# Patient Record
Sex: FEMALE | Race: WHITE | NOT HISPANIC OR LATINO | Employment: UNEMPLOYED | URBAN - METROPOLITAN AREA
[De-identification: names, ages, dates, MRNs, and addresses within clinical notes are randomized per-mention and may not be internally consistent; named-entity substitution may affect disease eponyms.]

---

## 2017-05-18 RX ORDER — INSULIN GLARGINE 100 [IU]/ML
INJECTION, SOLUTION SUBCUTANEOUS
Qty: 45 SYRINGE | Refills: 3 | Status: SHIPPED | OUTPATIENT
Start: 2017-05-18 | End: 2018-08-10 | Stop reason: SDUPTHER

## 2017-06-22 RX ORDER — ESCITALOPRAM OXALATE 20 MG/1
TABLET ORAL
Qty: 90 TABLET | Refills: 3 | Status: SHIPPED | OUTPATIENT
Start: 2017-06-22 | End: 2018-06-28 | Stop reason: SDUPTHER

## 2017-06-22 RX ORDER — BISOPROLOL FUMARATE 10 MG/1
TABLET, FILM COATED ORAL
Qty: 90 TABLET | Refills: 3 | Status: ON HOLD | OUTPATIENT
Start: 2017-06-22 | End: 2017-08-11 | Stop reason: HOSPADM

## 2017-07-07 DIAGNOSIS — Z86.010 HISTORY OF COLON POLYPS: ICD-10-CM

## 2017-07-07 DIAGNOSIS — Z80.0 FAMILY HISTORY OF COLON CANCER IN MOTHER: ICD-10-CM

## 2017-07-07 DIAGNOSIS — Z12.11 COLON CANCER SCREENING: Primary | ICD-10-CM

## 2017-07-07 RX ORDER — POLYETHYLENE GLYCOL 3350, SODIUM SULFATE ANHYDROUS, SODIUM BICARBONATE, SODIUM CHLORIDE, POTASSIUM CHLORIDE 236; 22.74; 6.74; 5.86; 2.97 G/4L; G/4L; G/4L; G/4L; G/4L
4 POWDER, FOR SOLUTION ORAL SEE ADMIN INSTRUCTIONS
Qty: 4000 ML | Refills: 0 | Status: SHIPPED | OUTPATIENT
Start: 2017-07-07 | End: 2018-05-17

## 2017-07-07 NOTE — TELEPHONE ENCOUNTER
----- Message from Bee Christopher sent at 7/7/2017  1:38 PM CDT -----  Contact: [phone]/ self   Patient would like to schedule a colonoscopy. Please advise.

## 2017-07-07 NOTE — TELEPHONE ENCOUNTER
Colonoscopy Referral  Referring Physician: Dr. Ramirez  Date: 2/16/17  Reason for Referral: Screening  Family History of:   Colon polyp: No  Relationship/Age of Onset:   Colon cancer: Yes  Relationship/Age of Onset: Mother, 74   Patient with:   Hemoccults Done: No  Iron deficient: No  On Blood Thinner: No  Valvular heart disease/valve replacement: No  Anemia Present: No  On NSAID: No  Lung disease: No  Kidney disease: No  Hx of polyps: Yes  Hx of colon cancer: No  Previous colon evalations: Yes   Colonoscopy  When: 12/21/11  Where: [facility]   Pertinent symptoms:   Current Outpatient Prescriptions:  loperamide (IMODIUM) 1 mg/5 mL solution, Take by mouth every 6 (six) hours as needed for Diarrhea., Disp: , Rfl:   No current facility-administered medications for this visit.   ?  Review of patient's allergies indicates:  No Known Allergies  Patient was scheduled for colonoscopy on 8/1/17 with Dr. Ramirez at [facility]. Golytely instructions were reviewed with patient.

## 2017-07-17 ENCOUNTER — HOSPITAL ENCOUNTER (OUTPATIENT)
Dept: RADIOLOGY | Facility: HOSPITAL | Age: 62
Discharge: HOME | End: 2017-07-17
Attending: INTERNAL MEDICINE
Payer: OTHER GOVERNMENT

## 2017-07-17 ENCOUNTER — OFFICE VISIT (OUTPATIENT)
Dept: HEMATOLOGY/ONCOLOGY | Facility: CLINIC | Age: 62
End: 2017-07-17
Payer: OTHER GOVERNMENT

## 2017-07-17 VITALS
HEART RATE: 72 BPM | SYSTOLIC BLOOD PRESSURE: 139 MMHG | DIASTOLIC BLOOD PRESSURE: 63 MMHG | TEMPERATURE: 100 F | BODY MASS INDEX: 34.99 KG/M2 | WEIGHT: 185.19 LBS | RESPIRATION RATE: 16 BRPM

## 2017-07-17 VITALS — WEIGHT: 180 LBS | BODY MASS INDEX: 33.99 KG/M2 | HEIGHT: 61 IN

## 2017-07-17 DIAGNOSIS — D05.12 DUCTAL CARCINOMA IN SITU (DCIS) OF LEFT BREAST: Primary | ICD-10-CM

## 2017-07-17 DIAGNOSIS — D05.12 DUCTAL CARCINOMA IN SITU (DCIS) OF LEFT BREAST: ICD-10-CM

## 2017-07-17 PROCEDURE — 99999 PR PBB SHADOW E&M-EST. PATIENT-LVL III: CPT | Mod: PBBFAC,,, | Performed by: INTERNAL MEDICINE

## 2017-07-17 PROCEDURE — 77062 BREAST TOMOSYNTHESIS BI: CPT | Mod: 26,,, | Performed by: RADIOLOGY

## 2017-07-17 PROCEDURE — 77066 DX MAMMO INCL CAD BI: CPT | Mod: 26,,, | Performed by: RADIOLOGY

## 2017-07-17 PROCEDURE — 99213 OFFICE O/P EST LOW 20 MIN: CPT | Mod: S$GLB,,, | Performed by: INTERNAL MEDICINE

## 2017-07-17 NOTE — PROGRESS NOTES
Subjective:       Patient ID: Jeanine Chandler is a 62 y.o. female.    Chief Complaint: Follow-up    HPI Ms. Chandler is a 62-year-old white female who   returned to clinic for followup of ductal carcinoma in situ of the left      breast.  She had lumpectomy in 05/2005 and was treated on protocol NSABP     B-35 (Arimidex versus tamoxifen).  She completed that therapy in 09/2010.    Today she reports no new issues.  She has had some emotional stress related to the death of her mother at the beginning of the year.  She's building a house in [place] is looking forward to that.  She's also expecting to have a new grandchild in August.    Review of Systems   Constitutional: Negative for activity change, appetite change and unexpected weight change.   Respiratory: Negative for cough and shortness of breath.    Cardiovascular: Negative for chest pain.   Gastrointestinal: Negative for abdominal pain, nausea and vomiting.   Musculoskeletal: Negative for back pain and neck pain.   Neurological: Negative for headaches.   Psychiatric/Behavioral: Negative for dysphoric mood. The patient is not nervous/anxious.        Objective:      Physical Exam   Constitutional: She appears well-developed and well-nourished.   Cardiovascular: Normal rate, regular rhythm and normal heart sounds.    Pulmonary/Chest: Effort normal and breath sounds normal. She has no wheezes. She has no rales. Right breast exhibits no mass, no nipple discharge and no skin change. Left breast exhibits no mass, no nipple discharge and no skin change.       Abdominal: Soft. She exhibits no mass. There is no tenderness.   Psychiatric: She has a normal mood and affect. Her behavior is normal. Thought content normal.       Assessment:     mammograms  are unremarkable  1. Ductal carcinoma in situ (DCIS) of left breast        Plan:       Return in one year for follow-up mammograms.

## 2017-07-18 ENCOUNTER — PATIENT MESSAGE (OUTPATIENT)
Dept: GASTROENTEROLOGY | Facility: CLINIC | Age: 62
End: 2017-07-18

## 2017-07-18 ENCOUNTER — TELEPHONE (OUTPATIENT)
Dept: GASTROENTEROLOGY | Facility: CLINIC | Age: 62
End: 2017-07-18

## 2017-07-19 ENCOUNTER — TELEPHONE (OUTPATIENT)
Dept: GASTROENTEROLOGY | Facility: CLINIC | Age: 62
End: 2017-07-19

## 2017-07-19 NOTE — TELEPHONE ENCOUNTER
Spoke with pt and was able to reschedule her procedure to 8/11/17 with Dr. Ramirez. Verbal Understanding. Pt stated that she already has her prep and instructions.           ----- Message from Amy Benavides sent at 7/19/2017  1:48 PM CDT -----  No. [phone]   Patient is calling to reschedule her procedure.   Please call.

## 2017-08-11 ENCOUNTER — HOSPITAL ENCOUNTER (OUTPATIENT)
Facility: HOSPITAL | Age: 62
Discharge: HOME | End: 2017-08-11
Attending: INTERNAL MEDICINE | Admitting: INTERNAL MEDICINE
Payer: OTHER GOVERNMENT

## 2017-08-11 ENCOUNTER — ANESTHESIA (OUTPATIENT)
Dept: ENDOSCOPY | Facility: HOSPITAL | Age: 62
End: 2017-08-11
Payer: OTHER GOVERNMENT

## 2017-08-11 ENCOUNTER — ANESTHESIA EVENT (OUTPATIENT)
Dept: ENDOSCOPY | Facility: HOSPITAL | Age: 62
End: 2017-08-11
Payer: OTHER GOVERNMENT

## 2017-08-11 VITALS
HEART RATE: 58 BPM | TEMPERATURE: 98 F | DIASTOLIC BLOOD PRESSURE: 71 MMHG | WEIGHT: 185 LBS | HEIGHT: 61 IN | RESPIRATION RATE: 15 BRPM | OXYGEN SATURATION: 100 % | BODY MASS INDEX: 34.93 KG/M2 | SYSTOLIC BLOOD PRESSURE: 121 MMHG

## 2017-08-11 DIAGNOSIS — I48.91 A-FIB: ICD-10-CM

## 2017-08-11 DIAGNOSIS — Z86.010 HISTORY OF COLON POLYPS: ICD-10-CM

## 2017-08-11 DIAGNOSIS — I49.9 IRREGULAR HEART RATE: ICD-10-CM

## 2017-08-11 PROBLEM — Z86.0100 HISTORY OF COLON POLYPS: Status: ACTIVE | Noted: 2017-08-11

## 2017-08-11 LAB
ANION GAP SERPL CALC-SCNC: 11 MMOL/L
BASOPHILS # BLD AUTO: 0.03 K/UL
BASOPHILS NFR BLD: 0.3 %
BNP SERPL-MCNC: 134 PG/ML
BUN SERPL-MCNC: 10 MG/DL
CALCIUM SERPL-MCNC: 9.2 MG/DL
CHLORIDE SERPL-SCNC: 105 MMOL/L
CK SERPL-CCNC: 35 U/L
CO2 SERPL-SCNC: 26 MMOL/L
CREAT SERPL-MCNC: 1 MG/DL
DIFFERENTIAL METHOD: ABNORMAL
EOSINOPHIL # BLD AUTO: 0.2 K/UL
EOSINOPHIL NFR BLD: 1.5 %
ERYTHROCYTE [DISTWIDTH] IN BLOOD BY AUTOMATED COUNT: 14.5 %
EST. GFR  (AFRICAN AMERICAN): >60 ML/MIN/1.73 M^2
EST. GFR  (NON AFRICAN AMERICAN): 60 ML/MIN/1.73 M^2
GLUCOSE SERPL-MCNC: 164 MG/DL
HCT VFR BLD AUTO: 39.6 %
HGB BLD-MCNC: 13.1 G/DL
LYMPHOCYTES # BLD AUTO: 3.6 K/UL
LYMPHOCYTES NFR BLD: 37.3 %
MCH RBC QN AUTO: 26.4 PG
MCHC RBC AUTO-ENTMCNC: 33.1 G/DL
MCV RBC AUTO: 80 FL
MONOCYTES # BLD AUTO: 0.6 K/UL
MONOCYTES NFR BLD: 6.2 %
NEUTROPHILS # BLD AUTO: 5.3 K/UL
NEUTROPHILS NFR BLD: 54.3 %
PLATELET # BLD AUTO: 246 K/UL
PMV BLD AUTO: 10.6 FL
POCT GLUCOSE: 263 MG/DL (ref 70–110)
POTASSIUM SERPL-SCNC: 4.1 MMOL/L
RBC # BLD AUTO: 4.97 M/UL
SODIUM SERPL-SCNC: 142 MMOL/L
TROPONIN I SERPL DL<=0.01 NG/ML-MCNC: 0.04 NG/ML
TSH SERPL DL<=0.005 MIU/L-ACNC: 2.24 UIU/ML
WBC # BLD AUTO: 9.74 K/UL

## 2017-08-11 PROCEDURE — 25000003 PHARM REV CODE 250: Performed by: NURSE ANESTHETIST, CERTIFIED REGISTERED

## 2017-08-11 PROCEDURE — 84443 ASSAY THYROID STIM HORMONE: CPT

## 2017-08-11 PROCEDURE — 93005 ELECTROCARDIOGRAM TRACING: CPT

## 2017-08-11 PROCEDURE — 88305 TISSUE EXAM BY PATHOLOGIST: CPT | Mod: 26,,, | Performed by: PATHOLOGY

## 2017-08-11 PROCEDURE — 80048 BASIC METABOLIC PNL TOTAL CA: CPT

## 2017-08-11 PROCEDURE — 27201028 HC NEEDLE, SCLERO: Performed by: INTERNAL MEDICINE

## 2017-08-11 PROCEDURE — 45385 COLONOSCOPY W/LESION REMOVAL: CPT | Mod: 33,,, | Performed by: INTERNAL MEDICINE

## 2017-08-11 PROCEDURE — 27201089 HC SNARE, DISP (ANY): Performed by: INTERNAL MEDICINE

## 2017-08-11 PROCEDURE — 25000003 PHARM REV CODE 250: Performed by: INTERNAL MEDICINE

## 2017-08-11 PROCEDURE — 88305 TISSUE EXAM BY PATHOLOGIST: CPT | Performed by: PATHOLOGY

## 2017-08-11 PROCEDURE — 63600175 PHARM REV CODE 636 W HCPCS: Performed by: NURSE ANESTHETIST, CERTIFIED REGISTERED

## 2017-08-11 PROCEDURE — 99245 OFF/OP CONSLTJ NEW/EST HI 55: CPT | Mod: ,,, | Performed by: INTERNAL MEDICINE

## 2017-08-11 PROCEDURE — 83880 ASSAY OF NATRIURETIC PEPTIDE: CPT

## 2017-08-11 PROCEDURE — 36415 COLL VENOUS BLD VENIPUNCTURE: CPT

## 2017-08-11 PROCEDURE — 85025 COMPLETE CBC W/AUTO DIFF WBC: CPT

## 2017-08-11 PROCEDURE — 37000008 HC ANESTHESIA 1ST 15 MINUTES: Performed by: INTERNAL MEDICINE

## 2017-08-11 PROCEDURE — 45381 COLONOSCOPY SUBMUCOUS NJX: CPT | Mod: 51,,, | Performed by: INTERNAL MEDICINE

## 2017-08-11 PROCEDURE — 82550 ASSAY OF CK (CPK): CPT

## 2017-08-11 PROCEDURE — 45385 COLONOSCOPY W/LESION REMOVAL: CPT | Performed by: INTERNAL MEDICINE

## 2017-08-11 PROCEDURE — 45381 COLONOSCOPY SUBMUCOUS NJX: CPT | Performed by: INTERNAL MEDICINE

## 2017-08-11 PROCEDURE — 37000009 HC ANESTHESIA EA ADD 15 MINS: Performed by: INTERNAL MEDICINE

## 2017-08-11 PROCEDURE — 84484 ASSAY OF TROPONIN QUANT: CPT

## 2017-08-11 RX ORDER — METOPROLOL TARTRATE 1 MG/ML
INJECTION, SOLUTION INTRAVENOUS
Status: DISCONTINUED | OUTPATIENT
Start: 2017-08-11 | End: 2017-08-11

## 2017-08-11 RX ORDER — METOPROLOL SUCCINATE 50 MG/1
50 TABLET, EXTENDED RELEASE ORAL DAILY
Qty: 30 TABLET | Refills: 11 | Status: SHIPPED | OUTPATIENT
Start: 2017-08-11 | End: 2017-08-25 | Stop reason: SDUPTHER

## 2017-08-11 RX ORDER — PROPOFOL 10 MG/ML
VIAL (ML) INTRAVENOUS CONTINUOUS PRN
Status: DISCONTINUED | OUTPATIENT
Start: 2017-08-11 | End: 2017-08-11

## 2017-08-11 RX ORDER — SODIUM CHLORIDE 9 MG/ML
INJECTION, SOLUTION INTRAVENOUS CONTINUOUS
Status: DISCONTINUED | OUTPATIENT
Start: 2017-08-11 | End: 2017-08-11 | Stop reason: HOSPADM

## 2017-08-11 RX ORDER — LIDOCAINE HCL/PF 100 MG/5ML
SYRINGE (ML) INTRAVENOUS
Status: DISCONTINUED | OUTPATIENT
Start: 2017-08-11 | End: 2017-08-11

## 2017-08-11 RX ORDER — PROPOFOL 10 MG/ML
VIAL (ML) INTRAVENOUS
Status: DISCONTINUED | OUTPATIENT
Start: 2017-08-11 | End: 2017-08-11

## 2017-08-11 RX ADMIN — LIDOCAINE HYDROCHLORIDE 50 MG: 20 INJECTION, SOLUTION INTRAVENOUS at 12:08

## 2017-08-11 RX ADMIN — PROPOFOL 50 MG: 10 INJECTION, EMULSION INTRAVENOUS at 12:08

## 2017-08-11 RX ADMIN — PROPOFOL 150 MCG/KG/MIN: 10 INJECTION, EMULSION INTRAVENOUS at 12:08

## 2017-08-11 RX ADMIN — METOPROLOL TARTRATE 2 MG: 1 INJECTION, SOLUTION INTRAVENOUS at 11:08

## 2017-08-11 RX ADMIN — SODIUM CHLORIDE: 0.9 INJECTION, SOLUTION INTRAVENOUS at 09:08

## 2017-08-11 NOTE — CONSULTS
[facility]  Cardiology  Consult Note    Patient Name: Jeanine Chandler  MRN: [id number]  Admission Date: 2017  Hospital Length of Stay: 0 days  Code Status: No Order   Attending Provider: Laura Ramirez MD   Consulting Provider: EMMANUEL Lazo, ANP  Primary Care Physician: Chi Hanna MD  Principal Problem: elective colonoscopy; cards consult for afib  Patient information was obtained from patient and past medical records.     Inpatient consult to [facility]  Consult performed by: ELPIDIO CHEN  Consult ordered by: MIKIE PRICE  Reason for consult: atrial fibrillation         Subjective:     Chief Complaint:  Elective colonoscopy for evaluation of colon polyps    Cardiology Consult: new onset afib      HPI:   63yo female with history of HTN, HLP, DMII, DJD and breast cancer who presents for elective colonoscopy. During anesthesia evaluation, her HR was noted to be elevated in the 110s. EKG performed with evidence of afib. Patient without any complaints of chest pain, SOB or palpitations. Cardiology consulted for further evaluation. Ms. Chandler denies any history of afib and denies any exertional chest pain or SOB. She denies any hematuria, hematemesis or melena. She has no cardiac issues and is followed by PCP and was seen by Cardiology in [place] at one time for ?reasons     Past Medical History:   Diagnosis Date    Asthma     DCIS (ductal carcinoma in situ)     left breast; s/p lumpectomy and treated on protocol NSABP       Diabetes mellitus     DJD (degenerative joint disease) of cervical spine     Dysphonia     GERD (gastroesophageal reflux disease)     Hyperlipidemia     Hypertension     Vitamin D deficiency disease        Past Surgical History:   Procedure Laterality Date     elbow      right    BREAST SURGERY       SECTION  1979    HERNIA REPAIR      left inguinal    HYSTERECTOMY      JOINT REPLACEMENT      left knee     "laparscopic      exploratory    myomectomy  1982       Review of patient's allergies indicates:   Allergen Reactions    Cephalosporins      Other reaction(s): Unknown    Latex      Other reaction(s): Rash       No current facility-administered medications on file prior to encounter.      Current Outpatient Prescriptions on File Prior to Encounter   Medication Sig    bisoprolol (ZEBETA) 10 MG tablet TAKE 1 TABLET EVERY DAY    ergocalciferol (ERGOCALCIFEROL) 50,000 unit capsule Take by mouth. 1 Capsule Oral Weekly    escitalopram oxalate (LEXAPRO) 20 MG tablet TAKE 1 TABLET EVERY DAY    FLOVENT  mcg/actuation inhaler INHALE 1 PUFF EVERY 12 HOURS    JANUVIA 100 mg Tab TAKE 1 TABLET BY MOUTH EVERY DAY    lansoprazole (PREVACID) 30 MG capsule TAKE 1 CAPSULE (30 MG TOTAL) BY MOUTH 2 (TWO) TIMES DAILY.    LANTUS SOLOSTAR 100 unit/mL (3 mL) InPn pen INJECT 40 UNITS INTO THE SKIN ONCE DAILY.    lisinopril-hydrochlorothiazide (PRINZIDE,ZESTORETIC) 20-12.5 mg per tablet TAKE 1 TABLET EVERY DAY FOR HYPERTENSION    metformin (GLUCOPHAGE) 1000 MG tablet TAKE 1 TABLET TWICE A DAY WITH MEALS    ranitidine (ZANTAC) 300 MG tablet TAKE 1 TABLET AT BEDTIME    albuterol (PROAIR HFA) 90 mcg/actuation HFAA Inhale 2 puffs into the lungs every 4 (four) hours as needed. 2 HFA Aerosol Inhaler Inhalation Every 4-6 hours    BD INSULIN PEN NEEDLE UF SHORT 31 gauge x 5/16" Ndle USE WITH INSULIN EVERY EVENING AS DIRECTED    blood sugar diagnostic (CONTOUR TEST STRIPS) Strp Test 4 x daily    blood-glucose meter (CONTOUR METER) Misc Test 4x daily    epinephrine (EPIPEN) 0.3 mg/0.3 mL (1:1,000) AtIn Inject 0.3 mLs (0.3 mg total) into the muscle once. Pen Injector Intramuscular .  As directed for anaphylaxis PRN    fenofibrate (TRICOR) 145 MG tablet TAKE 1 TABLET (145 MG TOTAL) BY MOUTH ONCE DAILY.    FLUARIX QUAD 8697-1071, PF, 60 mcg (15 mcg x 4)/0.5 mL Syrg     lancets (ONE TOUCH ULTRASOFT LANCETS) Misc 1 Units by " Misc.(Non-Drug; Combo Route) route 3 (three) times daily.    nystatin (MYCOSTATIN) cream Apply topically 2 (two) times daily.    polyethylene glycol (GOLYTELY,NULYTELY) 236-22.74-6.74 -5.86 gram suspension Take 4,000 mLs (4 L total) by mouth As instructed.    simvastatin (ZOCOR) 10 MG tablet Take 1 tablet (10 mg total) by mouth once daily.    sitagliptin (JANUVIA) 100 MG Tab Take 1 tablet (100 mg total) by mouth once daily.     Family History     Problem Relation (Age of Onset)    Cancer Mother    Diabetes Mother    Heart disease Mother, Maternal Uncle    Stroke Sister        Social History Main Topics    Smoking status: Never Smoker    Smokeless tobacco: Never Used    Alcohol use No    Drug use: No    Sexual activity: Not on file     Review of Systems   Constitution: Negative for chills, decreased appetite, diaphoresis, fever and weakness.   Cardiovascular: Negative for chest pain, claudication, cyanosis, dyspnea on exertion, irregular heartbeat, leg swelling, near-syncope, orthopnea, palpitations, paroxysmal nocturnal dyspnea and syncope.   Respiratory: Negative for cough, hemoptysis, shortness of breath and wheezing.    Gastrointestinal: Negative for bloating, abdominal pain, constipation, diarrhea, melena, nausea and vomiting.   Neurological: Negative for dizziness.     Objective:     Vital Signs (Most Recent):  Temp: 98.9 °F (37.2 °C) (08/11/17 1157)  Pulse: 88 (08/11/17 1157)  Resp: 18 (08/11/17 1157)  BP: 138/70 (08/11/17 1157)  SpO2: 99 % (08/11/17 1157) Vital Signs (24h Range):  Temp:  [98.9 °F (37.2 °C)] 98.9 °F (37.2 °C)  Pulse:  [88] 88  Resp:  [18] 18  SpO2:  [99 %] 99 %  BP: (138)/(70) 138/70     Weight: 83.9 kg (185 lb)  Body mass index is 34.96 kg/m².    SpO2: 99 %  O2 Device (Oxygen Therapy): room air    No intake or output data in the 24 hours ending 08/11/17 1222    Lines/Drains/Airways     Airway                 Airway - Non-Surgical 08/11/17 1154 Nasal Cannula less than 1 day           Peripheral Intravenous Line                 Peripheral IV - Single Lumen 08/11/17 0942 Right Antecubital less than 1 day                Physical Exam   Constitutional: She is oriented to person, place, and time. She appears well-developed and well-nourished. No distress.   Cardiovascular: Normal rate.  An irregularly irregular rhythm present. Exam reveals no gallop.    No murmur heard.  Pulmonary/Chest: Effort normal and breath sounds normal. No respiratory distress. She has no wheezes.   Abdominal: Soft. Bowel sounds are normal. She exhibits no distension. There is no tenderness.   Neurological: She is alert and oriented to person, place, and time.   Skin: Skin is warm and dry.       Significant Labs: BMP, CBC, TSH, BNP, CPK and troponin in process    EKG: atrial fibrillation with ; normal axis; no STTWC       Assessment and Plan:     Atrial fibrillation    -new onset afib  -HR 110s asymptomatic; no concern for ischemic etiology currently  -labs pending  -on Bisoprolol at home and recommend change to Toprol XL 50mg po daily  -CHADSVAS score 4 with indication for chronic AC for stroke prevention but will hold off given need for colonoscopy and possible polypectomy  -will rate control for now; may proceed with colonoscopy; if RVR occurs recommend use of IV Metoprolol, Cardizem or Esmolol  -will arrange for follow up as an outpatient to discuss AHSAN/DCCV         History of colon polyps    -history of colon polyps  -routine repeat colonoscopy today             VTE Risk Mitigation     None          Thank you for your consult. I will sign off. Please contact us if you have any additional questions.    EMMANUEL Lazo, ANP  Cardiology   [facility]

## 2017-08-11 NOTE — ANESTHESIA PREPROCEDURE EVALUATION
08/11/2017  Jeanine Chandler is a 62 y.o., female for colonoscopy under MAC. New onset afib rate 110's, seen by cardiology who recommends lopressor and proceed. They have arranged f/u with cardiology clinic.     Anesthesia Evaluation     I have reviewed the Nursing Notes.   I have reviewed the Medications.     Review of Systems  Anesthesia Hx:   Denies Personal Hx of Anesthesia complications.   Social:  Non-Smoker, No Alcohol Use   Cardiovascular:   Exercise tolerance: good Hypertension hyperlipidemia    Pulmonary:   Asthma    Hepatic/GI:   GERD    Musculoskeletal:   Arthritis   Spine Disorders: Disc disease and Degenerative disease    Endocrine:   Diabetes        Physical Exam  General:  Obesity       Chest/Lungs:  Chest/Lungs Clear    Heart/Vascular:  Heart Findings: Rate: Tachycardia  Rhythm: Irregularly Irregular             Anesthesia Plan  Type of Anesthesia, risks & benefits discussed:  Anesthesia Type:  MAC  Patient's Preference:   Intra-op Monitoring Plan:   Intra-op Monitoring Plan Comments:   Post Op Pain Control Plan:   Post Op Pain Control Plan Comments:   Induction:    Beta Blocker:  Patient is not currently on a Beta-Blocker (No further documentation required).       Informed Consent: Patient understands risks and agrees with Anesthesia plan.  Questions answered. Anesthesia consent signed with patient.  ASA Score: 2     Day of Surgery Review of History & Physical:            Ready For Surgery From Anesthesia Perspective.

## 2017-08-11 NOTE — ASSESSMENT & PLAN NOTE
-new onset afib  -HR 110s asymptomatic; no concern for ischemic etiology currently  -labs pending  -on Bisoprolol at home and recommend change to Toprol XL 50mg po daily  -CHADSVAS score 4 with indication for chronic AC for stroke prevention but will hold off given need for colonoscopy and possible polypectomy  -will rate control for now; may proceed with colonoscopy; if RVR occurs recommend use of IV Metoprolol, Cardizem or Esmolol  -will arrange for follow up as an outpatient to discuss AHSAN/DCCV

## 2017-08-11 NOTE — H&P
History and Physical      Chief complaints: Requesting colonscopy    History of Presenting Illness    Patient requesting colonoscopy.  Patient denies any abdominal pain, weight loss or blood in the stool.  There is a family history of colon cancer.  On presentation today, she was in new onset Afib.    Review of Systems   Constitutional: Negative for fever and appetite change.   HENT: Negative for sore throat, trouble swallowing and neck pain.   Eyes: Negative for photophobia and visual disturbance.   Respiratory: Negative for wheezing.   Cardiovascular: Negative for chest pain and palpitations.   Gastrointestinal: See HPI for details   Musculoskeletal: Negative for joint swelling and arthralgias.   Skin: Negative for rash and wound.   Neurological: Negative for dizziness, tremors and weakness.   Hematological: Negative.   Psychiatric/Behavioral: Negative for suicidal ideas and behavioral problems.     Past Medical History:   Diagnosis Date    Asthma     DCIS (ductal carcinoma in situ)     left breast; s/p lumpectomy and treated on protocol NSABP       Diabetes mellitus     DJD (degenerative joint disease) of cervical spine     Dysphonia     GERD (gastroesophageal reflux disease)     Hyperlipidemia     Hypertension     Vitamin D deficiency disease        Past Surgical History:   Procedure Laterality Date     elbow      right    BREAST SURGERY       SECTION      1    HERNIA REPAIR      left inguinal    HYSTERECTOMY      JOINT REPLACEMENT      left knee    laparscopic      exploratory    myomectomy  1982       Family History   Problem Relation Age of Onset    Diabetes Mother     Heart disease Mother     Cancer Mother     Stroke Sister     Heart disease Maternal Uncle        Social History     Social History    Marital status:      Spouse name: N/A    Number of children: N/A    Years of education: N/A     Social History Main Topics    Smoking status: Never Smoker     Smokeless tobacco: Never Used    Alcohol use No    Drug use: No    Sexual activity: Not Asked     Other Topics Concern    None     Social History Narrative    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 Appears well-nourished.   HENT:   Mouth/Throat: Oropharynx is clear and moist.   Eyes: Pupils are equal, round, and reactive to light.   Neck: Neck supple.   Cardiovascular: Normal heart sounds.   Pulmonary/Chest: Effort normal and breath sounds normal.   Abdominal: Soft. Exhibits no mass. There is no tenderness. There is no guarding.   Musculoskeletal: Normal range of motion.   Lymphadenopathy: Has no cervical adenopathy.   Neurological:Alert and oriented to person, place, and time.   Skin: Skin is warm. No rash noted.   Psychiatric: Has a normal mood and affect.     Assessment:  Family history of colon cancer     Plan:  Colonoscopy if cleared by Cardiologist.       I have reviewed the patient's medical history in detail and updated the computerized patient record

## 2017-08-11 NOTE — ANESTHESIA POSTPROCEDURE EVALUATION
"Anesthesia Post Evaluation    Patient: Jeanine Chandler    Procedure(s) Performed: Procedure(s) (LRB):  COLONOSCOPY Golytely (N/A)    Final Anesthesia Type: MAC  Patient location during evaluation: GI PACU  Patient participation: Yes- Able to Participate  Level of consciousness: awake and alert and oriented  Post-procedure vital signs: reviewed and stable  Pain management: adequate  Airway patency: patent  PONV status at discharge: No PONV  Anesthetic complications: no      Cardiovascular status: blood pressure returned to baseline and hemodynamically stable  Respiratory status: unassisted, spontaneous ventilation and room air  Hydration status: euvolemic  Follow-up not needed.        Visit Vitals  /70 (BP Location: Right arm, Patient Position: Lying)   Pulse 88   Temp 37.2 °C (98.9 °F) (Oral)   Resp 18   Ht 5' 1" (1.549 m)   Wt 83.9 kg (185 lb)   SpO2 99%   Breastfeeding? No   BMI 34.96 kg/m²       Pain/Brandyn Score: Pain Assessment Performed: Yes (8/11/2017  9:41 AM)  Presence of Pain: denies (8/11/2017  9:41 AM)      "

## 2017-08-11 NOTE — HPI
61yo female with history of HTN, HLP, DMII, DJD and breast cancer who presents for elective colonoscopy. During anesthesia evaluation, her HR was noted to be elevated in the 110s. EKG performed with evidence of afib. Patient without any complaints of chest pain, SOB or palpitations. Cardiology consulted for further evaluation. Ms. Chandler denies any history of afib and denies any exertional chest pain or SOB. She denies any hematuria, hematemesis or melena. She has no cardiac issues and is followed by PCP and was seen by Cardiology in [place] at one time for ?reasons

## 2017-08-11 NOTE — TRANSFER OF CARE
"Anesthesia Transfer of Care Note    Patient: Jeanine Chandler    Procedure(s) Performed: Procedure(s) (LRB):  COLONOSCOPY Golytely (N/A)    Patient location: GI    Anesthesia Type: MAC    Transport from OR: Transported from OR on room air with adequate spontaneous ventilation    Post pain: adequate analgesia    Post assessment: no apparent anesthetic complications    Post vital signs: stable    Level of consciousness: awake    Nausea/Vomiting: no nausea/vomiting    Complications: none    Transfer of care protocol was followed      Last vitals:   Visit Vitals  /70 (BP Location: Right arm, Patient Position: Lying)   Pulse 88   Temp 37.2 °C (98.9 °F) (Oral)   Resp 18   Ht 5' 1" (1.549 m)   Wt 83.9 kg (185 lb)   SpO2 99%   Breastfeeding? No   BMI 34.96 kg/m²     "

## 2017-08-11 NOTE — SUBJECTIVE & OBJECTIVE
Past Medical History:   Diagnosis Date    Asthma     DCIS (ductal carcinoma in situ)     left breast; s/p lumpectomy and treated on protocol NSABP       Diabetes mellitus     DJD (degenerative joint disease) of cervical spine     Dysphonia     GERD (gastroesophageal reflux disease)     Hyperlipidemia     Hypertension     Vitamin D deficiency disease        Past Surgical History:   Procedure Laterality Date     elbow      right    BREAST SURGERY       SECTION      1    HERNIA REPAIR      left inguinal    HYSTERECTOMY      JOINT REPLACEMENT      left knee    laparscopic      exploratory    myomectomy         Review of patient's allergies indicates:   Allergen Reactions    Cephalosporins      Other reaction(s): Unknown    Latex      Other reaction(s): Rash       No current facility-administered medications on file prior to encounter.      Current Outpatient Prescriptions on File Prior to Encounter   Medication Sig    bisoprolol (ZEBETA) 10 MG tablet TAKE 1 TABLET EVERY DAY    ergocalciferol (ERGOCALCIFEROL) 50,000 unit capsule Take by mouth. 1 Capsule Oral Weekly    escitalopram oxalate (LEXAPRO) 20 MG tablet TAKE 1 TABLET EVERY DAY    FLOVENT  mcg/actuation inhaler INHALE 1 PUFF EVERY 12 HOURS    JANUVIA 100 mg Tab TAKE 1 TABLET BY MOUTH EVERY DAY    lansoprazole (PREVACID) 30 MG capsule TAKE 1 CAPSULE (30 MG TOTAL) BY MOUTH 2 (TWO) TIMES DAILY.    LANTUS SOLOSTAR 100 unit/mL (3 mL) InPn pen INJECT 40 UNITS INTO THE SKIN ONCE DAILY.    lisinopril-hydrochlorothiazide (PRINZIDE,ZESTORETIC) 20-12.5 mg per tablet TAKE 1 TABLET EVERY DAY FOR HYPERTENSION    metformin (GLUCOPHAGE) 1000 MG tablet TAKE 1 TABLET TWICE A DAY WITH MEALS    ranitidine (ZANTAC) 300 MG tablet TAKE 1 TABLET AT BEDTIME    albuterol (PROAIR HFA) 90 mcg/actuation HFAA Inhale 2 puffs into the lungs every 4 (four) hours as needed. 2 HFA Aerosol Inhaler Inhalation Every 4-6 hours    BD INSULIN PEN  "NEEDLE UF SHORT 31 gauge x 5/16" Ndle USE WITH INSULIN EVERY EVENING AS DIRECTED    blood sugar diagnostic (CONTOUR TEST STRIPS) Strp Test 4 x daily    blood-glucose meter (CONTOUR METER) Misc Test 4x daily    epinephrine (EPIPEN) 0.3 mg/0.3 mL (1:1,000) AtIn Inject 0.3 mLs (0.3 mg total) into the muscle once. Pen Injector Intramuscular .  As directed for anaphylaxis PRN    fenofibrate (TRICOR) 145 MG tablet TAKE 1 TABLET (145 MG TOTAL) BY MOUTH ONCE DAILY.    FLUARIX QUAD 4022-0484, PF, 60 mcg (15 mcg x 4)/0.5 mL Syrg     lancets (ONE TOUCH ULTRASOFT LANCETS) Misc 1 Units by Misc.(Non-Drug; Combo Route) route 3 (three) times daily.    nystatin (MYCOSTATIN) cream Apply topically 2 (two) times daily.    polyethylene glycol (GOLYTELY,NULYTELY) 236-22.74-6.74 -5.86 gram suspension Take 4,000 mLs (4 L total) by mouth As instructed.    simvastatin (ZOCOR) 10 MG tablet Take 1 tablet (10 mg total) by mouth once daily.    sitagliptin (JANUVIA) 100 MG Tab Take 1 tablet (100 mg total) by mouth once daily.     Family History     Problem Relation (Age of Onset)    Cancer Mother    Diabetes Mother    Heart disease Mother, Maternal Uncle    Stroke Sister        Social History Main Topics    Smoking status: Never Smoker    Smokeless tobacco: Never Used    Alcohol use No    Drug use: No    Sexual activity: Not on file     Review of Systems   Constitution: Negative for chills, decreased appetite, diaphoresis, fever and weakness.   Cardiovascular: Negative for chest pain, claudication, cyanosis, dyspnea on exertion, irregular heartbeat, leg swelling, near-syncope, orthopnea, palpitations, paroxysmal nocturnal dyspnea and syncope.   Respiratory: Negative for cough, hemoptysis, shortness of breath and wheezing.    Gastrointestinal: Negative for bloating, abdominal pain, constipation, diarrhea, melena, nausea and vomiting.   Neurological: Negative for dizziness.     Objective:     Vital Signs (Most Recent):  Temp: 98.9 °F " (37.2 °C) (08/11/17 1157)  Pulse: 88 (08/11/17 1157)  Resp: 18 (08/11/17 1157)  BP: 138/70 (08/11/17 1157)  SpO2: 99 % (08/11/17 1157) Vital Signs (24h Range):  Temp:  [98.9 °F (37.2 °C)] 98.9 °F (37.2 °C)  Pulse:  [88] 88  Resp:  [18] 18  SpO2:  [99 %] 99 %  BP: (138)/(70) 138/70     Weight: 83.9 kg (185 lb)  Body mass index is 34.96 kg/m².    SpO2: 99 %  O2 Device (Oxygen Therapy): room air    No intake or output data in the 24 hours ending 08/11/17 1222    Lines/Drains/Airways     Airway                 Airway - Non-Surgical 08/11/17 1154 Nasal Cannula less than 1 day          Peripheral Intravenous Line                 Peripheral IV - Single Lumen 08/11/17 0942 Right Antecubital less than 1 day                Physical Exam   Constitutional: She is oriented to person, place, and time. She appears well-developed and well-nourished. No distress.   Cardiovascular: Normal rate.  An irregularly irregular rhythm present. Exam reveals no gallop.    No murmur heard.  Pulmonary/Chest: Effort normal and breath sounds normal. No respiratory distress. She has no wheezes.   Abdominal: Soft. Bowel sounds are normal. She exhibits no distension. There is no tenderness.   Neurological: She is alert and oriented to person, place, and time.   Skin: Skin is warm and dry.       Significant Labs: BMP, CBC, TSH, BNP, CPK and troponin in process    EKG: atrial fibrillation with ; normal axis; no STTWC

## 2017-08-11 NOTE — DISCHARGE INSTRUCTIONS
Discharge Summary/Instructions for after Colonoscopy with Biopsy/Polypectomy    Jeanine Chandler  8/11/2017  Laura Ramirez MD    Restrictions on Activity:    - Do not drive car or operate machinery until the day after the procedure.  - The following day: return to full activity including work.  - For 3 days: No heavy lifting, straining or running.  - Diet: Eat and drink normally unless instructed otherwise.  NO ANTICOAGULATION MED SUCH AS ASPIRIN FOR 10 DAYS.  Treatment for Common Side Effects:  - Mild abdominal pain and bloating or excessive gas: rest, eat lightly and use a heating pad.     Symptoms to watch for and report to your physician:  1. Severe abdominal pain.  2. Fever within 24 hours after a procedure.  3. A large amount of rectal bleeding. (A small amount of blood from the rectum is not serious, especially if hemorrhoids are present.)  4. Because air was put into your colon during the procedure, expelling large amount of air from your rectum is normal.  5. You may not have a bowel movement for 1-3 days because of the colonoscopy prep. This is normal.  6. Go directly to the emergency room if you notice any of the following:     Chills and/or fever over 101   Persistent vomiting   Severe abdominal pain, other than gas cramps   Severe chest pain   Black, tarry stools   Any bleeding - exceeding one tablespoon    If you have any questions or problems, please call your Physician:    Laura Ramirez MD      Lab Results: Contact Physician's Office      If a complication or emergency situation arises and you are unable to reach your Physician - GO TO THE EMERGENCY ROOM.

## 2017-08-15 ENCOUNTER — TELEPHONE (OUTPATIENT)
Dept: CARDIOLOGY | Facility: CLINIC | Age: 62
End: 2017-08-15

## 2017-08-15 NOTE — TELEPHONE ENCOUNTER
----- Message from EMMANUEL Mcgregor, ANP sent at 8/15/2017  9:49 AM CDT -----  Contact: Edda BENITEZ  Please let patient know all of her labs from last week were normal (I wasn't sure if she was notified when she was admitted last week). She should follow up with Dr. Napoles as scheduled on 8/25    Gaby Bañuelos

## 2017-08-25 ENCOUNTER — PATIENT MESSAGE (OUTPATIENT)
Dept: GASTROENTEROLOGY | Facility: HOSPITAL | Age: 62
End: 2017-08-25

## 2017-08-25 ENCOUNTER — OFFICE VISIT (OUTPATIENT)
Dept: CARDIOLOGY | Facility: CLINIC | Age: 62
End: 2017-08-25
Payer: OTHER GOVERNMENT

## 2017-08-25 VITALS
BODY MASS INDEX: 34.55 KG/M2 | SYSTOLIC BLOOD PRESSURE: 167 MMHG | OXYGEN SATURATION: 95 % | WEIGHT: 183 LBS | DIASTOLIC BLOOD PRESSURE: 102 MMHG | HEIGHT: 61 IN | HEART RATE: 78 BPM

## 2017-08-25 DIAGNOSIS — E78.5 HYPERLIPIDEMIA, UNSPECIFIED HYPERLIPIDEMIA TYPE: ICD-10-CM

## 2017-08-25 DIAGNOSIS — I48.0 PAROXYSMAL ATRIAL FIBRILLATION: Primary | ICD-10-CM

## 2017-08-25 DIAGNOSIS — E11.8 TYPE 2 DIABETES MELLITUS WITH COMPLICATION, WITH LONG-TERM CURRENT USE OF INSULIN: ICD-10-CM

## 2017-08-25 DIAGNOSIS — Z79.4 TYPE 2 DIABETES MELLITUS WITH COMPLICATION, WITH LONG-TERM CURRENT USE OF INSULIN: ICD-10-CM

## 2017-08-25 DIAGNOSIS — I10 ESSENTIAL HYPERTENSION: ICD-10-CM

## 2017-08-25 PROCEDURE — 3008F BODY MASS INDEX DOCD: CPT | Mod: S$GLB,,, | Performed by: INTERNAL MEDICINE

## 2017-08-25 PROCEDURE — 3080F DIAST BP >= 90 MM HG: CPT | Mod: S$GLB,,, | Performed by: INTERNAL MEDICINE

## 2017-08-25 PROCEDURE — 3077F SYST BP >= 140 MM HG: CPT | Mod: S$GLB,,, | Performed by: INTERNAL MEDICINE

## 2017-08-25 PROCEDURE — 99999 PR PBB SHADOW E&M-EST. PATIENT-LVL III: CPT | Mod: PBBFAC,,, | Performed by: INTERNAL MEDICINE

## 2017-08-25 PROCEDURE — 4010F ACE/ARB THERAPY RXD/TAKEN: CPT | Mod: S$GLB,,, | Performed by: INTERNAL MEDICINE

## 2017-08-25 PROCEDURE — 99214 OFFICE O/P EST MOD 30 MIN: CPT | Mod: S$GLB,,, | Performed by: INTERNAL MEDICINE

## 2017-08-25 RX ORDER — METOPROLOL SUCCINATE 100 MG/1
100 TABLET, EXTENDED RELEASE ORAL DAILY
Qty: 90 TABLET | Refills: 3 | Status: SHIPPED | OUTPATIENT
Start: 2017-08-25 | End: 2018-08-20 | Stop reason: SDUPTHER

## 2017-08-25 NOTE — PROGRESS NOTES
"Subjective:    Patient ID:  Jeanine Chandler is a 62 y.o. female who presents for evaluation of Atrial Fibrillation      HPI  61 y/o female with hx of HTN, HLD, DM who presents for evaluation of recently diagnosed Afib witih RVR. She was in hospital awaiting EGD and ECG with Afib with RVR. Changed to metoprolol and started on ASA. ECG prior to discharge showed sinus lucia. ECG today in clinic back in Afib. She denies CP, SOB/SALINAS, orthopnea, PND, syncope, palps, LE edema. TSH normal. Claims to have a hx of "tachycardia". Does not smoke nor consume EtOH regularly.     Review of Systems   Constitution: Negative for weakness and malaise/fatigue.   HENT: Negative for congestion and headaches.    Eyes: Negative for blurred vision.   Cardiovascular: Negative for chest pain, claudication, cyanosis, dyspnea on exertion, irregular heartbeat, leg swelling, near-syncope, orthopnea, palpitations, paroxysmal nocturnal dyspnea and syncope.   Respiratory: Negative for shortness of breath.    Endocrine: Negative for polyuria.   Hematologic/Lymphatic: Negative for bleeding problem.   Skin: Negative for itching and rash.   Musculoskeletal: Negative for joint swelling, muscle cramps and muscle weakness.   Gastrointestinal: Negative for abdominal pain, hematemesis, hematochezia, melena, nausea and vomiting.   Genitourinary: Negative for dysuria and hematuria.   Neurological: Negative for dizziness, focal weakness, light-headedness and loss of balance.   Psychiatric/Behavioral: Negative for depression. The patient is not nervous/anxious.         Objective:    Physical Exam   Constitutional: She is oriented to person, place, and time. She appears well-developed and well-nourished.   HENT:   Head: Normocephalic and atraumatic.   Neck: Neck supple. No JVD present.   Cardiovascular: Normal heart sounds.  An irregularly irregular rhythm present. Tachycardia present.    Pulses:       Carotid pulses are 2+ on the right side, and 2+ on the left " side.       Radial pulses are 2+ on the right side, and 2+ on the left side.        Femoral pulses are 2+ on the right side, and 2+ on the left side.       Dorsalis pedis pulses are 2+ on the right side, and 2+ on the left side.        Posterior tibial pulses are 2+ on the right side, and 2+ on the left side.   Pulmonary/Chest: Effort normal and breath sounds normal.   Abdominal: Soft. Bowel sounds are normal.   Musculoskeletal: She exhibits no edema.   Neurological: She is alert and oriented to person, place, and time.   Skin: Skin is warm and dry.   Psychiatric: She has a normal mood and affect. Her behavior is normal. Thought content normal.         Assessment:       1. Paroxysmal atrial fibrillation    2. Hyperlipidemia, unspecified hyperlipidemia type    3. Essential hypertension    4. Type 2 diabetes mellitus with complication, with long-term current use of insulin      63 y/o female with hx and presentation as above. We discussed the etiology, evaluation, and management of Afib. Elevated CHADSVasc of 3 (HTN, DM, female) and have opted for anti-coagulation with Xarelto for stroke prevention. Will attempt rate control strategy immediately with increase in metoprolol. Will schedule for AHSAN/DCCV. Will obtain 2DE. If DCCV fails then will obtain Holter/event monitor for afib burden, review echo findings and determine if rhythm control strategy with anti-arrhythmics is reasonable. She was told to limit caffeine consumption.      Plan:       -2DE with CFD  -Increase Toprol to 100 mg daily  -Start Xarelto 20 mg daily  -Schedule for AHSAN/DCCV  -F/u in 3 months

## 2017-08-29 DIAGNOSIS — I48.19 PERSISTENT ATRIAL FIBRILLATION: Primary | ICD-10-CM

## 2017-08-29 DIAGNOSIS — I48.91 ATRIAL FIBRILLATION: ICD-10-CM

## 2017-08-31 ENCOUNTER — TELEPHONE (OUTPATIENT)
Dept: CARDIOLOGY | Facility: CLINIC | Age: 62
End: 2017-08-31

## 2017-08-31 ENCOUNTER — HOSPITAL ENCOUNTER (OUTPATIENT)
Dept: CARDIOLOGY | Facility: HOSPITAL | Age: 62
Discharge: HOME | End: 2017-08-31
Attending: INTERNAL MEDICINE
Payer: OTHER GOVERNMENT

## 2017-08-31 DIAGNOSIS — I48.0 PAROXYSMAL ATRIAL FIBRILLATION: ICD-10-CM

## 2017-08-31 LAB
DIASTOLIC DYSFUNCTION: NO
RETIRED EF AND QEF - SEE NOTES: 55 (ref 55–65)

## 2017-08-31 PROCEDURE — 93306 TTE W/DOPPLER COMPLETE: CPT | Mod: 26,,, | Performed by: INTERNAL MEDICINE

## 2017-08-31 PROCEDURE — 93306 TTE W/DOPPLER COMPLETE: CPT

## 2017-08-31 NOTE — TELEPHONE ENCOUNTER
----- Message from Antonia Ibarra sent at 8/31/2017  1:55 PM CDT -----  Contact: Self [phone]  Patient is calling to talk to nurse concerning questions on her procedure. Please advice

## 2017-08-31 NOTE — TELEPHONE ENCOUNTER
----- Message from Antonia Ibarra sent at 8/31/2017  1:55 PM CDT -----  Contact: Self [phone]  Patient is calling to talk to nurse concerning questions on her procedure. Please advice

## 2017-09-01 ENCOUNTER — TELEPHONE (OUTPATIENT)
Dept: CARDIOLOGY | Facility: CLINIC | Age: 62
End: 2017-09-01

## 2017-09-01 NOTE — TELEPHONE ENCOUNTER
----- Message from Amy Benavides sent at 9/1/2017  2:59 PM CDT -----  No. [phone]    Patient would like to cancel the procedure scheduled for 9/6/17.  She does not want to reschedule.

## 2017-10-16 RX ORDER — METFORMIN HYDROCHLORIDE 1000 MG/1
TABLET ORAL
Qty: 180 TABLET | Refills: 2 | Status: SHIPPED | OUTPATIENT
Start: 2017-10-16 | End: 2019-04-01 | Stop reason: SDUPTHER

## 2017-10-30 RX ORDER — LISINOPRIL AND HYDROCHLOROTHIAZIDE 12.5; 2 MG/1; MG/1
TABLET ORAL
Qty: 90 TABLET | Refills: 3 | Status: SHIPPED | OUTPATIENT
Start: 2017-10-30 | End: 2018-05-17 | Stop reason: SDUPTHER

## 2017-12-05 ENCOUNTER — OFFICE VISIT (OUTPATIENT)
Dept: CARDIOLOGY | Facility: CLINIC | Age: 62
End: 2017-12-05
Payer: OTHER GOVERNMENT

## 2017-12-05 VITALS
SYSTOLIC BLOOD PRESSURE: 137 MMHG | BODY MASS INDEX: 34.39 KG/M2 | WEIGHT: 182.13 LBS | HEART RATE: 59 BPM | OXYGEN SATURATION: 99 % | DIASTOLIC BLOOD PRESSURE: 76 MMHG | HEIGHT: 61 IN

## 2017-12-05 DIAGNOSIS — I48.0 PAROXYSMAL ATRIAL FIBRILLATION: Primary | ICD-10-CM

## 2017-12-05 DIAGNOSIS — E11.8 TYPE 2 DIABETES MELLITUS WITH COMPLICATION, WITH LONG-TERM CURRENT USE OF INSULIN: ICD-10-CM

## 2017-12-05 DIAGNOSIS — I10 ESSENTIAL HYPERTENSION: ICD-10-CM

## 2017-12-05 DIAGNOSIS — Z79.4 TYPE 2 DIABETES MELLITUS WITH COMPLICATION, WITH LONG-TERM CURRENT USE OF INSULIN: ICD-10-CM

## 2017-12-05 DIAGNOSIS — E78.5 HYPERLIPIDEMIA, UNSPECIFIED HYPERLIPIDEMIA TYPE: ICD-10-CM

## 2017-12-05 PROCEDURE — 93000 ELECTROCARDIOGRAM COMPLETE: CPT | Mod: S$GLB,,, | Performed by: INTERNAL MEDICINE

## 2017-12-05 PROCEDURE — 99999 PR PBB SHADOW E&M-EST. PATIENT-LVL III: CPT | Mod: PBBFAC,,, | Performed by: INTERNAL MEDICINE

## 2017-12-05 PROCEDURE — 99214 OFFICE O/P EST MOD 30 MIN: CPT | Mod: S$GLB,,, | Performed by: INTERNAL MEDICINE

## 2017-12-05 NOTE — PROGRESS NOTES
Subjective:    Patient ID:  Jeanine Chandler is a 62 y.o. female who presents for follow-up of Atrial Fibrillation      HPI  63 y/o female with hx of AFib with RVR on chronic anticoagulation with Xarelto (CHADSVasc 3), HTN, HLD, DM who presents for f/u.  Last clinic visit was seen after recently diagnosed Afib incidentally noted at EGD. BB dose increased and started on Xarelto. Was planning on AHSAN/DCCV, however, due to insurance issues, was cancelled. She is in SR today and tolerating meds well. She denies CP, SOB/SALINAS, orthopnea, PND, syncope, palps, LE edema. TSH normal.     Review of Systems   Constitution: Negative for weakness and malaise/fatigue.   HENT: Negative for congestion.    Eyes: Negative for blurred vision.   Cardiovascular: Negative for chest pain, claudication, cyanosis, dyspnea on exertion, irregular heartbeat, leg swelling, near-syncope, orthopnea, palpitations, paroxysmal nocturnal dyspnea and syncope.   Respiratory: Negative for shortness of breath.    Endocrine: Negative for polyuria.   Hematologic/Lymphatic: Negative for bleeding problem.   Skin: Negative for itching and rash.   Musculoskeletal: Negative for joint swelling, muscle cramps and muscle weakness.   Gastrointestinal: Negative for abdominal pain, hematemesis, hematochezia, melena, nausea and vomiting.   Genitourinary: Negative for dysuria and hematuria.   Neurological: Negative for dizziness, focal weakness, headaches, light-headedness and loss of balance.   Psychiatric/Behavioral: Negative for depression. The patient is not nervous/anxious.         Objective:    Physical Exam   Constitutional: She is oriented to person, place, and time. She appears well-developed and well-nourished.   HENT:   Head: Normocephalic and atraumatic.   Neck: Neck supple. No JVD present.   Cardiovascular: Normal rate, regular rhythm and normal heart sounds.    Pulses:       Carotid pulses are 2+ on the right side, and 2+ on the left side.       Radial  pulses are 2+ on the right side, and 2+ on the left side.        Femoral pulses are 2+ on the right side, and 2+ on the left side.       Dorsalis pedis pulses are 2+ on the right side, and 2+ on the left side.        Posterior tibial pulses are 2+ on the right side, and 2+ on the left side.   Pulmonary/Chest: Effort normal and breath sounds normal.   Abdominal: Soft. Bowel sounds are normal.   Musculoskeletal: She exhibits no edema.   Neurological: She is alert and oriented to person, place, and time.   Skin: Skin is warm and dry.   Psychiatric: She has a normal mood and affect. Her behavior is normal. Thought content normal.         Assessment:       1. Paroxysmal atrial fibrillation    2. Hyperlipidemia, unspecified hyperlipidemia type    3. Essential hypertension    4. Type 2 diabetes mellitus with complication, with long-term current use of insulin      61 y/o female with hx and presentation as above. Doing well from a cardiac perspective, back in SR, and tolerating meds well. Will continue with current BB dose and anticoagulation for now.        Plan:       -Continue current medical management  -F/u in 1 year

## 2017-12-19 RX ORDER — SITAGLIPTIN 100 MG/1
TABLET, FILM COATED ORAL
Qty: 90 TABLET | Refills: 3 | Status: SHIPPED | OUTPATIENT
Start: 2017-12-19 | End: 2018-12-28 | Stop reason: SDUPTHER

## 2017-12-19 RX ORDER — METFORMIN HYDROCHLORIDE 1000 MG/1
TABLET ORAL
Qty: 180 TABLET | Refills: 2 | Status: SHIPPED | OUTPATIENT
Start: 2017-12-19 | End: 2019-04-01 | Stop reason: SDUPTHER

## 2018-05-16 RX ORDER — INSULIN GLARGINE 100 [IU]/ML
INJECTION, SOLUTION SUBCUTANEOUS
Qty: 45 SYRINGE | Refills: 0 | OUTPATIENT
Start: 2018-05-16

## 2018-05-17 ENCOUNTER — OFFICE VISIT (OUTPATIENT)
Dept: FAMILY MEDICINE | Facility: CLINIC | Age: 63
End: 2018-05-17
Payer: MEDICARE

## 2018-05-17 VITALS
SYSTOLIC BLOOD PRESSURE: 159 MMHG | BODY MASS INDEX: 34.55 KG/M2 | OXYGEN SATURATION: 98 % | DIASTOLIC BLOOD PRESSURE: 92 MMHG | WEIGHT: 183 LBS | TEMPERATURE: 99 F | HEART RATE: 76 BPM | HEIGHT: 61 IN

## 2018-05-17 DIAGNOSIS — Z23 NEED FOR PROPHYLACTIC VACCINATION AGAINST STREPTOCOCCUS PNEUMONIAE (PNEUMOCOCCUS): ICD-10-CM

## 2018-05-17 DIAGNOSIS — M85.80 OSTEOPENIA, UNSPECIFIED LOCATION: ICD-10-CM

## 2018-05-17 DIAGNOSIS — Z79.4 TYPE 2 DIABETES MELLITUS WITH COMPLICATION, WITH LONG-TERM CURRENT USE OF INSULIN: ICD-10-CM

## 2018-05-17 DIAGNOSIS — I48.0 PAROXYSMAL ATRIAL FIBRILLATION: ICD-10-CM

## 2018-05-17 DIAGNOSIS — R53.83 FATIGUE, UNSPECIFIED TYPE: ICD-10-CM

## 2018-05-17 DIAGNOSIS — Z11.59 ENCOUNTER FOR HEPATITIS C SCREENING TEST FOR LOW RISK PATIENT: ICD-10-CM

## 2018-05-17 DIAGNOSIS — I10 ESSENTIAL HYPERTENSION: Primary | ICD-10-CM

## 2018-05-17 DIAGNOSIS — D05.12 DUCTAL CARCINOMA IN SITU (DCIS) OF LEFT BREAST: ICD-10-CM

## 2018-05-17 DIAGNOSIS — J45.909 ASTHMA, UNSPECIFIED ASTHMA SEVERITY, UNSPECIFIED WHETHER COMPLICATED, UNSPECIFIED WHETHER PERSISTENT: ICD-10-CM

## 2018-05-17 DIAGNOSIS — N95.1 MENOPAUSAL STATE: ICD-10-CM

## 2018-05-17 DIAGNOSIS — K21.9 GASTROESOPHAGEAL REFLUX DISEASE, ESOPHAGITIS PRESENCE NOT SPECIFIED: ICD-10-CM

## 2018-05-17 DIAGNOSIS — E78.5 HYPERLIPIDEMIA, UNSPECIFIED HYPERLIPIDEMIA TYPE: ICD-10-CM

## 2018-05-17 DIAGNOSIS — E11.8 TYPE 2 DIABETES MELLITUS WITH COMPLICATION, WITH LONG-TERM CURRENT USE OF INSULIN: ICD-10-CM

## 2018-05-17 DIAGNOSIS — E55.9 VITAMIN D DEFICIENCY: ICD-10-CM

## 2018-05-17 PROCEDURE — 99215 OFFICE O/P EST HI 40 MIN: CPT | Mod: 25,S$PBB,, | Performed by: FAMILY MEDICINE

## 2018-05-17 PROCEDURE — G0009 ADMIN PNEUMOCOCCAL VACCINE: HCPCS | Mod: PBBFAC,PO

## 2018-05-17 PROCEDURE — 99214 OFFICE O/P EST MOD 30 MIN: CPT | Mod: PBBFAC,PO | Performed by: FAMILY MEDICINE

## 2018-05-17 PROCEDURE — 99999 PR PBB SHADOW E&M-EST. PATIENT-LVL IV: CPT | Mod: PBBFAC,,, | Performed by: FAMILY MEDICINE

## 2018-05-17 RX ORDER — LANCETS
EACH MISCELLANEOUS
Qty: 100 EACH | Refills: 11 | Status: SHIPPED | OUTPATIENT
Start: 2018-05-17 | End: 2018-05-21 | Stop reason: SDUPTHER

## 2018-05-17 RX ORDER — FLUTICASONE PROPIONATE 110 UG/1
AEROSOL, METERED RESPIRATORY (INHALATION)
Qty: 36 G | Refills: 11 | Status: SHIPPED | OUTPATIENT
Start: 2018-05-17 | End: 2018-08-10

## 2018-05-17 RX ORDER — LISINOPRIL AND HYDROCHLOROTHIAZIDE 12.5; 2 MG/1; MG/1
2 TABLET ORAL DAILY
Qty: 180 TABLET | Refills: 3 | Status: SHIPPED | OUTPATIENT
Start: 2018-05-17 | End: 2019-04-01 | Stop reason: SDUPTHER

## 2018-05-17 RX ORDER — INSULIN PUMP SYRINGE, 3 ML
EACH MISCELLANEOUS
Qty: 1 EACH | Refills: 0 | Status: SHIPPED | OUTPATIENT
Start: 2018-05-17 | End: 2018-05-22 | Stop reason: SDUPTHER

## 2018-05-17 NOTE — PROGRESS NOTES
(Portions of this note were dictated using voice recognition software and may contain dictation related errors in spelling/grammar/syntax not found on text review)    CC:  Medical follow-up.    HPI: 63 y.o. female     Last visit was in December 2015.  Here for medical follow-up    Diabetes:  On Januvia 100 mg daily, metformin 1000 mg b.i.d..  Also on Lantus advised to increase to 40 units daily at time of last appointment (she had mentioned that she self decreased her Lantus down to 30 units secondary to some concerns about hypoglycemia at the time).  Last A1c in 2015 was not controlled as noted below at 8.3.  She is not checking her blood sugars.  Asa; no bc on xarelto  Eye: jan 2018  Dentist every 6 mo  Neuropathy: no    Hypertension on metoprolol 100 mg daily, lisinopril HCT 20/12.5 mg daily.  Blood pressure is not controlled today    Atrial fibrillation diagnosed last year in 2017.  Since has followed up with Cardiology in August 2017.  Beta blocker changed from bisoprolol over to metoprolol as above.  On Xarelto for anticoagulation    Hyperlipidemia was on simvastatin 10 mg daily and fenofibrate 145 mg daily but discontinued both medications.    Asthma was on Flovent twice daily but got off of these.  She feels like her asthma is not controlled, albuterol as needed    Anxiety and depression on Lexapro 20 mg daily    Past vitamin-D deficiency and osteopenia noted on record.    GERD was on Zantac and Prevacid (but stopped both), reflux is problematic, feels like she needs something.     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; s/p lumpectomy and treated on protocol NSABP       Diabetes mellitus     DJD (degenerative joint disease) of cervical spine 2005    Dysphonia     GERD (gastroesophageal reflux disease)     Hyperlipidemia     Hypertension     Osteopenia     Vitamin D deficiency disease         Past Surgical History:   Procedure Laterality Date     elbow      right    BREAST SURGERY       SECTION  1979    1    COLONOSCOPY N/A 2017    Procedure: COLONOSCOPY Golytely;  Surgeon: Laura Ramirez MD;  Location: [facility];  Service: Endoscopy;  Laterality: N/A;    HERNIA REPAIR      left inguinal    HYSTERECTOMY      JOINT REPLACEMENT      left knee    laparscopic      exploratory    myomectomy  1982       Family History   Problem Relation Age of Onset    Diabetes Mother     Heart disease Mother     Cancer Mother     Atrial fibrillation Mother     Stroke Sister     Heart disease Maternal Uncle        Social History     Social History    Marital status:      Spouse name: N/A    Number of children: N/A    Years of education: N/A     Occupational History    Not on file.     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 Ramirez.  Diverticulosis noted sigmoid colon and descending colon and ascending colon.  3 polyps, 10-18 mm, found and proximal descending colon and cecum, adenomas, recommended repeat colonoscopy in 3 years.     Mammogram:  2017, digital diagnostic bilateral, normal.     Echo with Doppler 2017:  CONCLUSIONS     1 - No wall motion abnormalities.     2 - Normal left ventricular systolic function (EF 55-60%).     3 - Normal left ventricular diastolic function.     4 - Normal right ventricular systolic function .     Immunizations:  Adacel 9/4/15   Pneumovax 2010  Prevnar is due     Labs done through [facility] in 2015 demonstrate normal micro-BUN creatinine ratio.  LDL of 115.  Triglycerides 177.  Glucose 128.  Creatinine  0.97.  A1c is 8.3.  H&H is 11.7/36.3.  LFTs normal.             GENERAL:  Positive for fatigue.  SKIN: No rashes, no itching.  HEAD: No headaches.  EYES: No visual changes  EARS: No ear pain or changes in hearing.  NOSE: No congestion or rhinorrhea.  MOUTH & THROAT: No hoarseness, change in voice, or sore throat.  NODES: Denies swollen glands.  CHEST: Denies SALINAS, cyanosis, wheezing, cough and sputum production.  CARDIOVASCULAR: Denies chest pain, PND, orthopnea.  ABDOMEN: No nausea, vomiting, or changes in bowel function.  URINARY: No flank pain, dysuria or hematuria.  PERIPHERAL VASCULAR: No claudication or cyanosis.  MUSCULOSKELETAL: No joint stiffness or swelling. Denies back pain.  NEUROLOGIC: No weakness or numbness.    Vital signs reviewed  PE:   APPEARANCE: Well nourished, well developed, in no acute distress.    HEAD: Normocephalic, atraumatic.  EYES: PERRL. EOMI.   Conjunctivae noninjected.  EARS: TM's intact. Light reflex normal. No retraction or perforation  NOSE: Mucosa pink. Airway clear.  MOUTH & THROAT: No tonsillar enlargement. No pharyngeal erythema or exudate.   NECK: Supple with no cervical lymphadenopathy.    CHEST: Good inspiratory effort. Lungs clear to auscultation with no wheezes or crackles.  CARDIOVASCULAR: Normal S1, S2. No rubs, murmurs, or gallops.  ABDOMEN: Bowel sounds normal. Not distended. Soft. No tenderness or masses. No organomegaly.  EXTREMITIES: No edema, cyanosis, or clubbing.  DIABETIC FOOT EXAM: Protective Sensation (w/ 10 gram monofilament):  Right: Intact  Left: Intact    Visual Inspection:  Normal -  Bilateral    Pedal Pulses:   Right: Present  Left: Present    Posterior tibialis:   Right:Present  Left: Present          IMPRESSION  1. Essential hypertension    2. Type 2 diabetes mellitus with complication, with long-term current use of insulin    3. Hyperlipidemia, unspecified hyperlipidemia type    4. Paroxysmal atrial fibrillation    5. Ductal carcinoma in situ (DCIS) of  left breast    6. Asthma, unspecified asthma severity, unspecified whether complicated, unspecified whether persistent    7. Gastroesophageal reflux disease, esophagitis presence not specified    8. Vitamin D deficiency    9. Menopausal state    10. Encounter for hepatitis C screening test for low risk patient    11. Need for prophylactic vaccination against Streptococcus pneumoniae (pneumococcus)    12. Osteopenia, unspecified location    13. Fatigue, unspecified type            PLAN  Diabetes health maintenance  -advise proper dietary and exercise modification  -advise medication compliance  -advise daily aspirin if there are no other contraindications  -advise consistent blood sugar monitoring  -advise regular dentist and ophthalmology visits  -advise regular foot checks  -Medication management:  Continue metformin 1000 mg b.i.d., Januvia 100 mg daily, Lantus 40 units daily.  Check labs below.  Advise blood sugar monitoring, equipment sent to pharmacy.  Advise fasting blood sugar and 2 are postprandial blood sugar with her biggest meal.  Goal fasting blood sugar should be     HTN:  Not controlled.  Continue metoprolol 100 mg daily.  Increase lisinopril HCT 20/12.5 to 2 pills daily.  Follow back up in 1 month    HLD: , check labs.  Will likely start back on a statin.  We can hold fenofibrate unless triglycerides are significantly elevated.    Asthma restart flovent    Gerd: restart zantac    Afib: continue rate control, anticoag, card f/u as directed.     DCIS history:  Continue Hematology Oncology follow-up    Health maintenance:  Reviewed as above.  Needs Prevnar, DEXA    Orders Placed This Encounter   Procedures    DXA Bone Density Spine And Hip    (In Office Administered) Pneumococcal Conjugate Vaccine (13 Valent) (IM)    CBC auto differential    Comprehensive metabolic panel    Hemoglobin A1c    Lipid panel    Microalbumin/creatinine urine ratio    TSH    Vitamin D    Hepatitis C antibody      Follow back up in 1 month for blood pressure recheck and with blood sugar log to review any necessity to titrate her Lantus

## 2018-05-21 ENCOUNTER — HOSPITAL ENCOUNTER (OUTPATIENT)
Dept: RADIOLOGY | Facility: HOSPITAL | Age: 63
Discharge: HOME | End: 2018-05-21
Attending: FAMILY MEDICINE
Payer: MEDICARE

## 2018-05-21 ENCOUNTER — PATIENT MESSAGE (OUTPATIENT)
Dept: FAMILY MEDICINE | Facility: CLINIC | Age: 63
End: 2018-05-21

## 2018-05-21 DIAGNOSIS — N95.1 MENOPAUSAL STATE: ICD-10-CM

## 2018-05-21 DIAGNOSIS — E55.9 VITAMIN D DEFICIENCY: ICD-10-CM

## 2018-05-21 DIAGNOSIS — E11.8 TYPE 2 DIABETES MELLITUS WITH COMPLICATION, WITH LONG-TERM CURRENT USE OF INSULIN: ICD-10-CM

## 2018-05-21 DIAGNOSIS — I10 ESSENTIAL HYPERTENSION: ICD-10-CM

## 2018-05-21 DIAGNOSIS — K21.9 GASTROESOPHAGEAL REFLUX DISEASE, ESOPHAGITIS PRESENCE NOT SPECIFIED: ICD-10-CM

## 2018-05-21 DIAGNOSIS — D05.12 DUCTAL CARCINOMA IN SITU (DCIS) OF LEFT BREAST: ICD-10-CM

## 2018-05-21 DIAGNOSIS — J45.909 ASTHMA, UNSPECIFIED ASTHMA SEVERITY, UNSPECIFIED WHETHER COMPLICATED, UNSPECIFIED WHETHER PERSISTENT: ICD-10-CM

## 2018-05-21 DIAGNOSIS — I48.0 PAROXYSMAL ATRIAL FIBRILLATION: ICD-10-CM

## 2018-05-21 DIAGNOSIS — Z11.59 ENCOUNTER FOR HEPATITIS C SCREENING TEST FOR LOW RISK PATIENT: ICD-10-CM

## 2018-05-21 DIAGNOSIS — E78.5 HYPERLIPIDEMIA, UNSPECIFIED HYPERLIPIDEMIA TYPE: ICD-10-CM

## 2018-05-21 DIAGNOSIS — Z79.4 TYPE 2 DIABETES MELLITUS WITH COMPLICATION, WITH LONG-TERM CURRENT USE OF INSULIN: ICD-10-CM

## 2018-05-21 PROCEDURE — 77080 DXA BONE DENSITY AXIAL: CPT | Mod: 26,,, | Performed by: RADIOLOGY

## 2018-05-21 PROCEDURE — 77080 DXA BONE DENSITY AXIAL: CPT | Mod: TC,PO

## 2018-05-21 RX ORDER — ALBUTEROL SULFATE 90 UG/1
2 AEROSOL, METERED RESPIRATORY (INHALATION) EVERY 4 HOURS PRN
Qty: 3 INHALER | Refills: 3 | Status: SHIPPED | OUTPATIENT
Start: 2018-05-21 | End: 2019-07-01 | Stop reason: SDUPTHER

## 2018-05-21 RX ORDER — LANCETS
EACH MISCELLANEOUS
Qty: 100 EACH | Refills: 11 | Status: SHIPPED | OUTPATIENT
Start: 2018-05-21 | End: 2018-05-22 | Stop reason: SDUPTHER

## 2018-05-21 NOTE — TELEPHONE ENCOUNTER
----- Message from Antonia Ibarra sent at 5/21/2018  9:43 AM CDT -----  Contact: Self [phone]  Patient is calling to get refills on her medication sent to [facility]/pharmacy #[id number] - [address] [facility] [phone] (Fax) and she needs a 90 day supply on her medication         1. ranitidine (ZANTAC) 150 MG tablet 30 tablet     2. blood sugar diagnostic (CONTOUR TEST STRIPS) Strp 100 strip     3. Lancets     4. albuterol (PROAIR HFA) 90 mcg/actuation HFAA 3 Inhaler     5. Flonase

## 2018-05-21 NOTE — TELEPHONE ENCOUNTER
Patient states her prescriptions should be 90 days.  She is also requesting a prescription for flonase.  Please advise.

## 2018-05-22 ENCOUNTER — TELEPHONE (OUTPATIENT)
Dept: FAMILY MEDICINE | Facility: CLINIC | Age: 63
End: 2018-05-22

## 2018-05-22 RX ORDER — LANCETS
EACH MISCELLANEOUS
Qty: 300 EACH | Refills: 11 | Status: SHIPPED | OUTPATIENT
Start: 2018-05-22 | End: 2020-09-16

## 2018-05-22 RX ORDER — INSULIN PUMP SYRINGE, 3 ML
EACH MISCELLANEOUS
Qty: 1 EACH | Refills: 0 | Status: SHIPPED | OUTPATIENT
Start: 2018-05-22 | End: 2020-09-16

## 2018-05-22 NOTE — TELEPHONE ENCOUNTER
Spoke with patient. She is very upset. She wanted her scripts switched to 90 days. She said she never recieved a return call from the telephone message

## 2018-05-26 LAB
1,25(OH)2D SERPL-MCNC: <8 PG/ML (ref 18–72)
1,25(OH)2D2 SERPL-MCNC: <8 PG/ML
1,25(OH)2D3 SERPL-MCNC: <8 PG/ML
ALBUMIN SERPL-MCNC: 4.1 G/DL (ref 3.6–5.1)
ALBUMIN/CREAT UR: 3 MCG/MG CREAT
ALBUMIN/GLOB SERPL: 1.5 (CALC) (ref 1–2.5)
ALP SERPL-CCNC: 75 U/L (ref 33–130)
ALT SERPL-CCNC: 16 U/L (ref 6–29)
AST SERPL-CCNC: 14 U/L (ref 10–35)
BASOPHILS # BLD AUTO: 59 CELLS/UL (ref 0–200)
BASOPHILS NFR BLD AUTO: 0.9 %
BILIRUB SERPL-MCNC: 0.4 MG/DL (ref 0.2–1.2)
BUN SERPL-MCNC: 17 MG/DL (ref 7–25)
BUN/CREAT SERPL: ABNORMAL (CALC) (ref 6–22)
CALCIUM SERPL-MCNC: 9.4 MG/DL (ref 8.6–10.4)
CHLORIDE SERPL-SCNC: 100 MMOL/L (ref 98–110)
CHOLEST SERPL-MCNC: 195 MG/DL
CHOLEST/HDLC SERPL: 6.1 (CALC)
CO2 SERPL-SCNC: 27 MMOL/L (ref 20–31)
CREAT SERPL-MCNC: 0.96 MG/DL (ref 0.5–0.99)
CREAT UR-MCNC: 195 MG/DL (ref 20–320)
EOSINOPHIL # BLD AUTO: 163 CELLS/UL (ref 15–500)
EOSINOPHIL NFR BLD AUTO: 2.5 %
ERYTHROCYTE [DISTWIDTH] IN BLOOD BY AUTOMATED COUNT: 13.5 % (ref 11–15)
GFR SERPL CREATININE-BSD FRML MDRD: 62 ML/MIN/1.73M2
GLOBULIN SER CALC-MCNC: 2.7 G/DL (CALC) (ref 1.9–3.7)
GLUCOSE SERPL-MCNC: 262 MG/DL (ref 65–99)
HBA1C MFR BLD: 9.6 % OF TOTAL HGB
HCT VFR BLD AUTO: 37.7 % (ref 35–45)
HCV AB S/CO SERPL IA: 0.02
HCV AB SERPL QL IA: NORMAL
HDLC SERPL-MCNC: 32 MG/DL
HGB BLD-MCNC: 12.2 G/DL (ref 11.7–15.5)
LDLC SERPL CALC-MCNC: 121 MG/DL (CALC)
LYMPHOCYTES # BLD AUTO: 2236 CELLS/UL (ref 850–3900)
LYMPHOCYTES NFR BLD AUTO: 34.4 %
MCH RBC QN AUTO: 27.4 PG (ref 27–33)
MCHC RBC AUTO-ENTMCNC: 32.4 G/DL (ref 32–36)
MCV RBC AUTO: 84.7 FL (ref 80–100)
MICROALBUMIN UR-MCNC: 0.6 MG/DL
MONOCYTES # BLD AUTO: 384 CELLS/UL (ref 200–950)
MONOCYTES NFR BLD AUTO: 5.9 %
NEUTROPHILS # BLD AUTO: 3660 CELLS/UL (ref 1500–7800)
NEUTROPHILS NFR BLD AUTO: 56.3 %
NONHDLC SERPL-MCNC: 163 MG/DL (CALC)
PLATELET # BLD AUTO: 225 THOUSAND/UL (ref 140–400)
PMV BLD REES-ECKER: 11.2 FL (ref 7.5–12.5)
POTASSIUM SERPL-SCNC: 4.6 MMOL/L (ref 3.5–5.3)
PROT SERPL-MCNC: 6.8 G/DL (ref 6.1–8.1)
RBC # BLD AUTO: 4.45 MILLION/UL (ref 3.8–5.1)
SODIUM SERPL-SCNC: 137 MMOL/L (ref 135–146)
TRIGL SERPL-MCNC: 271 MG/DL
TSH SERPL-ACNC: 2.15 MIU/L (ref 0.4–4.5)
WBC # BLD AUTO: 6.5 THOUSAND/UL (ref 3.8–10.8)

## 2018-05-28 RX ORDER — ATORVASTATIN CALCIUM 20 MG/1
20 TABLET, FILM COATED ORAL DAILY
Qty: 90 TABLET | Refills: 3 | Status: SHIPPED | OUTPATIENT
Start: 2018-05-28 | End: 2019-05-07 | Stop reason: SDUPTHER

## 2018-05-28 RX ORDER — ERGOCALCIFEROL 1.25 MG/1
50000 CAPSULE ORAL
Qty: 12 CAPSULE | Refills: 0 | Status: SHIPPED | OUTPATIENT
Start: 2018-05-28 | End: 2018-08-14

## 2018-06-29 RX ORDER — ESCITALOPRAM OXALATE 20 MG/1
TABLET ORAL
Qty: 90 TABLET | Refills: 0 | Status: SHIPPED | OUTPATIENT
Start: 2018-06-29 | End: 2018-09-27 | Stop reason: SDUPTHER

## 2018-08-03 ENCOUNTER — TELEPHONE (OUTPATIENT)
Dept: HEMATOLOGY/ONCOLOGY | Facility: CLINIC | Age: 63
End: 2018-08-03

## 2018-08-03 NOTE — TELEPHONE ENCOUNTER
----- Message from Diane Jovel sent at 8/3/2018  4:18 PM CDT -----  Contact: Pt  Called and scheduled patient and appointment to come in on Tuesday for her mammogram and to see Dr. Cam. She would like a nurse to call her as she has a question about her mole on her breast she had removed.   ----- Message -----  From: Betzy Marinelli  Sent: 8/3/2018   4:06 PM  To: Diane Jovel    Pt called to schedule appts  Callback#[phone]  Thank You  FARNAZ Marinelli

## 2018-08-03 NOTE — TELEPHONE ENCOUNTER
Called and scheduled patient and appointment to come in on Tuesday for her mammogram and to see Dr. Cam. She would like a nurse to call her as she has a question about her mole on her breast she had removed. I told her I would have the nurse call back shortly. Patient voiced appreciation

## 2018-08-03 NOTE — TELEPHONE ENCOUNTER
----- Message from Betzy Marinelli sent at 8/3/2018  4:06 PM CDT -----  Contact: Pt  Pt called to schedule appts  Callback#[phone]  Thank You  FARNAZ Marinelli

## 2018-08-10 ENCOUNTER — OFFICE VISIT (OUTPATIENT)
Dept: FAMILY MEDICINE | Facility: CLINIC | Age: 63
End: 2018-08-10
Payer: MEDICARE

## 2018-08-10 VITALS
BODY MASS INDEX: 34.13 KG/M2 | HEIGHT: 61 IN | SYSTOLIC BLOOD PRESSURE: 127 MMHG | TEMPERATURE: 98 F | OXYGEN SATURATION: 98 % | DIASTOLIC BLOOD PRESSURE: 79 MMHG | HEART RATE: 64 BPM | WEIGHT: 180.75 LBS

## 2018-08-10 DIAGNOSIS — K21.9 GASTROESOPHAGEAL REFLUX DISEASE, ESOPHAGITIS PRESENCE NOT SPECIFIED: ICD-10-CM

## 2018-08-10 DIAGNOSIS — M85.80 OSTEOPENIA, UNSPECIFIED LOCATION: ICD-10-CM

## 2018-08-10 DIAGNOSIS — J45.909 ASTHMA, UNSPECIFIED ASTHMA SEVERITY, UNSPECIFIED WHETHER COMPLICATED, UNSPECIFIED WHETHER PERSISTENT: ICD-10-CM

## 2018-08-10 DIAGNOSIS — I10 ESSENTIAL HYPERTENSION: Primary | ICD-10-CM

## 2018-08-10 DIAGNOSIS — D05.12 DUCTAL CARCINOMA IN SITU (DCIS) OF LEFT BREAST: ICD-10-CM

## 2018-08-10 DIAGNOSIS — M89.9 BONE DISORDER: ICD-10-CM

## 2018-08-10 DIAGNOSIS — E55.9 VITAMIN D DEFICIENCY: ICD-10-CM

## 2018-08-10 DIAGNOSIS — I48.0 PAROXYSMAL ATRIAL FIBRILLATION: ICD-10-CM

## 2018-08-10 DIAGNOSIS — E11.8 TYPE 2 DIABETES MELLITUS WITH COMPLICATION, WITH LONG-TERM CURRENT USE OF INSULIN: ICD-10-CM

## 2018-08-10 DIAGNOSIS — E78.5 HYPERLIPIDEMIA, UNSPECIFIED HYPERLIPIDEMIA TYPE: ICD-10-CM

## 2018-08-10 DIAGNOSIS — Z79.4 TYPE 2 DIABETES MELLITUS WITH COMPLICATION, WITH LONG-TERM CURRENT USE OF INSULIN: ICD-10-CM

## 2018-08-10 PROCEDURE — 99215 OFFICE O/P EST HI 40 MIN: CPT | Mod: S$PBB,,, | Performed by: FAMILY MEDICINE

## 2018-08-10 PROCEDURE — 99213 OFFICE O/P EST LOW 20 MIN: CPT | Mod: PBBFAC,PO | Performed by: FAMILY MEDICINE

## 2018-08-10 PROCEDURE — 99999 PR PBB SHADOW E&M-EST. PATIENT-LVL III: CPT | Mod: PBBFAC,,, | Performed by: FAMILY MEDICINE

## 2018-08-10 RX ORDER — FLUTICASONE PROPIONATE 220 UG/1
1 AEROSOL, METERED RESPIRATORY (INHALATION) 2 TIMES DAILY
Qty: 36 G | Refills: 11 | Status: SHIPPED | OUTPATIENT
Start: 2018-08-10 | End: 2019-07-01 | Stop reason: SDUPTHER

## 2018-08-10 RX ORDER — INSULIN GLARGINE 100 [IU]/ML
60 INJECTION, SOLUTION SUBCUTANEOUS DAILY
Qty: 45 ML | Refills: 11 | Status: SHIPPED | OUTPATIENT
Start: 2018-08-10 | End: 2019-04-01

## 2018-08-10 NOTE — PROGRESS NOTES
(Portions of this note were dictated using voice recognition software and may contain dictation related errors in spelling/grammar/syntax not found on text review)    CC:   Chief Complaint   Patient presents with    Follow-up     1 month    Hyperglycemia    Medication Refill       HPI: 63 y.o. female last visit in May to reestablish care    Diabetes:  On Januvia 100 mg daily, metformin 1000 mg b.i.d..  Lantus, advised after last set of labs as below where A1c was 9.6 that she should go up to 50 units daily on the Lantus and adjust herself titration protocol which was provided.  Currently at 50 units of Lantus.  Last couple of days she started getting sugars in the 200 range in the morning and was feeling dizzy.  However, today her sugar was 167, improving.  Her sugar the day prior to the onset of the above was 111.  No hypoglycemia.  She is not sure what may have set off her elevated blood sugars.  Asa; no bc on xarelto  Eye: jan 2018  Dentist every 6 mo  Neuropathy: no     Hypertension on metoprolol 100 mg daily, lisinopril HCT 20/12.5 mg increased to 2 pills daily.   at last visit secondary to uncontrolled hypertension     Atrial fibrillation diagnosed  in 2017.  Since has followed up with Cardiology in August 2017.  Beta blocker changed from bisoprolol over to metoprolol as above.  On Xarelto for anticoagulation     Hyperlipidemia, was on statin in the past but had stopped.  I started Lipitor 20 mg a day after last visit     Asthma , restarted Flovent for prophylaxis with albuterol as needed.  Still symptomatic with coughing and wheezing.     Anxiety and depression on Lexapro 20 mg daily     Vitamin-D deficiency, started early also for all for 12 weeks after last visit.  Has osteopenia.  She has completed the prescription therapy and is now taking vitamin-D over-the-counter, not sure of her dose.     GERD on Zantac, helping a lot.      DCIS history, followed by Hematology-Oncology       Past Medical History:    Diagnosis Date    Asthma     DCIS (ductal carcinoma in situ)     left breast; s/p lumpectomy and treated on protocol NSABP       Diabetes mellitus     DJD (degenerative joint disease) of cervical spine     Dysphonia     GERD (gastroesophageal reflux disease)     Hyperlipidemia     Hypertension     Osteopenia     Vitamin D deficiency disease        Past Surgical History:   Procedure Laterality Date     elbow      right    BREAST SURGERY       SECTION  1979    1    COLONOSCOPY N/A 2017    Procedure: COLONOSCOPY Golytely;  Surgeon: Laura Ramirez MD;  Location: [facility];  Service: Endoscopy;  Laterality: N/A;    HERNIA REPAIR      left inguinal    HYSTERECTOMY      JOINT REPLACEMENT      left knee    laparscopic      exploratory    myomectomy  1982       Family History   Problem Relation Age of Onset    Diabetes Mother     Heart disease Mother     Cancer Mother     Atrial fibrillation Mother     Stroke Sister     Heart disease Maternal Uncle        Social History     Social History    Marital status:      Spouse name: N/A    Number of children: N/A    Years of education: N/A     Occupational History    Not on file.     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  SKIN: No rashes, no itching.  HEAD: No headaches.  EYES: No visual changes  EARS: No ear pain or changes in hearing.  NOSE: No congestion or rhinorrhea.  MOUTH & THROAT: No hoarseness, change in voice, or sore throat.  NODES: Denies swollen glands.  CHEST:  Above  CARDIOVASCULAR: Denies chest pain, PND, orthopnea.  ABDOMEN: No nausea, vomiting, or changes in bowel function.  URINARY: No flank pain, dysuria or hematuria.  PERIPHERAL VASCULAR: No claudication or cyanosis.  MUSCULOSKELETAL: No joint stiffness or swelling. Denies back pain.  NEUROLOGIC: No weakness or numbness.    Vital signs reviewed  PE:   APPEARANCE: Well nourished, well developed, in no acute distress.    HEAD: Normocephalic, atraumatic.  EYES: PERRL. EOMI.   Conjunctivae noninjected.  EARS: TM's intact. Light reflex normal. No retraction or perforation  NOSE: Mucosa pink. Airway clear.  MOUTH & THROAT: No tonsillar enlargement. No pharyngeal erythema or exudate.   NECK: Supple with no cervical lymphadenopathy.  No carotid bruits.  No thyromegaly  CHEST: Good inspiratory effort. Lungs clear to auscultation with no wheezes or crackles.  CARDIOVASCULAR: Normal S1, S2. No rubs, murmurs, or gallops.  ABDOMEN: Bowel sounds normal. Not distended. Soft. No tenderness or masses. No organomegaly.  EXTREMITIES: No edema, cyanosis, or clubbing.  Ft exam up-to-date May of 2018        IMPRESSION    1. Essential hypertension    2. Type 2 diabetes mellitus with complication, with long-term current use of insulin    3. Hyperlipidemia, unspecified hyperlipidemia type    4. Paroxysmal atrial fibrillation    5. Vitamin D deficiency    6. Ductal carcinoma in situ (DCIS) of left breast    7. Osteopenia, unspecified location    8. Bone disorder    9. Asthma,  unspecified asthma severity, unspecified whether complicated, unspecified whether persistent    10. Gastroesophageal reflux disease, esophagitis presence not specified          PLAN  Hypertension controlled.  Continue current therapy    Diabetes health maintenance:  -- advise regular and consistent glucose monitoring and medication compliance.  -- advise daily foot checks  -- advise yearly ophthalmologic exams  -- advise adequate dietary and exercise modification  -- advise regular dental visits  -- advise daily low-dose aspirin use if patient is not on other anticoagulants and there are no other contraindications.  -- Medication management:  Continue metformin 1000 b.i.d., Januvia 100 mg daily.  Increase Lantus to 55 units daily then over the next 5-10 days up to 60 units daily if her sugars are still largely above 140 in the morning.  Advise healthy diet, initiation of regular exercise program.    Hyperlipidemia:  Recheck labs.  Continue Lipitor.  She has been off her try cor which I think is reasonable at this time.    Osteopenia with vitamin-D deficiency:  Recheck vitamin D.  DEXA up-to-date.  Advise weight-bearing exercise.  Discussed vitamin-D dosing of 000 units daily , potentially more if still showing signs of deficiency.    Asthma, still suboptimally controlled.  Increase her Flovent 220 mcg b.i.d..  Albuterol as needed for breakthrough    GERD:  Stable on daily Zantac    Atrial fibrillation:  Stable.  Continue Xarelto.  No bleeding issues    Return in 3 months      SCREENING  Colonoscopy 2017 with Dr. Ramirez.  Diverticulosis noted sigmoid colon and descending colon and ascending colon.  3 polyps, 10-18 mm, found and proximal descending colon and cecum, adenomas, recommended repeat colonoscopy in 3 years.     Mammogram:  07/17/2017, digital diagnostic bilateral, normal. Rep scheduled 9/7/18    DEXA 2018, osteopenia    Echo with Doppler 08/31/2017:  CONCLUSIONS     1 - No wall motion abnormalities.     2  - Normal left ventricular systolic function (EF 55-60%).     3 - Normal left ventricular diastolic function.     4 - Normal right ventricular systolic function .      Immunizations:  Adacel 9/4/15   Pneumovax 2010, booster 2019  Prevnar  05/17/2018

## 2018-08-10 NOTE — PATIENT INSTRUCTIONS
Recommendations for calcium and vitamin D intake    Calcium: 5794-0444 mg a day    Vit D: 800-1000 units a day

## 2018-08-16 NOTE — TELEPHONE ENCOUNTER
----- Message from James Betancur sent at 8/16/2018  1:09 PM CDT -----  Contact: [phone]  Patient would like refill of rivaroxaban (XARELTO) 20 mg Tab sent to [facility]/pharmacy #[id number] - [place]. Please advise.

## 2018-08-20 RX ORDER — METOPROLOL SUCCINATE 100 MG/1
100 TABLET, EXTENDED RELEASE ORAL DAILY
Qty: 90 TABLET | Refills: 3 | Status: SHIPPED | OUTPATIENT
Start: 2018-08-20 | End: 2019-07-01 | Stop reason: SDUPTHER

## 2018-09-07 ENCOUNTER — OFFICE VISIT (OUTPATIENT)
Dept: HEMATOLOGY/ONCOLOGY | Facility: CLINIC | Age: 63
End: 2018-09-07
Payer: MEDICARE

## 2018-09-07 ENCOUNTER — HOSPITAL ENCOUNTER (OUTPATIENT)
Dept: RADIOLOGY | Facility: HOSPITAL | Age: 63
Discharge: HOME | End: 2018-09-07
Attending: INTERNAL MEDICINE
Payer: MEDICARE

## 2018-09-07 VITALS
OXYGEN SATURATION: 97 % | RESPIRATION RATE: 18 BRPM | HEIGHT: 61 IN | BODY MASS INDEX: 34.8 KG/M2 | WEIGHT: 184.31 LBS | TEMPERATURE: 98 F | HEART RATE: 73 BPM | SYSTOLIC BLOOD PRESSURE: 124 MMHG | DIASTOLIC BLOOD PRESSURE: 60 MMHG

## 2018-09-07 DIAGNOSIS — D05.10 DUCTAL CARCINOMA IN SITU (DCIS) OF BREAST, UNSPECIFIED LATERALITY: Primary | ICD-10-CM

## 2018-09-07 DIAGNOSIS — D05.12 DUCTAL CARCINOMA IN SITU (DCIS) OF LEFT BREAST: ICD-10-CM

## 2018-09-07 PROCEDURE — 99214 OFFICE O/P EST MOD 30 MIN: CPT | Mod: PBBFAC | Performed by: INTERNAL MEDICINE

## 2018-09-07 PROCEDURE — 77066 DX MAMMO INCL CAD BI: CPT | Mod: TC,PO

## 2018-09-07 PROCEDURE — 99213 OFFICE O/P EST LOW 20 MIN: CPT | Mod: S$PBB,,, | Performed by: INTERNAL MEDICINE

## 2018-09-07 PROCEDURE — 77062 BREAST TOMOSYNTHESIS BI: CPT | Mod: 26,,, | Performed by: RADIOLOGY

## 2018-09-07 PROCEDURE — 99999 PR PBB SHADOW E&M-EST. PATIENT-LVL IV: CPT | Mod: PBBFAC,,, | Performed by: INTERNAL MEDICINE

## 2018-09-07 PROCEDURE — 77066 DX MAMMO INCL CAD BI: CPT | Mod: 26,,, | Performed by: RADIOLOGY

## 2018-09-07 NOTE — PROGRESS NOTES
Subjective:       Patient ID: Jeanine Chandler is a 63 y.o. female.    Chief Complaint: No chief complaint on file.    HPI Ms. Chandler is a 63-year-old white female who   returned to clinic for followup of ductal carcinoma in situ of the left      breast.  She had lumpectomy in 05/2005 and was treated on protocol NSABP     B-35 (Arimidex versus tamoxifen).  She completed that therapy in 09/2010.       She has had her blood pressure medicine and insulin adjusted by her primary care physician and reports she has been feeling better overall.  Her energy level has improved.  She is watching her grandson several days a week and is enjoying that.  She denies any breast complaints.    Review of Systems   Constitutional: Negative for activity change, appetite change and unexpected weight change.   Respiratory: Negative for cough and shortness of breath.    Cardiovascular: Negative for chest pain.   Gastrointestinal: Negative for abdominal pain, nausea and vomiting.   Musculoskeletal: Negative for back pain and neck pain.   Neurological: Negative for headaches.   Psychiatric/Behavioral: Negative for dysphoric mood. The patient is not nervous/anxious.        Objective:      Physical Exam   Constitutional: She appears well-developed and well-nourished.   Cardiovascular: Normal rate, regular rhythm and normal heart sounds.   Pulmonary/Chest: Effort normal and breath sounds normal. She has no wheezes. She has no rales. Right breast exhibits no mass, no nipple discharge and no skin change. Left breast exhibits no mass, no nipple discharge and no skin change.       Abdominal: Soft. She exhibits no mass. There is no tenderness.   Psychiatric: She has a normal mood and affect. Her behavior is normal. Thought content normal.       Assessment:     mammograms  -  No evidence of malignancy  1. Ductal carcinoma in situ (DCIS) of breast, unspecified laterality        Plan:       Return in one year for follow-up mammograms.           Distress Screening Results: Psychosocial Distress screening score of Distress Score: 0 noted and reviewed. No intervention indicated.

## 2018-09-10 RX ORDER — ERGOCALCIFEROL 1.25 MG/1
CAPSULE ORAL
Qty: 12 CAPSULE | Refills: 0 | OUTPATIENT
Start: 2018-09-10

## 2018-09-13 ENCOUNTER — TELEPHONE (OUTPATIENT)
Dept: FAMILY MEDICINE | Facility: CLINIC | Age: 63
End: 2018-09-13

## 2018-09-27 RX ORDER — ESCITALOPRAM OXALATE 20 MG/1
TABLET ORAL
Qty: 90 TABLET | Refills: 0 | Status: SHIPPED | OUTPATIENT
Start: 2018-09-27 | End: 2018-12-28 | Stop reason: SDUPTHER

## 2018-12-28 RX ORDER — ESCITALOPRAM OXALATE 20 MG/1
TABLET ORAL
Qty: 90 TABLET | Refills: 0 | Status: SHIPPED | OUTPATIENT
Start: 2018-12-28 | End: 2019-03-28 | Stop reason: SDUPTHER

## 2018-12-28 RX ORDER — SITAGLIPTIN 100 MG/1
TABLET, FILM COATED ORAL
Qty: 90 TABLET | Refills: 2 | Status: SHIPPED | OUTPATIENT
Start: 2018-12-28 | End: 2019-04-08

## 2019-03-01 RX ORDER — METFORMIN HYDROCHLORIDE 1000 MG/1
TABLET ORAL
Qty: 180 TABLET | Refills: 11 | Status: SHIPPED | OUTPATIENT
Start: 2019-03-01 | End: 2019-07-01 | Stop reason: SDUPTHER

## 2019-03-28 RX ORDER — ESCITALOPRAM OXALATE 20 MG/1
TABLET ORAL
Qty: 90 TABLET | Refills: 0 | Status: SHIPPED | OUTPATIENT
Start: 2019-03-28 | End: 2019-06-24 | Stop reason: SDUPTHER

## 2019-04-01 ENCOUNTER — OFFICE VISIT (OUTPATIENT)
Dept: ENDOCRINOLOGY | Facility: CLINIC | Age: 64
End: 2019-04-01
Payer: MEDICARE

## 2019-04-01 ENCOUNTER — OFFICE VISIT (OUTPATIENT)
Dept: FAMILY MEDICINE | Facility: CLINIC | Age: 64
End: 2019-04-01
Payer: MEDICARE

## 2019-04-01 VITALS
BODY MASS INDEX: 35.34 KG/M2 | HEIGHT: 61 IN | TEMPERATURE: 99 F | HEART RATE: 64 BPM | WEIGHT: 187.19 LBS | OXYGEN SATURATION: 98 % | DIASTOLIC BLOOD PRESSURE: 70 MMHG | SYSTOLIC BLOOD PRESSURE: 126 MMHG

## 2019-04-01 VITALS
RESPIRATION RATE: 18 BRPM | HEIGHT: 62 IN | BODY MASS INDEX: 34.29 KG/M2 | DIASTOLIC BLOOD PRESSURE: 64 MMHG | WEIGHT: 186.31 LBS | HEART RATE: 68 BPM | SYSTOLIC BLOOD PRESSURE: 100 MMHG

## 2019-04-01 DIAGNOSIS — R53.83 OTHER FATIGUE: ICD-10-CM

## 2019-04-01 DIAGNOSIS — I48.0 PAROXYSMAL ATRIAL FIBRILLATION: ICD-10-CM

## 2019-04-01 DIAGNOSIS — G89.29 CHRONIC PAIN OF LEFT KNEE: ICD-10-CM

## 2019-04-01 DIAGNOSIS — J45.20 MILD INTERMITTENT ASTHMA, UNSPECIFIED WHETHER COMPLICATED: ICD-10-CM

## 2019-04-01 DIAGNOSIS — E11.319 TYPE 2 DIABETES MELLITUS WITH BOTH EYES AFFECTED BY RETINOPATHY WITHOUT MACULAR EDEMA, WITH LONG-TERM CURRENT USE OF INSULIN, UNSPECIFIED RETINOPATHY SEVERITY: ICD-10-CM

## 2019-04-01 DIAGNOSIS — Z00.00 PREVENTATIVE HEALTH CARE: Primary | ICD-10-CM

## 2019-04-01 DIAGNOSIS — M25.562 CHRONIC PAIN OF LEFT KNEE: ICD-10-CM

## 2019-04-01 DIAGNOSIS — Z79.4 TYPE 2 DIABETES MELLITUS WITH RETINOPATHY OF BOTH EYES, WITH LONG-TERM CURRENT USE OF INSULIN, MACULAR EDEMA PRESENCE UNSPECIFIED, UNSPECIFIED RETINOPATHY SEVERITY: Primary | ICD-10-CM

## 2019-04-01 DIAGNOSIS — E11.319 TYPE 2 DIABETES MELLITUS WITH RETINOPATHY OF BOTH EYES, WITH LONG-TERM CURRENT USE OF INSULIN, MACULAR EDEMA PRESENCE UNSPECIFIED, UNSPECIFIED RETINOPATHY SEVERITY: Primary | ICD-10-CM

## 2019-04-01 DIAGNOSIS — I10 ESSENTIAL HYPERTENSION: ICD-10-CM

## 2019-04-01 DIAGNOSIS — E78.2 MIXED HYPERLIPIDEMIA: ICD-10-CM

## 2019-04-01 DIAGNOSIS — Z79.4 TYPE 2 DIABETES MELLITUS WITH BOTH EYES AFFECTED BY RETINOPATHY WITHOUT MACULAR EDEMA, WITH LONG-TERM CURRENT USE OF INSULIN, UNSPECIFIED RETINOPATHY SEVERITY: ICD-10-CM

## 2019-04-01 DIAGNOSIS — E78.5 HYPERLIPIDEMIA, UNSPECIFIED HYPERLIPIDEMIA TYPE: ICD-10-CM

## 2019-04-01 PROCEDURE — 99999 PR PBB SHADOW E&M-EST. PATIENT-LVL IV: ICD-10-PCS | Mod: PBBFAC,,, | Performed by: PHYSICIAN ASSISTANT

## 2019-04-01 PROCEDURE — 99204 OFFICE O/P NEW MOD 45 MIN: CPT | Mod: S$PBB,,, | Performed by: NURSE PRACTITIONER

## 2019-04-01 PROCEDURE — 99214 OFFICE O/P EST MOD 30 MIN: CPT | Mod: PBBFAC,27,PO | Performed by: PHYSICIAN ASSISTANT

## 2019-04-01 PROCEDURE — 99215 OFFICE O/P EST HI 40 MIN: CPT | Mod: PBBFAC,PO | Performed by: NURSE PRACTITIONER

## 2019-04-01 PROCEDURE — 99214 OFFICE O/P EST MOD 30 MIN: CPT | Mod: S$PBB,,, | Performed by: PHYSICIAN ASSISTANT

## 2019-04-01 PROCEDURE — 99999 PR PBB SHADOW E&M-EST. PATIENT-LVL IV: CPT | Mod: PBBFAC,,, | Performed by: PHYSICIAN ASSISTANT

## 2019-04-01 PROCEDURE — 99204 PR OFFICE/OUTPT VISIT, NEW, LEVL IV, 45-59 MIN: ICD-10-PCS | Mod: S$PBB,,, | Performed by: NURSE PRACTITIONER

## 2019-04-01 PROCEDURE — 99214 PR OFFICE/OUTPT VISIT, EST, LEVL IV, 30-39 MIN: ICD-10-PCS | Mod: S$PBB,,, | Performed by: PHYSICIAN ASSISTANT

## 2019-04-01 PROCEDURE — 99999 PR PBB SHADOW E&M-EST. PATIENT-LVL V: CPT | Mod: PBBFAC,,, | Performed by: NURSE PRACTITIONER

## 2019-04-01 PROCEDURE — 99999 PR PBB SHADOW E&M-EST. PATIENT-LVL V: ICD-10-PCS | Mod: PBBFAC,,, | Performed by: NURSE PRACTITIONER

## 2019-04-01 RX ORDER — LISINOPRIL AND HYDROCHLOROTHIAZIDE 12.5; 2 MG/1; MG/1
1 TABLET ORAL DAILY
COMMUNITY
End: 2019-06-24

## 2019-04-01 RX ORDER — CHOLECALCIFEROL (VITAMIN D3) 25 MCG
2000 TABLET ORAL DAILY
COMMUNITY

## 2019-04-01 RX ORDER — INSULIN GLARGINE 100 [IU]/ML
50 INJECTION, SOLUTION SUBCUTANEOUS NIGHTLY
Qty: 45 ML | Refills: 11
Start: 2019-04-01 | End: 2019-10-24

## 2019-04-01 RX ORDER — PEN NEEDLE, DIABETIC 30 GX3/16"
NEEDLE, DISPOSABLE MISCELLANEOUS
Qty: 100 EACH | Refills: 3 | Status: SHIPPED | OUTPATIENT
Start: 2019-04-01 | End: 2022-05-02 | Stop reason: SDUPTHER

## 2019-04-01 RX ORDER — MINERAL OIL
180 ENEMA (ML) RECTAL DAILY
Status: ON HOLD | COMMUNITY
End: 2022-10-23 | Stop reason: CLARIF

## 2019-04-01 NOTE — PATIENT INSTRUCTIONS
Upon review of labs, we will likely start Ozempic (once weekly injectable).   Stop Januvia  Start Ozempic 0.25 mg once every 7 days. After 4 weeks, increase dose to 0.5 mg every 7 days.

## 2019-04-01 NOTE — LETTER
April 1, 2019      Idania Junior PA-C  [address] [facility] [address] [place] [address]           [facility]  [address] [facility] [address] [place] [address]  Phone: [phone]  Fax: [phone]          Patient: Jeanine Chandler   MR Number: [id number]   YOB: 1955   Date of Visit: 4/1/2019       Dear Idania Junior:    Thank you for referring Jeanine Chandler to me for evaluation. Attached you will find relevant portions of my assessment and plan of care.    If you have questions, please do not hesitate to call me. I look forward to following Jeanine Chandler along with you.    Sincerely,    EMMANUEL Williamson,TIEN-C    Enclosure  CC:  No Recipients    If you would like to receive this communication electronically, please contact [email or web address] or [phone] to request more information on Epic[employer] Link access.    For providers and/or their staff who would like to refer a patient to [facility], please contact us through our one-stop-shop provider referral line, [facility], at [phone].    If you feel you have received this communication in error or would no longer like to receive these types of communications, please e-mail [email or web address]

## 2019-04-01 NOTE — PATIENT INSTRUCTIONS
Continue all current medication.    Bloodwork scheduled for tomorrow.  Will call with results.        Thanks for seeing me,  Idania Junior PA-C

## 2019-04-01 NOTE — PROGRESS NOTES
CC: Ms. Jeanine Chandler arrives today for management of Type 2 DM and review of chronic medical conditions, as listed in the Visit Diagnosis section of this encounter.       HPI: Ms. Jeanine Chandler was diagnosed with Type 2 DM in her late 40s. She was diagnosed based on lab work. Initial treatment consisted of metformin and insulin was added a few years later. + FH of DM in mother and sister. Denies hospitalizations due to DM.     This is her first time being seen in endocrine.     Last A1c was in May 2018 at 9.6%.    She recently moved to the [facility] 1 year ago and is establishing care here. Has appt with new Primary Care provider today, as well.     She has atrial fibrillation and takes metoprolol and Xarelto. She is compliant with taking lisinopil-HCTZ and atorvastatin.     Not checking BG readings. She states that she bleeds after testing, due to Xarelto.    She is taking OTC Vitamin D3 2000 iu daily.    Hypoglycemia: No  Hypoglycemic Symptoms: none  Hypoglycemia Treatment: n/a    Missing Insulin/PO medication doses: No  Timing prandial insulin 5-15 minutes before meals: n/a    Exercise: No but watches active 3 y/o grandson 2 days/week.     Dietary Habits: Eats 3 meals/day. Snacks after dinner on pretzels, cheese. Occasional diet soda or diet tea. .    Last DM education appointment: class on the [facility]. Class fell short of meeting pt's dietary questions.       CURRENT DIABETIC MEDS: metformin 1000 mg BID, Januvia 100 mg daily, Lantus 50 units QHS  Vial or pen: pen  Glucometer type: unsure    Previous DM treatments:    Last Eye Exam: January 2019. Pt reports retinopathy. Can't recall practice or provider's name (believes she saw a retina specialist)  Last Podiatry Exam: n/a    REVIEW OF SYSTEMS  Constitutional: no c/o weakness or weight loss. + fatigue   Eyes: denies visual disturbances.  ENT: denies dysphagia, hearing loss  Cardiac: no palpitations or chest pain.  Respiratory: no cough or  "dyspnea.  GI: no c/o abdominal pain or nausea. Denies h/o pancreatitis.  : denies urinary frequency, burning, discharge, frequent UTIs  Skin: no lesions or rashes. + rosacea  Musculoskeletal: denies difficulty mobilizing, denies muscle pains. Chronic L knee pain (past fractured patella)  Neuro: no numbness, tingling, or parasthesias.  Endocrine: denies polyphagia, polydipsia, polyuria      Personally reviewed Past Medical, Surgical, Social History.    Vital Signs  /64   Pulse 68   Resp 18   Ht 5' 1.5" (1.562 m)   Wt 84.5 kg (186 lb 4.6 oz)   BMI 34.63 kg/m²     Personally reviewed the below labs:    Hemoglobin A1C   Date Value Ref Range Status   09/22/2010 6.5 (H) 4.0 - 6.2 % Final   04/03/2009 7.2 (H) 4.0 - 6.2 % Final   04/22/2008 6.7 (H) 4.5 - 6.2 % Final       Chemistry        Component Value Date/Time     05/21/2018 1131    K 4.6 05/21/2018 1131     05/21/2018 1131    CO2 27 05/21/2018 1131    BUN 17 05/21/2018 1131    CREATININE 0.96 05/21/2018 1131     (H) 05/21/2018 1131        Component Value Date/Time    CALCIUM 9.4 05/21/2018 1131    ALKPHOS 75 05/21/2018 1131    AST 14 05/21/2018 1131    ALT 16 05/21/2018 1131    BILITOT 0.4 05/21/2018 1131    ESTGFRAFRICA 72 05/21/2018 1131    EGFRNONAA 62 05/21/2018 1131          Lab Results   Component Value Date    CHOL 195 05/21/2018    CHOL 138 09/22/2010    CHOL 240 (H) 04/03/2009     Lab Results   Component Value Date    HDL 32 (L) 05/21/2018    HDL 33 (L) 09/22/2010    HDL 33 (L) 04/03/2009     Lab Results   Component Value Date    LDLCALC 121 (H) 05/21/2018    LDLCALC 71.8 09/22/2010    LDLCALC 134.4 (H) 04/03/2009     Lab Results   Component Value Date    TRIG 271 (H) 05/21/2018    TRIG 166 (H) 09/22/2010    TRIG 363 (H) 04/03/2009     Lab Results   Component Value Date    CHOLHDL 6.1 (H) 05/21/2018    CHOLHDL 23.9 09/22/2010    CHOLHDL 13.8 (L) 04/03/2009       Lab Results   Component Value Date    MICALBCREAT 3 05/21/2018 "     Lab Results   Component Value Date    TSH 2.15 05/21/2018       CrCl cannot be calculated (Patient's most recent lab result is older than the maximum 7 days allowed.).    No results found for: [id number]      PHYSICAL EXAMINATION  Constitutional: Appears well, no distress  Neck: Supple, trachea midline; no thyromegaly or nodules.   Respiratory: CTA, even and unlabored.  Cardiovascular: RRR, no murmurs, no carotid bruits. DP pulses  2+ bilaterally; no edema.    GI: bowel sounds active, no hernia noted  Lymph: no cervical or supraclavicular lymphadenopathy  Skin: warm and dry; no lipohypertrophy, or acanthosis nigracans observed.  Psych: normal affect, pleasant mood, conversant  Musculoskeletal: steady gait, no clubbing, full ROM to all extremities  Neuro: no loss of protective sensation via 10 gm monofilament or vibratory exam bilaterally, DTR 2+ BUE/2+RLE, 1+LLE.  Feet: no open wounds or callouses; appropriate footwear.    A1c target < 7%      Assessment/Plan  1. Type 2 diabetes mellitus with retinopathy of both eyes, with long-term current use of insulin, macular edema presence unspecified, unspecified retinopathy severity  -- Previously uncontrolled. Unable to make medication adjustments today without pt testing glucose/no A1c.  -- A1c, CMP, lipid, Vit D, urine mcr tomorrow at [facility].   -- After reviewing labs, discussed possibly changing out Januvia for Ozempic. Will start at 0.25 mg dose weekly for 4 weeks, then increase to 0.5 mg weekly. Discussed medication's mechanism of action, side effects, and contraindications. Advised pt to notify me for extreme nausea, abdominal pain, etc.  -- will DC Januvia if starting Ozempic  -- continue metformin and Lantus at current doses for now.   -- check BG 2x/day and bring log to next FU. Notify me if BG < 80 or > 150 after impending med change.   Ambulatory Referral to Diabetes Education for diet.     -- Discussed diagnosis of DM, A1c goals, progression of disease,  long term complications and tx options.    -- Reviewed hypoglycemia management: treat with 1/2 glass of juice, 1/2 can regular coke, or 4 glucose tablets. Monitor and repeat treatment every 15 minutes until BG is >70 Then have a snack, which includes a complex carbohydrate and protein.   Advised patient to check BG before activities, such as driving or exercise.    -- takes statin, ACE-I, Xarelto     2. Essential hypertension  -- controlled  -- continue current meds   3. Hyperlipidemia, unspecified hyperlipidemia type  -- uncontrolled  -- pt has since resumed atorvastatin  -- lipid panel tomorrow   4. Atrial fibrillation -- may increase insulin resistance  -- follows w cardiology       FOLLOW UP  Follow up in about 3 months (around 7/1/2019).   Patient instructed to bring BG logs to each follow up   Patient encouraged to call for any BG/medication issues, concerns, or questions.    Orders Placed This Encounter   Procedures    Hemoglobin A1c    Comprehensive metabolic panel    Lipid panel    Vitamin D    Microalbumin/creatinine urine ratio    Hemoglobin A1c    Ambulatory Referral to Diabetes Education     DIABETES FOOT EXAM

## 2019-04-01 NOTE — PROGRESS NOTES
"Subjective:      Patient ID: Jeanine Chandler is a 63 y.o. female.    Chief Complaint: Hypertension  Patient is new to me and clinic.    HPI   Patient says she has no energy and always tired.  Patient does snore and takes naps throughout day, but currently no one is sleeping in her same room.    Patient does not take blood sugars regularly.  On Xarelto currently, so can't get the bleeding to stop when taking it.    Lab Results   Component Value Date    LABA1C 9.6 (H) 05/21/2018    HGBA1C 9.4 (H) 04/03/2019     Chronic left knee pain from ORIF of patella fracture many years ago.  Not interested in PT at this time.    Patient had eye exam in January.  Patient will try to get those records for us.    Patient uses albuterol inhaler less than once a week.  Using Flovent daily.    Review of Systems   Constitutional: Negative for appetite change, chills and fever.   HENT: Positive for sinus pressure and sinus pain. Negative for ear pain and sore throat.    Eyes: Negative for pain and discharge.   Respiratory: Positive for cough (dry). Negative for shortness of breath.    Cardiovascular: Negative for chest pain and leg swelling.   Gastrointestinal: Negative for abdominal pain, blood in stool, constipation, diarrhea, nausea and vomiting.   Endocrine: Negative for polydipsia and polyuria.   Genitourinary: Negative for dysuria and hematuria.   Musculoskeletal: Negative for myalgias.   Skin: Negative for rash.   Allergic/Immunologic: Positive for environmental allergies.   Neurological: Negative for dizziness, light-headedness and headaches.   Hematological: Bruises/bleeds easily.   Psychiatric/Behavioral: Negative for suicidal ideas. The patient is not nervous/anxious.        Objective:   /70 (BP Location: Left arm)   Pulse 64   Temp 99 °F (37.2 °C)   Ht 5' 1" (1.549 m)   Wt 84.9 kg (187 lb 2.7 oz)   SpO2 98%   BMI 35.37 kg/m²      Physical Exam   Constitutional: She is oriented to person, place, and time. She " appears well-developed and well-nourished. She is active and cooperative. No distress.   HENT:   Head: Normocephalic and atraumatic.   Right Ear: Hearing, tympanic membrane, external ear and ear canal normal.   Left Ear: Hearing, tympanic membrane, external ear and ear canal normal.   Nose: Nose normal. Right sinus exhibits no maxillary sinus tenderness and no frontal sinus tenderness. Left sinus exhibits no maxillary sinus tenderness and no frontal sinus tenderness.   Mouth/Throat: Uvula is midline and mucous membranes are normal. Posterior oropharyngeal erythema (slight suggestive of post nasal drip) present. No tonsillar exudate.   Eyes: Conjunctivae and lids are normal.   Neck: Normal range of motion and phonation normal. Neck supple.   Cardiovascular: Normal rate, regular rhythm and normal heart sounds. Exam reveals no gallop and no friction rub.   No murmur heard.  Pulmonary/Chest: Effort normal and breath sounds normal. No stridor. No respiratory distress. She has no decreased breath sounds. She has no wheezes. She has no rhonchi. She has no rales.   Abdominal: Soft. Bowel sounds are normal. There is no tenderness.   Musculoskeletal: Normal range of motion.   Lymphadenopathy:        Head (right side): No submental, no submandibular, no tonsillar, no preauricular, no posterior auricular and no occipital adenopathy present.        Head (left side): No submental, no submandibular, no tonsillar, no preauricular, no posterior auricular and no occipital adenopathy present.     She has no cervical adenopathy.   Neurological: She is alert and oriented to person, place, and time.   Skin: Skin is warm, dry and intact. No rash noted.   Psychiatric: She has a normal mood and affect. Her speech is normal and behavior is normal. Judgment and thought content normal. Cognition and memory are normal.   Nursing note and vitals reviewed.     StopBang Score 4  Assessment:      1. Preventative health care    2. Other fatigue     3. Type 2 diabetes mellitus with both eyes affected by retinopathy without macular edema, with long-term current use of insulin, unspecified retinopathy severity    4. Mild intermittent asthma, unspecified whether complicated    5. Essential hypertension    6. Mixed hyperlipidemia    7. Chronic pain of left knee    8. BMI 35.0-35.9,adult       Plan:   1. Preventative health care  Bloodwork scheduled for tomorrow.  Will call with results.  - CBC auto differential; Future  - CBC auto differential  - TSH; Future  - TSH    2. Other fatigue  - CBC auto differential; Future  - CBC auto differential    3. Type 2 diabetes mellitus with both eyes affected by retinopathy without macular edema, with long-term current use of insulin, unspecified retinopathy severity  Continue current medication.  Continue to limit carbohydrates and sugar.    4. Mild intermittent asthma, unspecified whether complicated  Continue current medication.    5. Essential hypertension  Continue current medication.    6. Mixed hyperlipidemia  Continue current medication.    7. Chronic pain of left knee  Continued.  Patient does not desire PT at this time.    8. BMI 35.0-35.9,adult  Discussed diet and lifestyle modifications.    Follow up with new PCP in 3 months.  Patient agreed with plan and expressed understanding.

## 2019-04-04 LAB
25(OH)D3 SERPL-MCNC: 53 NG/ML (ref 30–100)
ALBUMIN SERPL-MCNC: 4.2 G/DL (ref 3.6–5.1)
ALBUMIN/GLOB SERPL: 1.4 (CALC) (ref 1–2.5)
ALP SERPL-CCNC: 79 U/L (ref 33–130)
ALT SERPL-CCNC: 13 U/L (ref 6–29)
AST SERPL-CCNC: 13 U/L (ref 10–35)
BASOPHILS # BLD AUTO: 50 CELLS/UL (ref 0–200)
BASOPHILS NFR BLD AUTO: 0.7 %
BILIRUB SERPL-MCNC: 0.6 MG/DL (ref 0.2–1.2)
BUN SERPL-MCNC: 13 MG/DL (ref 7–25)
BUN/CREAT SERPL: ABNORMAL (CALC) (ref 6–22)
CALCIUM SERPL-MCNC: 8.9 MG/DL (ref 8.6–10.4)
CHLORIDE SERPL-SCNC: 102 MMOL/L (ref 98–110)
CHOLEST SERPL-MCNC: 151 MG/DL
CHOLEST/HDLC SERPL: 3.5 (CALC)
CO2 SERPL-SCNC: 25 MMOL/L (ref 20–32)
CREAT SERPL-MCNC: 0.84 MG/DL (ref 0.5–0.99)
EOSINOPHIL # BLD AUTO: 209 CELLS/UL (ref 15–500)
EOSINOPHIL NFR BLD AUTO: 2.9 %
ERYTHROCYTE [DISTWIDTH] IN BLOOD BY AUTOMATED COUNT: 13.4 % (ref 11–15)
GFRSERPLBLD MDRD-ARVRAT: 73 ML/MIN/1.73M2
GLOBULIN SER CALC-MCNC: 3 G/DL (CALC) (ref 1.9–3.7)
GLUCOSE SERPL-MCNC: 219 MG/DL (ref 65–99)
HBA1C MFR BLD: 9.4 % OF TOTAL HGB
HCT VFR BLD AUTO: 40.5 % (ref 35–45)
HDLC SERPL-MCNC: 43 MG/DL
HGB BLD-MCNC: 13.2 G/DL (ref 11.7–15.5)
LDLC SERPL CALC-MCNC: 75 MG/DL (CALC)
LYMPHOCYTES # BLD AUTO: 2398 CELLS/UL (ref 850–3900)
LYMPHOCYTES NFR BLD AUTO: 33.3 %
MCH RBC QN AUTO: 27.6 PG (ref 27–33)
MCHC RBC AUTO-ENTMCNC: 32.6 G/DL (ref 32–36)
MCV RBC AUTO: 84.6 FL (ref 80–100)
MONOCYTES # BLD AUTO: 396 CELLS/UL (ref 200–950)
MONOCYTES NFR BLD AUTO: 5.5 %
NEUTROPHILS # BLD AUTO: 4147 CELLS/UL (ref 1500–7800)
NEUTROPHILS NFR BLD AUTO: 57.6 %
NONHDLC SERPL-MCNC: 108 MG/DL (CALC)
PLATELET # BLD AUTO: 284 THOUSAND/UL (ref 140–400)
PMV BLD REES-ECKER: 11.3 FL (ref 7.5–12.5)
POTASSIUM SERPL-SCNC: 4.1 MMOL/L (ref 3.5–5.3)
PROT SERPL-MCNC: 7.2 G/DL (ref 6.1–8.1)
RBC # BLD AUTO: 4.79 MILLION/UL (ref 3.8–5.1)
SODIUM SERPL-SCNC: 138 MMOL/L (ref 135–146)
TRIGL SERPL-MCNC: 248 MG/DL
TSH SERPL-ACNC: 2.4 MIU/L (ref 0.4–4.5)
WBC # BLD AUTO: 7.2 THOUSAND/UL (ref 3.8–10.8)

## 2019-04-05 ENCOUNTER — TELEPHONE (OUTPATIENT)
Dept: FAMILY MEDICINE | Facility: CLINIC | Age: 64
End: 2019-04-05

## 2019-04-06 LAB
ALBUMIN/CREAT UR: 11 MCG/MG CREAT
CREAT UR-MCNC: 121 MG/DL (ref 20–275)
MICROALBUMIN UR-MCNC: 1.3 MG/DL

## 2019-04-08 ENCOUNTER — TELEPHONE (OUTPATIENT)
Dept: ENDOCRINOLOGY | Facility: CLINIC | Age: 64
End: 2019-04-08

## 2019-04-08 DIAGNOSIS — Z79.4 TYPE 2 DIABETES MELLITUS WITH BOTH EYES AFFECTED BY RETINOPATHY WITHOUT MACULAR EDEMA, WITH LONG-TERM CURRENT USE OF INSULIN, UNSPECIFIED RETINOPATHY SEVERITY: Primary | ICD-10-CM

## 2019-04-08 DIAGNOSIS — E11.319 TYPE 2 DIABETES MELLITUS WITH RETINOPATHY OF BOTH EYES, WITH LONG-TERM CURRENT USE OF INSULIN, MACULAR EDEMA PRESENCE UNSPECIFIED, UNSPECIFIED RETINOPATHY SEVERITY: ICD-10-CM

## 2019-04-08 DIAGNOSIS — Z79.4 TYPE 2 DIABETES MELLITUS WITH RETINOPATHY OF BOTH EYES, WITH LONG-TERM CURRENT USE OF INSULIN, MACULAR EDEMA PRESENCE UNSPECIFIED, UNSPECIFIED RETINOPATHY SEVERITY: ICD-10-CM

## 2019-04-08 DIAGNOSIS — E11.319 TYPE 2 DIABETES MELLITUS WITH BOTH EYES AFFECTED BY RETINOPATHY WITHOUT MACULAR EDEMA, WITH LONG-TERM CURRENT USE OF INSULIN, UNSPECIFIED RETINOPATHY SEVERITY: Primary | ICD-10-CM

## 2019-04-08 NOTE — TELEPHONE ENCOUNTER
Please call pt and notify her that her A1c is elevated at 9.4%. Triglycerides are elevated, which is likely related to her high sugars. Vitamin D level and urine test are normal.     I have sent in prescription for Ozempic to her pharmacy. Stop Januvia. Continue metformin and Lantus.     With Ozempic, will use 0.25 mg dose weekly for 4 weeks, then increase to 0.5 mg weekly. Can look on website for coupon card. After being on the 0.5 mg dose for a few weeks, she should notify me if majority of sugars remain > 150.

## 2019-04-09 ENCOUNTER — TELEPHONE (OUTPATIENT)
Dept: ENDOCRINOLOGY | Facility: CLINIC | Age: 64
End: 2019-04-09

## 2019-04-09 ENCOUNTER — CLINICAL SUPPORT (OUTPATIENT)
Dept: DIABETES | Facility: CLINIC | Age: 64
End: 2019-04-09
Payer: MEDICARE

## 2019-04-09 VITALS — BODY MASS INDEX: 33.98 KG/M2 | HEIGHT: 62 IN | WEIGHT: 184.63 LBS

## 2019-04-09 DIAGNOSIS — Z79.4 TYPE 2 DIABETES MELLITUS WITH BOTH EYES AFFECTED BY RETINOPATHY WITHOUT MACULAR EDEMA, WITH LONG-TERM CURRENT USE OF INSULIN, UNSPECIFIED RETINOPATHY SEVERITY: Primary | ICD-10-CM

## 2019-04-09 DIAGNOSIS — E11.319 TYPE 2 DIABETES MELLITUS WITH BOTH EYES AFFECTED BY RETINOPATHY WITHOUT MACULAR EDEMA, WITH LONG-TERM CURRENT USE OF INSULIN, UNSPECIFIED RETINOPATHY SEVERITY: Primary | ICD-10-CM

## 2019-04-09 PROCEDURE — G0108 DIAB MANAGE TRN  PER INDIV: HCPCS | Mod: PBBFAC,PO | Performed by: DIETITIAN, REGISTERED

## 2019-04-09 PROCEDURE — 99999 PR PBB SHADOW E&M-EST. PATIENT-LVL II: CPT | Mod: PBBFAC,,,

## 2019-04-09 PROCEDURE — 99212 OFFICE O/P EST SF 10 MIN: CPT | Mod: PBBFAC,PO

## 2019-04-09 PROCEDURE — 99999 PR PBB SHADOW E&M-EST. PATIENT-LVL II: ICD-10-PCS | Mod: PBBFAC,,,

## 2019-04-09 NOTE — PROGRESS NOTES
Diabetes Education  Author: Gloria Perez RD  Date: 4/9/2019    Diabetes Care Management Summary  Diabetes Education Record Assessment/Progress: Initial  Current Diabetes Risk Level: Moderate     Last A1c:   Lab Results   Component Value Date    HGBA1C 9.4 (H) 04/03/2019     Last visit with Diabetes Educator: : 04/09/2019    Diabetes Type  Diabetes Type : Type II    Diabetes History  Diabetes Diagnosis: >10 years  Current Treatment: Oral Medication, Injectable(metformin 1000 mg BID, Lantus 50 units QHS, Ozempic 0.25 mg weekly titrating to 0.5 mg dose after 4 weeks (prescribed 4/1 by endocrinology, patient has Rx but has not started)  Reviewed Problem List with Patient: Yes    Health Maintenance was reviewed today with patient. Discussed with patient importance of routine eye exams, foot exams/foot care, blood work (i.e.: A1c, microalbumin, and lipid), dental visits, yearly flu vaccine, and pneumonia vaccine as indicated by PCP. Patient verbalized understanding.     Health Maintenance Topics with due status: Not Due       Topic Last Completion Date    TETANUS VACCINE 09/04/2015    Colonoscopy 08/11/2017    DEXA SCAN 05/21/2018    Mammogram 09/07/2018    Low Dose Statin 04/01/2019    Foot Exam 04/01/2019    Lipid Panel 04/03/2019    Hemoglobin A1c 04/03/2019     Health Maintenance Due   Topic Date Due    Zoster Vaccine  04/27/2013    Pneumococcal Vaccine (65+ High/Highest Risk) (2 of 2 - PPSV23) 07/12/2018    Eye Exam  01/01/2019     Nutrition  Meal Planning: water, 3 meals per day, diet drinks, snacks between meal, eats out often  What type of beverages do you drink?: diet soda/tea, water  Meal Plan 24 Hour Recall - Breakfast: Hayes's egg, cheese biscuit, water  Meal Plan 24 Hour Recall - Lunch: flour tortilla, cheese, decaf [place] zero tea  Meal Plan 24 Hour Recall - Dinner: pork, cabbage, potatoes, decaf zero tea  Meal Plan 24 Hour Recall - Snack: apple, sugar free jello, sugar free pudding    Monitoring    Monitoring: Other(States she plans to purchase [employer] brand glucometer at pharmacy, has not checked glucose since last summer and Freestyle CGMS cost prohibitive per patient).  Patient also states she stopped checking glucose at home due to being on Xarelto (blood thinner.  Discussed that home SMBG not contraindicated with Xarelto, discussed lancing devices and how to manage depth of which lancet is sticking patient to avoid sticking finger too deep.      Self Monitoring : Not currently checking--reminded that when she gets a glucometer to check 2 times daily  Blood Glucose Logs: No  Do you use a personal continuous glucose monitor?: No(Reports wanted Freestyle Krupa but pharmacy cost with insurance and with cash card in excess of $400 for sensors per patient)    Exercise   Exercise Type: none(Denies limitations to walking--does state some instability when walking)    Social History  Preferred Learning Method: Face to Face  Primary Support: Self  Smoking Status: Never a Smoker  Alcohol Use: Never    Barriers to Change  Barriers to Change: None  Learning Challenges : None    Readiness to Learn   Readiness to Learn : Acceptance    Cultural Influences  Cultural Influences: None    Diabetes Education Assessment/Progress  Diabetes Disease Process (diabetes disease process and treatment options): Discussion, Comprehends Key Points.  Recent increase in Hgb D6o--[facility] from [facility] to [facility] region, has previously been managed by PCP, now established with Endocrinology.      Nutrition (Incorporating nutritional management into one's lifestyle): Discussion, Demonstration, Return Demonstration, Demonstrates Understanding/Competency (verbalizes/demonstrates), Written Materials Provided.  Reviewed food sources of CHO.  Practiced reading food labels for CHO content--patient provided with a CHO budget for meals and snacks .  Practiced meal planning with foods typically eaten.  Demonstrated use of telephone apps for her  Android phone (MyFitness Pal, Lose It!).  Discussed importance of adding non starchy vegetables at lunch and dinner.  Discussed better late night snack options that are low/no CHO.      Physical Activity (incorporating physical activity into one's lifestyle): Discussion, Instructed, Comprehends Key Points.  Denies any physical limitations with exercise other than that she at times feels unsteady.  Suggested joining a gym for use of stationary equipment but patient concerned about cost.  Feels if she can get her  to walk with her, she could start exercising.  Goal for aerobic exercise 30 minutes at least 3 days a week.      Medications (states correct name, dose, onset, peak, duration, side effects & timing of meds): Discussion, Comprehends Key Points.  Will be starting Ozempic.  Clarified to stop Januvia but to continue metformin and Lantus as prescribed.  Patient verbalizes understanding.      Monitoring (monitoring blood glucose/other parameters & using results): Discussion, Instructed, Comprehends Key Points, Written Materials Provided.  Patient not checking glucose--thought she could not since she was on a blood thinner medication.  Long discussion that home SMBG not contraindicated with blood thinner.  Patient states she will buy [employer] glucometer and start checking.  Instructed to check 2 times daily alternating times--before meals/at bedtime and to drop off glucose logs to [facility] in 3 weeks for review.      Acute Complications (preventing, detecting, and treating acute complications): Discussion    Chronic Complications (preventing, detecting, and treating chronic complications): Discussion.      Clinical (diabetes, other pertinent medical history, and relevant comorbidities reviewed during visit): Discussion.  History of hyperlipidemia and hypertension    Behavioral (readiness for change, lifestyle practices, self-care behaviors): Discussion.  Will start checking food labels for CHO  content on staple foods at home.  Will consider walking with  for exercise.    Goals  Patient has selected/evaluated goals during today's session: Yes, selected  Healthy Eating: Set(Check food labels for CHO content on foods--CHO budget for meals and snacks provided.)  Start Date: 04/09/19  Target Date: 07/09/19  Physical Activity: Set(Start walking with  to goal of 15-30 minutes 3 days a week)  Start Date: 04/09/19  Target Date: 07/09/19  Monitoring: Set(Check glucose 2 times daily--record and drop off glucose logs in 3 weeks to clinic for review)  Start Date: 04/09/19  Target Date: 07/09/19    Diabetes Care Plan/Intervention  Education Plan/Intervention:   1.  Strongly encouraged to obtain glucometer today and start checking glucose readings--check 2 times daily checking before meals or at bedtime.  Asked to drop off glucose log to [facility] in 3 weeks for review.  Patient wanted a personal Freestyle Krupa to prevent pricking her finger but she does not qualify with Medicare nor does she want to pay the cash card price.    2.  Initiate aerobic exercise--goal for 15-30 minutes at least 3 days a week.    3.  Balanced meals--patient educated on plate method.  Asked to incorporate more non starchy vegetables at lunch and dinner.      Diabetes Meal Plan  Calories: 1600  Carbohydrate Per Meal: 30-45g  Carbohydrate Per Snack : 7-15g    Today's Self-Management Care Plan was developed with the patient's input and is based on barriers identified during today's assessment.    The long and short-term goals in the care plan were written with the patient/caregiver's input. The patient has agreed to work toward these goals to improve her overall diabetes control.      The patient received a copy of today's self-management plan and verbalized understanding of the care plan, goals, and all of today's instructions.      The patient was encouraged to communicate with her physician and care team regarding her  condition(s) and treatment.  I provided the patient with my contact information today and encouraged her to contact me via phone or patient portal as needed.     Education Units of Time   Time Spent: 60 min

## 2019-04-09 NOTE — TELEPHONE ENCOUNTER
Please notify patient that her  requested that we send in a prescription for the Freestyle Krupa on her behalf. This is why a rx was sent to her pharmacy.     Since she has Medicare, she doesn't meet criteria. Can use [employer] coupon card as an option.     Without checking glucoses, my ability to adjust her meds further will be very limited.     As for the Ozempic, I do not recommend starting out with a 90 day supply in the event that she doesn't tolerate it. Then she would be stuck with 90 days of medicine she can't use. We can always resend for a 90 day supply if she continues on the medicine.

## 2019-04-09 NOTE — TELEPHONE ENCOUNTER
Spoke with pt, advised  requested the rx be sent to the pharmacy on her behalf for the yessenia so that is why it was sent in. Advised due to no medicare rx coverage, a coupon card was given to spouse and advised only can be used at [facility], Educated on why a 6 week supply of Ozempic was sent vs a 90 day suppy, advised did not want her stuck with medication that she could not use if her body could not handle it. 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 States that it cost 40.00 for a 6 week suplpy of Ozempic but 90 days only cost 8.00. Advised once she completed her 6 week trial and can tolerate it we can send it in for 90 days, pt began yelling and ended call.

## 2019-05-07 RX ORDER — ATORVASTATIN CALCIUM 20 MG/1
TABLET, FILM COATED ORAL
Qty: 90 TABLET | Refills: 3 | Status: SHIPPED | OUTPATIENT
Start: 2019-05-07 | End: 2019-07-01 | Stop reason: SDUPTHER

## 2019-06-20 ENCOUNTER — PATIENT OUTREACH (OUTPATIENT)
Dept: ADMINISTRATIVE | Facility: HOSPITAL | Age: 64
End: 2019-06-20

## 2019-06-24 RX ORDER — ESCITALOPRAM OXALATE 20 MG/1
TABLET ORAL
Qty: 90 TABLET | Refills: 0 | Status: SHIPPED | OUTPATIENT
Start: 2019-06-24 | End: 2019-07-01 | Stop reason: SDUPTHER

## 2019-06-24 RX ORDER — LISINOPRIL AND HYDROCHLOROTHIAZIDE 12.5; 2 MG/1; MG/1
TABLET ORAL
Qty: 180 TABLET | Refills: 3 | Status: SHIPPED | OUTPATIENT
Start: 2019-06-24 | End: 2019-07-18

## 2019-07-01 ENCOUNTER — OFFICE VISIT (OUTPATIENT)
Dept: ENDOCRINOLOGY | Facility: CLINIC | Age: 64
End: 2019-07-01
Payer: MEDICARE

## 2019-07-01 ENCOUNTER — OFFICE VISIT (OUTPATIENT)
Dept: FAMILY MEDICINE | Facility: CLINIC | Age: 64
End: 2019-07-01
Payer: MEDICARE

## 2019-07-01 VITALS
DIASTOLIC BLOOD PRESSURE: 78 MMHG | BODY MASS INDEX: 33.18 KG/M2 | HEIGHT: 62 IN | HEART RATE: 73 BPM | WEIGHT: 180.31 LBS | SYSTOLIC BLOOD PRESSURE: 112 MMHG

## 2019-07-01 VITALS
BODY MASS INDEX: 33.18 KG/M2 | SYSTOLIC BLOOD PRESSURE: 128 MMHG | WEIGHT: 180.31 LBS | HEART RATE: 64 BPM | DIASTOLIC BLOOD PRESSURE: 76 MMHG | HEIGHT: 62 IN

## 2019-07-01 DIAGNOSIS — I10 ESSENTIAL HYPERTENSION: ICD-10-CM

## 2019-07-01 DIAGNOSIS — I48.0 PAROXYSMAL ATRIAL FIBRILLATION: ICD-10-CM

## 2019-07-01 DIAGNOSIS — K21.9 GASTROESOPHAGEAL REFLUX DISEASE WITHOUT ESOPHAGITIS: ICD-10-CM

## 2019-07-01 DIAGNOSIS — E78.2 MIXED HYPERLIPIDEMIA: ICD-10-CM

## 2019-07-01 DIAGNOSIS — F41.9 ANXIETY: ICD-10-CM

## 2019-07-01 DIAGNOSIS — Z23 NEED FOR VACCINATION: ICD-10-CM

## 2019-07-01 DIAGNOSIS — Z79.4 TYPE 2 DIABETES MELLITUS WITH BOTH EYES AFFECTED BY RETINOPATHY WITHOUT MACULAR EDEMA, WITH LONG-TERM CURRENT USE OF INSULIN, UNSPECIFIED RETINOPATHY SEVERITY: Primary | ICD-10-CM

## 2019-07-01 DIAGNOSIS — E11.319 TYPE 2 DIABETES MELLITUS WITH BOTH EYES AFFECTED BY RETINOPATHY WITHOUT MACULAR EDEMA, WITH LONG-TERM CURRENT USE OF INSULIN, UNSPECIFIED RETINOPATHY SEVERITY: Primary | ICD-10-CM

## 2019-07-01 DIAGNOSIS — J45.20 MILD INTERMITTENT ASTHMA, UNSPECIFIED WHETHER COMPLICATED: ICD-10-CM

## 2019-07-01 DIAGNOSIS — E55.9 VITAMIN D DEFICIENCY DISEASE: ICD-10-CM

## 2019-07-01 PROCEDURE — 99214 PR OFFICE/OUTPT VISIT, EST, LEVL IV, 30-39 MIN: ICD-10-PCS | Mod: S$PBB,,, | Performed by: NURSE PRACTITIONER

## 2019-07-01 PROCEDURE — 99999 PR PBB SHADOW E&M-EST. PATIENT-LVL III: ICD-10-PCS | Mod: PBBFAC,,, | Performed by: FAMILY MEDICINE

## 2019-07-01 PROCEDURE — 95251 PR GLUCOSE MONITOR, 72 HOUR, PHYS INTERP: ICD-10-PCS | Mod: ,,, | Performed by: NURSE PRACTITIONER

## 2019-07-01 PROCEDURE — 95251 CONT GLUC MNTR ANALYSIS I&R: CPT | Mod: ,,, | Performed by: NURSE PRACTITIONER

## 2019-07-01 PROCEDURE — 99215 OFFICE O/P EST HI 40 MIN: CPT | Mod: PBBFAC,PO | Performed by: NURSE PRACTITIONER

## 2019-07-01 PROCEDURE — 99214 OFFICE O/P EST MOD 30 MIN: CPT | Mod: S$PBB,,, | Performed by: FAMILY MEDICINE

## 2019-07-01 PROCEDURE — 99999 PR PBB SHADOW E&M-EST. PATIENT-LVL V: CPT | Mod: PBBFAC,,, | Performed by: NURSE PRACTITIONER

## 2019-07-01 PROCEDURE — 99999 PR PBB SHADOW E&M-EST. PATIENT-LVL V: ICD-10-PCS | Mod: PBBFAC,,, | Performed by: NURSE PRACTITIONER

## 2019-07-01 PROCEDURE — 99214 PR OFFICE/OUTPT VISIT, EST, LEVL IV, 30-39 MIN: ICD-10-PCS | Mod: S$PBB,,, | Performed by: FAMILY MEDICINE

## 2019-07-01 PROCEDURE — 99214 OFFICE O/P EST MOD 30 MIN: CPT | Mod: S$PBB,,, | Performed by: NURSE PRACTITIONER

## 2019-07-01 PROCEDURE — G0009 ADMIN PNEUMOCOCCAL VACCINE: HCPCS | Mod: PBBFAC,PO

## 2019-07-01 PROCEDURE — 99999 PR PBB SHADOW E&M-EST. PATIENT-LVL III: CPT | Mod: PBBFAC,,, | Performed by: FAMILY MEDICINE

## 2019-07-01 PROCEDURE — 99213 OFFICE O/P EST LOW 20 MIN: CPT | Mod: PBBFAC,27,PO,25 | Performed by: FAMILY MEDICINE

## 2019-07-01 RX ORDER — LISINOPRIL AND HYDROCHLOROTHIAZIDE 12.5; 2 MG/1; MG/1
2 TABLET ORAL DAILY
Qty: 180 TABLET | Refills: 3 | Status: CANCELLED | OUTPATIENT
Start: 2019-07-01

## 2019-07-01 RX ORDER — ESCITALOPRAM OXALATE 20 MG/1
20 TABLET ORAL DAILY
Qty: 90 TABLET | Refills: 3 | Status: SHIPPED | OUTPATIENT
Start: 2019-07-01 | End: 2020-09-21

## 2019-07-01 RX ORDER — FLUTICASONE PROPIONATE 220 UG/1
1 AEROSOL, METERED RESPIRATORY (INHALATION) 2 TIMES DAILY
Qty: 36 G | Refills: 11 | Status: SHIPPED | OUTPATIENT
Start: 2019-07-01

## 2019-07-01 RX ORDER — ALBUTEROL SULFATE 90 UG/1
2 AEROSOL, METERED RESPIRATORY (INHALATION) EVERY 4 HOURS PRN
Qty: 3 INHALER | Refills: 3 | Status: SHIPPED | OUTPATIENT
Start: 2019-07-01 | End: 2022-10-10

## 2019-07-01 RX ORDER — METFORMIN HYDROCHLORIDE 1000 MG/1
1000 TABLET ORAL 2 TIMES DAILY WITH MEALS
Qty: 180 TABLET | Refills: 3 | Status: SHIPPED | OUTPATIENT
Start: 2019-07-01 | End: 2020-07-21 | Stop reason: SDUPTHER

## 2019-07-01 RX ORDER — METOPROLOL SUCCINATE 100 MG/1
100 TABLET, EXTENDED RELEASE ORAL DAILY
Qty: 90 TABLET | Refills: 3 | Status: SHIPPED | OUTPATIENT
Start: 2019-07-01 | End: 2020-08-16

## 2019-07-01 RX ORDER — LOSARTAN POTASSIUM AND HYDROCHLOROTHIAZIDE 12.5; 5 MG/1; MG/1
1 TABLET ORAL DAILY
Qty: 90 TABLET | Refills: 3 | Status: SHIPPED | OUTPATIENT
Start: 2019-07-01 | End: 2020-06-15

## 2019-07-01 RX ORDER — ATORVASTATIN CALCIUM 20 MG/1
20 TABLET, FILM COATED ORAL DAILY
Qty: 90 TABLET | Refills: 3 | Status: SHIPPED | OUTPATIENT
Start: 2019-07-01 | End: 2019-07-18

## 2019-07-01 NOTE — PROGRESS NOTES
"Subjective:       Patient ID: Jeanine Chandler is a 64 y.o. female.    Chief Complaint: Establish Care    This pt is new to me.  The pt is followed for type II DM, HTN, HLD, a fib, GERD and depression.  She is followed by Jasmyn Mayo.  She would like to see a cardiologist here for the a fib.  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  She sees an ophthalmologist for diabetic retinopathy.  She wants to change her BP medication--she has a chronic cough with the Lisinopril.  The pt is concerned about her weight.    Review of Systems   Constitutional: Positive for fatigue. Negative for activity change, appetite change and unexpected weight change.   Eyes: Negative for visual disturbance.   Respiratory: Negative for cough, chest tightness and shortness of breath.    Cardiovascular: Negative for chest pain, palpitations and leg swelling.   Gastrointestinal: Negative for abdominal pain, constipation, diarrhea, nausea and vomiting.   Endocrine: Negative for cold intolerance, heat intolerance and polyuria.   Genitourinary: Negative for decreased urine volume and dysuria.   Musculoskeletal: Negative for arthralgias and back pain.   Skin: Negative for rash.   Neurological: Negative for numbness and headaches.   Psychiatric/Behavioral: Negative for dysphoric mood and sleep disturbance. The patient is not nervous/anxious.        Objective:       Vitals:    07/01/19 1000   BP: 112/78   BP Location: Right arm   Patient Position: Sitting   BP Method: Large (Manual)   Pulse: 73   Weight: 81.8 kg (180 lb 5.4 oz)   Height: 5' 1.5" (1.562 m)     Physical Exam   Constitutional: She is oriented to person, place, and time. She appears well-developed and well-nourished.   Pt is obese   HENT:   Head: Normocephalic.   Eyes: Pupils are equal, round, and reactive to light. Conjunctivae and EOM are normal.   Neck: Normal range of motion. Neck supple. No thyromegaly present. "   Cardiovascular: Normal rate and normal heart sounds.   irreg irregular   Pulmonary/Chest: Effort normal and breath sounds normal.   Abdominal: Soft. Bowel sounds are normal. There is no tenderness.   Musculoskeletal: Normal range of motion. She exhibits no tenderness or deformity.   Lymphadenopathy:     She has no cervical adenopathy.   Neurological: She is alert and oriented to person, place, and time. She displays normal reflexes. No cranial nerve deficit. She exhibits normal muscle tone. Coordination normal.   Skin: Skin is warm and dry.   Psychiatric: She has a normal mood and affect. Her behavior is normal.       Assessment:       1. Type 2 diabetes mellitus with both eyes affected by retinopathy without macular edema, with long-term current use of insulin, unspecified retinopathy severity    2. Mild intermittent asthma, unspecified whether complicated    3. Essential hypertension    4. Mixed hyperlipidemia    5. Paroxysmal atrial fibrillation    6. Vitamin D deficiency disease    7. Anxiety    8. Gastroesophageal reflux disease without esophagitis    9. Need for vaccination        Plan:       Jeanine was seen today for establish care.    Diagnoses and all orders for this visit:    Type 2 diabetes mellitus with both eyes affected by retinopathy without macular edema, with long-term current use of insulin, unspecified retinopathy severity    Mild intermittent asthma, unspecified whether complicated  -     albuterol (PROAIR HFA) 90 mcg/actuation inhaler; Inhale 2 puffs into the lungs every 4 (four) hours as needed. 2 HFA Aerosol Inhaler Inhalation Every 4-6 hours  -     fluticasone propionate (FLOVENT HFA) 220 mcg/actuation inhaler; Inhale 1 puff into the lungs 2 (two) times daily. Controller    Essential hypertension  -     losartan-hydrochlorothiazide 50-12.5 mg (HYZAAR) 50-12.5 mg per tablet; Take 1 tablet by mouth once daily.    Mixed hyperlipidemia  -     atorvastatin (LIPITOR) 20 MG tablet; Take 1 tablet  (20 mg total) by mouth once daily.    Paroxysmal atrial fibrillation  -     metoprolol succinate (TOPROL-XL) 100 MG 24 hr tablet; Take 1 tablet (100 mg total) by mouth once daily.  -     Ambulatory consult to Cardiology    Vitamin D deficiency disease    Anxiety  -     escitalopram oxalate (LEXAPRO) 20 MG tablet; Take 1 tablet (20 mg total) by mouth once daily.    Gastroesophageal reflux disease without esophagitis  -     ranitidine (ZANTAC) 150 MG tablet; Take 1 tablet (150 mg total) by mouth nightly.    Need for vaccination  -     (In Office Administered) Pneumococcal Polysaccharide Vaccine (23 Valent) (SQ/IM)    Other orders  -     Cancel: lisinopril-hydrochlorothiazide (PRINZIDE,ZESTORETIC) 20-12.5 mg per tablet; Take 2 tablets by mouth once daily.      During this visit, I reviewed the pt's history, medications, allergies, and problem list.    The patient's BMI has been recorded in the chart. The patient has been provided educational materials regarding the benefits of attaining and maintaining a normal weight. We will continue to address and follow this issue during follow up visits.

## 2019-07-01 NOTE — PROGRESS NOTES
CC: Ms. Jeanine Chandler arrives today for management of Type 2 DM and review of chronic medical conditions, as listed in the Visit Diagnosis section of this encounter.       HPI: Ms. Jeanine Chandler was diagnosed with Type 2 DM in her late 40s. She was diagnosed based on lab work. Initial treatment consisted of metformin and insulin was added a few years later. + FH of DM in mother and sister. Denies hospitalizations due to DM.     Patient was last seen by me in April. Since this time, Ozempic was added and Januvia was discontinued.     She has not yet had her A1c drawn at [facility] . She is able to go this week.    BG monitoring per Freestyle Krupa.    Hypoglycemia: No    Missing Insulin/PO medication doses: No    Exercise: No    Dietary Habits: Eats 2 meals/day. May skip breakfast if she sleeps in. Rare snacking. Avoids sugary beverages.     Last DM education appointment: 4/9/2019    She follows with cardiology for atrial fibrillation.    She has been spending a lot of time with her cousin, whose son was in an accident and remains in a coma.    CURRENT DIABETIC MEDS: metformin 1000 mg BID, Ozempic 0.5 mg weekly, Lantus 50 units QHS  Vial or pen: pen  Glucometer type: unsure    Previous DM treatments:  Januvia    Last Eye Exam: January 2019. Pt reports retinopathy. Can't recall practice or provider's name (believes she saw a retina specialist).   Last Podiatry Exam: n/a    REVIEW OF SYSTEMS  Constitutional: no c/o weaknesss. + fatigue. + 6# weight loss since last visit.   Eyes: denies visual disturbances.  Cardiac: no palpitations or chest pain.  Respiratory: no dyspnea. + cough that she attributes to lisinopril.   GI: no c/o abdominal pain or nausea. Denies h/o pancreatitis.  Skin: no lesions or rashes.   Neuro: no numbness, tingling, or parasthesias.  Endocrine: denies polyphagia, polydipsia, polyuria      Personally reviewed Past Medical, Surgical, Social History.    Vital Signs  /76 (BP Location: Right  "arm, Patient Position: Sitting, BP Method: X-Large (Manual))   Pulse 64   Ht 5' 1.5" (1.562 m)   Wt 81.8 kg (180 lb 5.4 oz)   BMI 33.52 kg/m²     Personally reviewed the below labs:    Hemoglobin A1C   Date Value Ref Range Status   04/03/2019 9.4 (H) <5.7 % of total Hgb Final     Comment:     For someone without known diabetes, a hemoglobin A1c  value of 6.5% or greater indicates that they may have   diabetes and this should be confirmed with a follow-up   test.     For someone with known diabetes, a value <7% indicates   that their diabetes is well controlled and a value   greater than or equal to 7% indicates suboptimal   control. A1c targets should be individualized based on   duration of diabetes, age, comorbid conditions, and   other considerations.     Currently, no consensus exists regarding use of  hemoglobin A1c for diagnosis of diabetes for children.         09/22/2010 6.5 (H) 4.0 - 6.2 % Final   04/03/2009 7.2 (H) 4.0 - 6.2 % Final       Chemistry        Component Value Date/Time     04/03/2019 1403    K 4.1 04/03/2019 1403     04/03/2019 1403    CO2 25 04/03/2019 1403    BUN 13 04/03/2019 1403    CREATININE 0.84 04/03/2019 1403     (H) 04/03/2019 1403        Component Value Date/Time    CALCIUM 8.9 04/03/2019 1403    ALKPHOS 79 04/03/2019 1403    AST 13 04/03/2019 1403    ALT 13 04/03/2019 1403    BILITOT 0.6 04/03/2019 1403    ESTGFRAFRICA 85 04/03/2019 1403    EGFRNONAA 73 04/03/2019 1403          Lab Results   Component Value Date    CHOL 151 04/03/2019    CHOL 195 05/21/2018    CHOL 138 09/22/2010     Lab Results   Component Value Date    HDL 43 (L) 04/03/2019    HDL 32 (L) 05/21/2018    HDL 33 (L) 09/22/2010     Lab Results   Component Value Date    LDLCALC 75 04/03/2019    LDLCALC 121 (H) 05/21/2018    LDLCALC 71.8 09/22/2010     Lab Results   Component Value Date    TRIG 248 (H) 04/03/2019    TRIG 271 (H) 05/21/2018    TRIG 166 (H) 09/22/2010     Lab Results   Component " Value Date    CHOLHDL 3.5 04/03/2019    CHOLHDL 6.1 (H) 05/21/2018    CHOLHDL 23.9 09/22/2010       Lab Results   Component Value Date    MICALBCREAT 11 04/05/2019     Lab Results   Component Value Date    TSH 2.40 04/03/2019       CrCl cannot be calculated (Patient's most recent lab result is older than the maximum 7 days allowed.).    No results found for: [id number]      PHYSICAL EXAMINATION  Constitutional: Appears well, no distress  Neck: Supple, trachea midline; no thyromegaly or nodules.   Respiratory: CTA, even and unlabored.  Cardiovascular: irregular rhythm, variable rate, no murmurs, no carotid bruits. DP pulses  1+ bilaterally; no edema.    GI: bowel sounds active, no hernia noted  Lymph: no cervical or supraclavicular lymphadenopathy  Skin: warm and dry; no lipohypertrophy, or acanthosis nigracans observed.  Neuro: DTR 2+ BUE/1+BLE. Previously, no loss of protective sensation via 10 gm monofilament or vibratory exam bilaterally.   Feet: appropriate footwear.      CGMS DOWNLOAD: See media file for details. Many glucoses are within normal range. Occasional excursions but not with a consistent pattern. One isolated episode of hypoglycemia between 8-11 AM.  Average glucose: 151  Above goal: 21%  Within goal: 78%  Below goal: 1%    A1c target < 7%      Assessment/Plan  1. Type 2 diabetes mellitus with retinopathy of both eyes, with long-term current use of insulin, macular edema presence unspecified, unspecified retinopathy severity  -- A1c this week at [facility]. Glucoses have improved, per CGMS interpretation.  -- continue metformin, Ozempic, and Lantus at current doses.  -- BG monitoring per Freestyle Krupa. I advised pt to use Freestyle Hayden strips with Krupa as needed.  -- advised pt to scan CGMS at minimum every 8 hours.    -- Discussed diagnosis of DM, A1c goals, progression of disease, long term complications and tx options.    -- Reviewed hypoglycemia management: treat with 1/2 glass of juice, 1/2 can  regular coke, or 4 glucose tablets. Monitor and repeat treatment every 15 minutes until BG is >70 Then have a snack, which includes a complex carbohydrate and protein.   Advised patient to check BG before activities, such as driving or exercise.    -- takes statin, ACE-I, Xarelto     2. Essential hypertension  -- controlled  -- consider changing to ARB-HCTZ combination, due to cough that may be caused by lisinopril. She would like to discuss with PCP at visit today.   3. Hyperlipidemia, unspecified hyperlipidemia type  -- uncontrolled. However, pt has since resumed atorvastatin       FOLLOW UP  Follow up in about 3 months (around 10/1/2019).   Patient instructed to bring BG logs to each follow up   Patient encouraged to call for any BG/medication issues, concerns, or questions.      Labs at [facility]:  Orders Placed This Encounter   Procedures    Hemoglobin A1c    Basic metabolic panel

## 2019-07-02 ENCOUNTER — TELEPHONE (OUTPATIENT)
Dept: ENDOCRINOLOGY | Facility: CLINIC | Age: 64
End: 2019-07-02

## 2019-07-02 LAB — HBA1C MFR BLD: 7.1 % OF TOTAL HGB

## 2019-07-02 NOTE — TELEPHONE ENCOUNTER
Reviewed lab results. Please call patient and notify her that A1c has decreased from 9.4% to 7.1%. Great improvement!

## 2019-07-18 ENCOUNTER — OFFICE VISIT (OUTPATIENT)
Dept: CARDIOLOGY | Facility: CLINIC | Age: 64
End: 2019-07-18
Payer: MEDICARE

## 2019-07-18 VITALS
WEIGHT: 181.69 LBS | BODY MASS INDEX: 34.3 KG/M2 | DIASTOLIC BLOOD PRESSURE: 70 MMHG | SYSTOLIC BLOOD PRESSURE: 112 MMHG | HEIGHT: 61 IN | HEART RATE: 68 BPM

## 2019-07-18 DIAGNOSIS — I48.0 PAROXYSMAL ATRIAL FIBRILLATION: Primary | ICD-10-CM

## 2019-07-18 DIAGNOSIS — I10 ESSENTIAL HYPERTENSION: ICD-10-CM

## 2019-07-18 DIAGNOSIS — E78.2 MIXED HYPERLIPIDEMIA: ICD-10-CM

## 2019-07-18 PROCEDURE — 99204 OFFICE O/P NEW MOD 45 MIN: CPT | Mod: S$PBB,,, | Performed by: INTERNAL MEDICINE

## 2019-07-18 PROCEDURE — 99213 OFFICE O/P EST LOW 20 MIN: CPT | Mod: PBBFAC,PO,25 | Performed by: INTERNAL MEDICINE

## 2019-07-18 PROCEDURE — 99999 PR PBB SHADOW E&M-EST. PATIENT-LVL III: CPT | Mod: PBBFAC,,, | Performed by: INTERNAL MEDICINE

## 2019-07-18 PROCEDURE — 99204 PR OFFICE/OUTPT VISIT, NEW, LEVL IV, 45-59 MIN: ICD-10-PCS | Mod: S$PBB,,, | Performed by: INTERNAL MEDICINE

## 2019-07-18 PROCEDURE — 93010 EKG 12-LEAD: ICD-10-PCS | Mod: S$PBB,,, | Performed by: INTERNAL MEDICINE

## 2019-07-18 PROCEDURE — 93005 ELECTROCARDIOGRAM TRACING: CPT | Mod: PBBFAC,PO | Performed by: INTERNAL MEDICINE

## 2019-07-18 PROCEDURE — 93010 ELECTROCARDIOGRAM REPORT: CPT | Mod: S$PBB,,, | Performed by: INTERNAL MEDICINE

## 2019-07-18 PROCEDURE — 99999 PR PBB SHADOW E&M-EST. PATIENT-LVL III: ICD-10-PCS | Mod: PBBFAC,,, | Performed by: INTERNAL MEDICINE

## 2019-07-18 RX ORDER — ATORVASTATIN CALCIUM 40 MG/1
40 TABLET, FILM COATED ORAL DAILY
Qty: 90 TABLET | Refills: 3 | Status: SHIPPED | OUTPATIENT
Start: 2019-07-18 | End: 2020-08-17

## 2019-07-18 NOTE — LETTER
July 18, 2019      GLENROY Kirby MD  [address] [facility] [address] [place] [address]           [facility]  [address] [facility] [address] [place] [address]  Phone: [phone]          Patient: Jeanine Chandler   MR Number: [id number]   YOB: 1955   Date of Visit: 7/18/2019       Dear Dr. GLENROY Kirby:    Thank you for referring Jeanine Chandler to me for evaluation. Attached you will find relevant portions of my assessment and plan of care.    If you have questions, please do not hesitate to call me. I look forward to following Jeanine Chandler along with you.    Sincerely,    Lul Wright MD    Enclosure  CC:  No Recipients    If you would like to receive this communication electronically, please contact [email or web address] or [phone] to request more information on [employer] Link access.    For providers and/or their staff who would like to refer a patient to [facility], please contact us through our one-stop-shop provider referral line, [facility] , at [phone].    If you feel you have received this communication in error or would no longer like to receive these types of communications, please e-mail [email or web address]

## 2019-07-18 NOTE — PROGRESS NOTES
Subjective:    Patient ID:  Jeanine Chandler is a 64 y.o. female who presents for evaluation of Consult (PAF)      Problem List Items Addressed This Visit        Cardiac/Vascular    Essential hypertension    Mixed hyperlipidemia    Paroxysmal atrial fibrillation - Primary          HPI    Referred by Dr. Kirby    Here to establish care for paroxysmal atrial fibrillation.    The patient states that she first got diagnosed with aFib about 2-3 years ago while being evaluated for a Colonoscopy. Since then, saw Dr. Napoles and was on Xarelto and BB for aFib management.     Does not feel when she goes in and out of aFib, so unsure how often she is in it.    No chest pain or shortness of breath.  No personal history of MI or stroke.    No bleeding on Xarelto.    Past Medical History:   Diagnosis Date    Asthma     DCIS (ductal carcinoma in situ)     left breast; s/p lumpectomy and treated on protocol NSABP       Diabetes mellitus     Diabetes mellitus, type 2     DJD (degenerative joint disease) of cervical spine     Dysphonia     GERD (gastroesophageal reflux disease)     Hyperlipidemia     Hypertension     Localized, primary osteoarthritis of lower leg 2014    Osteopenia     Vitamin D deficiency disease        Past Surgical History:   Procedure Laterality Date     elbow      right    BREAST LUMPECTOMY Left 2005    BREAST SURGERY       SECTION  1979    1    COLONOSCOPY Golytely N/A 2017    Performed by [facility]    HERNIA REPAIR      left inguinal    HYSTERECTOMY      INJECTION, STEROID, SPINE, CERVICAL, EPIDURAL N/A 6/10/2013    Performed by [facility]ia [facility] at [facility]    laparscopic      exploratory    myomectomy  1982       Family History   Problem Relation Age of Onset    Diabetes Mother     Heart disease Mother     Cancer Mother     Atrial fibrillation Mother     Stroke Sister     Diabetes Sister     Heart disease Maternal Uncle      Diabetes Maternal Grandmother     Thyroid disease Sister        Social History     Socioeconomic History    Marital status:      Spouse name: Mark    Number of children: 1    Years of education: Not on file    Highest education level: Not on file   Occupational History    Occupation: retired RN   Social Needs    Financial resource strain: Not on file    Food insecurity:     Worry: Not on file     Inability: Not on file    Transportation needs:     Medical: Not on file     Non-medical: Not on file   Tobacco Use    Smoking status: Never Smoker    Smokeless tobacco: Never Used   Substance and Sexual Activity    Alcohol use: No    Drug use: No    Sexual activity: Yes     Partners: Male   Lifestyle    Physical activity:     Days per week: Not on file     Minutes per session: Not on file    Stress: Not on file   Relationships    Social connections:     Talks on phone: Not on file     Gets together: Not on file     Attends Jew service: Not on file     Active member of club or organization: Not on file     Attends meetings of clubs or organizations: Not on file     Relationship status: Not on file   Other Topics Concern    Not on file   Social History Narrative    Not on file       Review of patient's allergies indicates:   Allergen Reactions    Cephalosporins      Other reaction(s): Unknown    Latex      Other reaction(s): Rash       Review of Systems   Constitution: Negative for decreased appetite, fever and malaise/fatigue.   Eyes: Negative for blurred vision.   Cardiovascular: Negative for chest pain, dyspnea on exertion, irregular heartbeat and leg swelling.   Respiratory: Negative for cough, hemoptysis, shortness of breath and wheezing.    Endocrine: Negative for cold intolerance and heat intolerance.   Hematologic/Lymphatic: Negative for bleeding problem.   Musculoskeletal: Negative for muscle weakness and myalgias.   Gastrointestinal: Negative for abdominal pain, constipation and  "diarrhea.   Genitourinary: Negative for bladder incontinence.   Neurological: Negative for dizziness and weakness.   Psychiatric/Behavioral: Negative for depression.        Objective:     Vitals:    07/18/19 1110   BP: 112/70   BP Location: Left arm   Patient Position: Sitting   BP Method: Large (Manual)   Pulse: 68   Weight: 82.4 kg (181 lb 10.5 oz)   Height: 5' 1" (1.549 m)        Physical Exam   Constitutional: She is oriented to person, place, and time. She appears well-developed and well-nourished. No distress.   HENT:   Head: Normocephalic and atraumatic.   Neck: Neck supple. No JVD present.   Cardiovascular: Normal rate, regular rhythm and normal heart sounds. Exam reveals no gallop and no friction rub.   No murmur heard.  Pulmonary/Chest: Effort normal and breath sounds normal. No respiratory distress. She has no wheezes. She has no rales.   Abdominal: Soft. Bowel sounds are normal. There is no tenderness. There is no rebound and no guarding.   Musculoskeletal: She exhibits no edema or tenderness.   Neurological: She is alert and oriented to person, place, and time.   Skin: Skin is warm and dry.   Psychiatric: Her behavior is normal.           Current Outpatient Medications on File Prior to Visit   Medication Sig    albuterol (PROAIR HFA) 90 mcg/actuation inhaler Inhale 2 puffs into the lungs every 4 (four) hours as needed. 2 HFA Aerosol Inhaler Inhalation Every 4-6 hours    atorvastatin (LIPITOR) 20 MG tablet Take 1 tablet (20 mg total) by mouth once daily.    blood sugar diagnostic (CONTOUR TEST STRIPS) Strp Test 4 x daily    blood sugar diagnostic Strp To check BG 3 times daily, to use with insurance preferred meter    escitalopram oxalate (LEXAPRO) 20 MG tablet Take 1 tablet (20 mg total) by mouth once daily.    fexofenadine (ALLEGRA) 180 MG tablet Take 180 mg by mouth once daily.    flash glucose scanning reader (OnetoOnetext HECTOR 14 DAY READER) Misc 1 each by Misc.(Non-Drug; Combo Route) route " "continuous.    flash glucose sensor (FREESTYLE HECTOR 14 DAY SENSOR) Kit Us to check blood sugar continuously  Change sensor every 14 days    fluticasone propionate (FLOVENT HFA) 220 mcg/actuation inhaler Inhale 1 puff into the lungs 2 (two) times daily. Controller    insulin (LANTUS SOLOSTAR U-100 INSULIN) glargine 100 units/mL (3mL) SubQ pen Inject 50 Units into the skin every evening.    lancets Misc To check BG 3 times daily, to use with insurance preferred meter. Dispense with appropriate lancing device    losartan-hydrochlorothiazide 50-12.5 mg (HYZAAR) 50-12.5 mg per tablet Take 1 tablet by mouth once daily.    metFORMIN (GLUCOPHAGE) 1000 MG tablet Take 1 tablet (1,000 mg total) by mouth 2 (two) times daily with meals.    metoprolol succinate (TOPROL-XL) 100 MG 24 hr tablet Take 1 tablet (100 mg total) by mouth once daily.    pen needle, diabetic (BD ULTRA-FINE SHORT PEN NEEDLE) 31 gauge x 5/16" Ndle USE WITH INSULIN EVERY EVENING AS DIRECTED    ranitidine (ZANTAC) 150 MG tablet Take 1 tablet (150 mg total) by mouth nightly.    rivaroxaban (XARELTO) 20 mg Tab Take 1 tablet (20 mg total) by mouth daily with dinner or evening meal.    semaglutide (OZEMPIC) 0.25 mg or 0.5 mg(2 mg/1.5 mL) PnIj Inject 0.5 mg into the skin every 7 days.    vitamin D (VITAMIN D3) 1000 units Tab Take 2,000 Units by mouth once daily.    blood-glucose meter kit To check BG 3 times daily, to use with insurance preferred meter    epinephrine (EPIPEN) 0.3 mg/0.3 mL (1:1,000) AtIn Inject 0.3 mLs (0.3 mg total) into the muscle once. Pen Injector Intramuscular .  As directed for anaphylaxis PRN    [DISCONTINUED] lisinopril-hydrochlorothiazide (PRINZIDE,ZESTORETIC) 20-12.5 mg per tablet TAKE 2 TABLETS BY MOUTH EVERY DAY     No current facility-administered medications on file prior to visit.        Lipid Panel:   Lab Results   Component Value Date    CHOL 151 04/03/2019    HDL 43 (L) 04/03/2019    LDLCALC 75 04/03/2019    TRIG " 248 (H) 04/03/2019    CHOLHDL 3.5 04/03/2019         The 10-year ASCVD risk score (Strutherscarmina ARAGON Jr., et al., 2013) is: 11.5%    Values used to calculate the score:      Age: 64 years      Sex: Female      Is Non- : No      Diabetic: Yes      Tobacco smoker: No      Systolic Blood Pressure: 112 mmHg      Is BP treated: Yes      HDL Cholesterol: 43 mg/dL      Total Cholesterol: 151 mg/dL    All pertinent labs, imaging, and EKGs reviewed.    Assessment:       1. Paroxysmal atrial fibrillation    2. Essential hypertension    3. Mixed hyperlipidemia         Plan:     Doing well from a symptom standpoint  BP/pulse good today  In sinus rhythm     Echocardiogram  Continue Xarelto 20 mg PO daily.  Emphasized to the patient to take the medication with the largest meal of the day.    Continue metoprolol succinate 100 mg PO Daily   Increase atorvastatin to 40 mg PO Daily based on elevated ASCVD risk score - Educated on risks/benefits of medication     Continue other cardiac medications  Mediterranean Diet/Cardiovascular Exercise Program    Patient queried and all questions were answered.    F/u in 6 months to reassess      Signed:    Lul Wright MD  7/18/2019 8:43 AM

## 2019-07-29 ENCOUNTER — TELEPHONE (OUTPATIENT)
Dept: FAMILY MEDICINE | Facility: CLINIC | Age: 64
End: 2019-07-29

## 2019-07-29 ENCOUNTER — TELEPHONE (OUTPATIENT)
Dept: ENDOCRINOLOGY | Facility: CLINIC | Age: 64
End: 2019-07-29

## 2019-07-29 NOTE — TELEPHONE ENCOUNTER
Spoke to pt's , he stated the lab TSH was coded incorrectly and dx code needed to be changed. Adv that lab was not ordered by us and gave ordering provider's info, voiced understanding.

## 2019-07-29 NOTE — TELEPHONE ENCOUNTER
----- Message from Cali Calloway sent at 7/29/2019 10:14 AM CDT -----  Contact:  Mark                  need to speak with a nurse regarding changing a code for billing purposes, he spoke with billing already and was told to speak with your office. Please call back at [phone]

## 2019-07-29 NOTE — TELEPHONE ENCOUNTER
----- Message from Marianela Rolle sent at 7/29/2019 10:24 AM CDT -----  Type: Needs Medical Advice    Who Called:  -Mark Chandler   Symptoms (please be specific):  NA   How long has patient had these symptoms: ERICK   Pharmacy name and phone #:  ERICK   Best Call Back Number: [phone]  Additional Information: Patient spoke to [facility] billing dept, pt  was advised to speak with the provider's office regarding a billing issue

## 2019-07-29 NOTE — TELEPHONE ENCOUNTER
Called pt . He stated that there was a incorrect coding on some blood work that was done in April. I infromed Vernon to see if we can have it changed

## 2019-07-30 ENCOUNTER — TELEPHONE (OUTPATIENT)
Dept: FAMILY MEDICINE | Facility: CLINIC | Age: 64
End: 2019-07-30

## 2019-07-30 NOTE — TELEPHONE ENCOUNTER
Called informed  that the billing issues should be taken care of now. He verbalized understanding

## 2019-07-30 NOTE — TELEPHONE ENCOUNTER
----- Message from Lore Lozano sent at 7/30/2019  2:44 PM CDT -----  .Type:  Patient Returning Call    Who Called:mr sow-  Who Left Message for Patient:anil  Does the patient know what this is regarding?:  Would the patient rather a call back or a response via [facility]? call  Best Call Back Number:[phone]  Additional Information:

## 2019-07-31 ENCOUNTER — CLINICAL SUPPORT (OUTPATIENT)
Dept: CARDIOLOGY | Facility: CLINIC | Age: 64
End: 2019-07-31
Attending: INTERNAL MEDICINE
Payer: MEDICARE

## 2019-07-31 VITALS
DIASTOLIC BLOOD PRESSURE: 90 MMHG | SYSTOLIC BLOOD PRESSURE: 132 MMHG | HEIGHT: 61 IN | BODY MASS INDEX: 34.17 KG/M2 | HEART RATE: 110 BPM | WEIGHT: 181 LBS

## 2019-07-31 DIAGNOSIS — I10 ESSENTIAL HYPERTENSION: ICD-10-CM

## 2019-07-31 DIAGNOSIS — I48.0 PAROXYSMAL ATRIAL FIBRILLATION: ICD-10-CM

## 2019-07-31 LAB
ASCENDING AORTA: 2.45 CM
AV INDEX (PROSTH): 0.77
AV MEAN GRADIENT: 6 MMHG
AV PEAK GRADIENT: 9 MMHG
AV VALVE AREA: 2.09 CM2
AV VELOCITY RATIO: 0.69
BSA FOR ECHO PROCEDURE: 1.88 M2
CV ECHO LV RWT: 0.56 CM
DOP CALC AO PEAK VEL: 1.5 M/S
DOP CALC AO VTI: 25.82 CM
DOP CALC LVOT AREA: 2.7 CM2
DOP CALC LVOT DIAMETER: 1.86 CM
DOP CALC LVOT PEAK VEL: 1.04 M/S
DOP CALC LVOT STROKE VOLUME: 53.96 CM3
DOP CALCLVOT PEAK VEL VTI: 19.87 CM
ECHO LV POSTERIOR WALL: 1 CM (ref 0.6–1.1)
FRACTIONAL SHORTENING: 36 % (ref 28–44)
INTERVENTRICULAR SEPTUM: 1.02 CM (ref 0.6–1.1)
IVRT: 0.09 MSEC
LA MAJOR: 4.99 CM
LA MINOR: 4.88 CM
LA WIDTH: 2.86 CM
LEFT ATRIUM SIZE: 3.38 CM
LEFT ATRIUM VOLUME INDEX: 22.4 ML/M2
LEFT ATRIUM VOLUME: 40.54 CM3
LEFT INTERNAL DIMENSION IN SYSTOLE: 2.28 CM (ref 2.1–4)
LEFT VENTRICLE DIASTOLIC VOLUME INDEX: 29.19 ML/M2
LEFT VENTRICLE DIASTOLIC VOLUME: 52.85 ML
LEFT VENTRICLE MASS INDEX: 59 G/M2
LEFT VENTRICLE SYSTOLIC VOLUME INDEX: 9.8 ML/M2
LEFT VENTRICLE SYSTOLIC VOLUME: 17.76 ML
LEFT VENTRICULAR INTERNAL DIMENSION IN DIASTOLE: 3.56 CM (ref 3.5–6)
LEFT VENTRICULAR MASS: 107.61 G
PISA TR MAX VEL: 2.41 M/S
RA MAJOR: 3.6 CM
RA PRESSURE: 3 MMHG
RA WIDTH: 2.49 CM
RIGHT VENTRICULAR END-DIASTOLIC DIMENSION: 2.6 CM
RV TISSUE DOPPLER FREE WALL SYSTOLIC VELOCITY 1 (APICAL 4 CHAMBER VIEW): 12.32 CM/S
SINUS: 2.47 CM
STJ: 1.73 CM
TDI LATERAL: 0.11 M/S
TDI SEPTAL: 0.08 M/S
TDI: 0.1 M/S
TR MAX PG: 23 MMHG
TRICUSPID ANNULAR PLANE SYSTOLIC EXCURSION: 1.62 CM
TV REST PULMONARY ARTERY PRESSURE: 26 MMHG

## 2019-07-31 PROCEDURE — 99999 PR PBB SHADOW E&M-EST. PATIENT-LVL II: CPT | Mod: PBBFAC,,,

## 2019-07-31 PROCEDURE — 99212 OFFICE O/P EST SF 10 MIN: CPT | Mod: PBBFAC,PO

## 2019-07-31 PROCEDURE — 93306 TTE W/DOPPLER COMPLETE: CPT | Mod: PBBFAC,PO | Performed by: INTERNAL MEDICINE

## 2019-07-31 PROCEDURE — 93306 TRANSTHORACIC ECHO (TTE) COMPLETE (CUPID ONLY): ICD-10-PCS | Mod: 26,S$PBB,, | Performed by: INTERNAL MEDICINE

## 2019-07-31 PROCEDURE — 99999 PR PBB SHADOW E&M-EST. PATIENT-LVL II: ICD-10-PCS | Mod: PBBFAC,,,

## 2019-08-01 ENCOUNTER — TELEPHONE (OUTPATIENT)
Dept: CARDIOLOGY | Facility: CLINIC | Age: 64
End: 2019-08-01

## 2019-08-15 ENCOUNTER — TELEPHONE (OUTPATIENT)
Dept: FAMILY MEDICINE | Facility: CLINIC | Age: 64
End: 2019-08-15

## 2019-08-15 NOTE — TELEPHONE ENCOUNTER
----- Message from Leyla Kang sent at 8/15/2019  2:23 PM CDT -----  Contact: Mark   Type: Needs Medical Advice    Who Called:  Mark Valadez Call Back Number: [phone]  Additional Information: Pls call Mark regarding a billing/coding issue on a bill. He adv'd he has been trying to get this resolved for over a month now.

## 2019-08-16 RX ORDER — INSULIN GLARGINE 100 [IU]/ML
50 INJECTION, SOLUTION SUBCUTANEOUS DAILY
Qty: 45 ML | Refills: 3 | Status: SHIPPED | OUTPATIENT
Start: 2019-08-16 | End: 2019-10-24

## 2019-09-17 NOTE — PROGRESS NOTES
Subjective:       Patient ID: Jeanine Chandler is a 64 y.o. female.    Chief Complaint: No chief complaint on file.    HPI Ms. Chandler is a 64-year-old white female who   returned to clinic for followup of ductal carcinoma in situ of the left      breast.  She had lumpectomy in 05/2005 and was treated on protocol NSABP     B-35 (Arimidex versus tamoxifen).  She completed that therapy in 09/2010.    Today she reports that she has been feeling well. She has no pain and no shortness of breath.  She has been enjoying helping care for her 2-year-old grandchild.  She has had an adjustment in her diabetes medication and reports that her blood sugars have improved.  She has been watching her diet is well.    Review of Systems   Constitutional: Negative for activity change, appetite change and unexpected weight change.   Respiratory: Negative for cough and shortness of breath.    Cardiovascular: Negative for chest pain.   Gastrointestinal: Negative for abdominal pain, nausea and vomiting.   Musculoskeletal: Negative for back pain and neck pain.   Neurological: Negative for headaches.   Psychiatric/Behavioral: Negative for dysphoric mood. The patient is not nervous/anxious.        Objective:      Physical Exam   Constitutional: She appears well-developed and well-nourished.   Cardiovascular: Normal rate, regular rhythm and normal heart sounds.   Pulmonary/Chest: Effort normal and breath sounds normal. She has no wheezes. She has no rales. Right breast exhibits no mass, no nipple discharge and no skin change. Left breast exhibits no mass, no nipple discharge and no skin change.       Abdominal: Soft. She exhibits no mass. There is no tenderness.   Psychiatric: She has a normal mood and affect. Her behavior is normal. Thought content normal.       Assessment:     mammograms  -  No evidence of malignancy  1. Ductal carcinoma in situ (DCIS) of left breast        Plan:       Return in one year for follow-up mammograms.

## 2019-09-18 ENCOUNTER — OFFICE VISIT (OUTPATIENT)
Dept: HEMATOLOGY/ONCOLOGY | Facility: CLINIC | Age: 64
End: 2019-09-18
Payer: MEDICARE

## 2019-09-18 ENCOUNTER — HOSPITAL ENCOUNTER (OUTPATIENT)
Dept: RADIOLOGY | Facility: HOSPITAL | Age: 64
Discharge: HOME | End: 2019-09-18
Attending: INTERNAL MEDICINE
Payer: MEDICARE

## 2019-09-18 VITALS
TEMPERATURE: 98 F | BODY MASS INDEX: 34.39 KG/M2 | HEIGHT: 61 IN | SYSTOLIC BLOOD PRESSURE: 122 MMHG | WEIGHT: 182.13 LBS | RESPIRATION RATE: 20 BRPM | DIASTOLIC BLOOD PRESSURE: 62 MMHG | OXYGEN SATURATION: 99 % | HEART RATE: 107 BPM

## 2019-09-18 VITALS — HEIGHT: 61 IN | BODY MASS INDEX: 34.17 KG/M2 | WEIGHT: 181 LBS

## 2019-09-18 DIAGNOSIS — C50.912 BREAST CANCER, LEFT: ICD-10-CM

## 2019-09-18 DIAGNOSIS — D05.10 DUCTAL CARCINOMA IN SITU (DCIS) OF BREAST, UNSPECIFIED LATERALITY: ICD-10-CM

## 2019-09-18 DIAGNOSIS — D05.12 DUCTAL CARCINOMA IN SITU (DCIS) OF LEFT BREAST: Primary | ICD-10-CM

## 2019-09-18 PROCEDURE — 77066 MAMMO DIGITAL DIAGNOSTIC BILAT WITH TOMOSYNTHESIS_CAD: ICD-10-PCS | Mod: 26,,, | Performed by: RADIOLOGY

## 2019-09-18 PROCEDURE — 77062 MAMMO DIGITAL DIAGNOSTIC BILAT WITH TOMOSYNTHESIS_CAD: ICD-10-PCS | Mod: 26,,, | Performed by: RADIOLOGY

## 2019-09-18 PROCEDURE — 99999 PR PBB SHADOW E&M-EST. PATIENT-LVL III: CPT | Mod: PBBFAC,,, | Performed by: INTERNAL MEDICINE

## 2019-09-18 PROCEDURE — 99213 OFFICE O/P EST LOW 20 MIN: CPT | Mod: PBBFAC | Performed by: INTERNAL MEDICINE

## 2019-09-18 PROCEDURE — 99213 PR OFFICE/OUTPT VISIT, EST, LEVL III, 20-29 MIN: ICD-10-PCS | Mod: S$PBB,,, | Performed by: INTERNAL MEDICINE

## 2019-09-18 PROCEDURE — 77066 DX MAMMO INCL CAD BI: CPT | Mod: 26,,, | Performed by: RADIOLOGY

## 2019-09-18 PROCEDURE — 77062 BREAST TOMOSYNTHESIS BI: CPT | Mod: 26,,, | Performed by: RADIOLOGY

## 2019-09-18 PROCEDURE — 99213 OFFICE O/P EST LOW 20 MIN: CPT | Mod: S$PBB,,, | Performed by: INTERNAL MEDICINE

## 2019-09-18 PROCEDURE — 99999 PR PBB SHADOW E&M-EST. PATIENT-LVL III: ICD-10-PCS | Mod: PBBFAC,,, | Performed by: INTERNAL MEDICINE

## 2019-09-18 PROCEDURE — 77062 BREAST TOMOSYNTHESIS BI: CPT | Mod: TC,PO

## 2019-10-24 ENCOUNTER — OFFICE VISIT (OUTPATIENT)
Dept: ENDOCRINOLOGY | Facility: CLINIC | Age: 64
End: 2019-10-24
Payer: MEDICARE

## 2019-10-24 VITALS
HEIGHT: 62 IN | DIASTOLIC BLOOD PRESSURE: 74 MMHG | WEIGHT: 179.81 LBS | HEART RATE: 83 BPM | SYSTOLIC BLOOD PRESSURE: 118 MMHG | BODY MASS INDEX: 33.09 KG/M2

## 2019-10-24 DIAGNOSIS — I10 ESSENTIAL HYPERTENSION: ICD-10-CM

## 2019-10-24 DIAGNOSIS — E11.649 HYPOGLYCEMIA UNAWARENESS ASSOCIATED WITH TYPE 2 DIABETES MELLITUS: ICD-10-CM

## 2019-10-24 DIAGNOSIS — Z79.4 TYPE 2 DIABETES MELLITUS WITH BOTH EYES AFFECTED BY RETINOPATHY WITHOUT MACULAR EDEMA, WITH LONG-TERM CURRENT USE OF INSULIN, UNSPECIFIED RETINOPATHY SEVERITY: Primary | ICD-10-CM

## 2019-10-24 DIAGNOSIS — E78.2 MIXED HYPERLIPIDEMIA: ICD-10-CM

## 2019-10-24 DIAGNOSIS — E11.319 TYPE 2 DIABETES MELLITUS WITH BOTH EYES AFFECTED BY RETINOPATHY WITHOUT MACULAR EDEMA, WITH LONG-TERM CURRENT USE OF INSULIN, UNSPECIFIED RETINOPATHY SEVERITY: Primary | ICD-10-CM

## 2019-10-24 PROCEDURE — 95251 PR GLUCOSE MONITOR, 72 HOUR, PHYS INTERP: ICD-10-PCS | Mod: ,,, | Performed by: NURSE PRACTITIONER

## 2019-10-24 PROCEDURE — 99999 PR PBB SHADOW E&M-EST. PATIENT-LVL V: CPT | Mod: PBBFAC,,, | Performed by: NURSE PRACTITIONER

## 2019-10-24 PROCEDURE — 99215 OFFICE O/P EST HI 40 MIN: CPT | Mod: PBBFAC,PO | Performed by: NURSE PRACTITIONER

## 2019-10-24 PROCEDURE — 99214 PR OFFICE/OUTPT VISIT, EST, LEVL IV, 30-39 MIN: ICD-10-PCS | Mod: S$PBB,,, | Performed by: NURSE PRACTITIONER

## 2019-10-24 PROCEDURE — 99999 PR PBB SHADOW E&M-EST. PATIENT-LVL V: ICD-10-PCS | Mod: PBBFAC,,, | Performed by: NURSE PRACTITIONER

## 2019-10-24 PROCEDURE — 99214 OFFICE O/P EST MOD 30 MIN: CPT | Mod: S$PBB,,, | Performed by: NURSE PRACTITIONER

## 2019-10-24 PROCEDURE — 95251 CONT GLUC MNTR ANALYSIS I&R: CPT | Mod: ,,, | Performed by: NURSE PRACTITIONER

## 2019-10-24 RX ORDER — INSULIN GLARGINE 100 [IU]/ML
38 INJECTION, SOLUTION SUBCUTANEOUS DAILY
Qty: 45 ML | Refills: 3
Start: 2019-10-24 | End: 2020-03-24

## 2019-10-24 NOTE — PATIENT INSTRUCTIONS
Decrease Lantus to 38 units nightly.   If fasting blood sugars are persistently > 130, increase dose to 42 units.     Today, you will have A1c and BMP drawn.  1 week before your 3 month follow up, please have A1c, CMP, lipid panel, and urine microalbumin/creatinine ratio obtained at [facility].

## 2019-10-24 NOTE — PROGRESS NOTES
"CC: Ms. Jeanine Chandler arrives today for management of Type 2 DM and review of chronic medical conditions, as listed in the Visit Diagnosis section of this encounter.       HPI: Ms. Jeanine Chandler was diagnosed with Type 2 DM in her late 40s. She was diagnosed based on lab work. Initial treatment consisted of metformin and insulin was added a few years later. + FH of DM in mother and sister. Denies hospitalizations due to DM.     Patient was last seen by me in July.     She has not yet had her lab work drawn at [facility]. She is able to go today.    BG monitoring per [employer].    Hypoglycemia: No    Missing Insulin/PO medication doses: No    Exercise: No    Dietary Habits: Eats 2-3 meals/day. If she skips a meal, she has a snack of apple with PB. Avoids sugary beverages.     Last DM education appointment: 4/9/2019    She is expecting her second grandchild in December.       CURRENT DIABETIC MEDS: metformin 1000 mg BID, Ozempic 0.5 mg weekly, Lantus 50 units QHS  Vial or pen: pen  Glucometer type: unsure    Previous DM treatments:  Januvia    Last Eye Exam: January 2019. Pt reports retinopathy. Can't recall practice or provider's name (believes she saw a retina specialist).   Last Podiatry Exam: n/a    REVIEW OF SYSTEMS  Constitutional: no c/o weakness, weight loss. + fatigue.  Eyes: denies visual disturbances.  Cardiac: no palpitations or chest pain.  Respiratory: no dyspnea, cough  GI: no c/o abdominal pain or nausea. Denies h/o pancreatitis.  Skin: no lesions or rashes.   Neuro: no numbness, tingling, or parasthesias.  Endocrine: denies polyphagia, polydipsia, polyuria      Personally reviewed Past Medical, Surgical, Social History.    Vital Signs  /74   Pulse 83   Ht 5' 1.5" (1.562 m)   Wt 81.5 kg (179 lb 12.6 oz)   BMI 33.42 kg/m²     Personally reviewed the below labs:    Hemoglobin A1C   Date Value Ref Range Status   07/01/2019 7.1 (H) <5.7 % of total Hgb Final     Comment:     For someone " without known diabetes, a hemoglobin A1c  value of 6.5% or greater indicates that they may have   diabetes and this should be confirmed with a follow-up   test.     For someone with known diabetes, a value <7% indicates   that their diabetes is well controlled and a value   greater than or equal to 7% indicates suboptimal   control. A1c targets should be individualized based on   duration of diabetes, age, comorbid conditions, and   other considerations.     Currently, no consensus exists regarding use of  hemoglobin A1c for diagnosis of diabetes for children.         04/03/2019 9.4 (H) <5.7 % of total Hgb Final     Comment:     For someone without known diabetes, a hemoglobin A1c  value of 6.5% or greater indicates that they may have   diabetes and this should be confirmed with a follow-up   test.     For someone with known diabetes, a value <7% indicates   that their diabetes is well controlled and a value   greater than or equal to 7% indicates suboptimal   control. A1c targets should be individualized based on   duration of diabetes, age, comorbid conditions, and   other considerations.     Currently, no consensus exists regarding use of  hemoglobin A1c for diagnosis of diabetes for children.         09/22/2010 6.5 (H) 4.0 - 6.2 % Final       Chemistry        Component Value Date/Time     04/03/2019 1403    K 4.1 04/03/2019 1403     04/03/2019 1403    CO2 25 04/03/2019 1403    BUN 13 04/03/2019 1403    CREATININE 0.84 04/03/2019 1403     (H) 04/03/2019 1403        Component Value Date/Time    CALCIUM 8.9 04/03/2019 1403    ALKPHOS 79 04/03/2019 1403    AST 13 04/03/2019 1403    ALT 13 04/03/2019 1403    BILITOT 0.6 04/03/2019 1403    ESTGFRAFRICA 85 04/03/2019 1403    EGFRNONAA 73 04/03/2019 1403          Lab Results   Component Value Date    CHOL 151 04/03/2019    CHOL 195 05/21/2018    CHOL 138 09/22/2010     Lab Results   Component Value Date    HDL 43 (L) 04/03/2019    HDL 32 (L)  05/21/2018    HDL 33 (L) 09/22/2010     Lab Results   Component Value Date    LDLCALC 75 04/03/2019    LDLCALC 121 (H) 05/21/2018    LDLCALC 71.8 09/22/2010     Lab Results   Component Value Date    TRIG 248 (H) 04/03/2019    TRIG 271 (H) 05/21/2018    TRIG 166 (H) 09/22/2010     Lab Results   Component Value Date    CHOLHDL 3.5 04/03/2019    CHOLHDL 6.1 (H) 05/21/2018    CHOLHDL 23.9 09/22/2010       Lab Results   Component Value Date    MICALBCREAT 11 04/05/2019     Lab Results   Component Value Date    TSH 2.40 04/03/2019       CrCl cannot be calculated (Patient's most recent lab result is older than the maximum 7 days allowed.).    No results found for: [id number]      PHYSICAL EXAMINATION  Constitutional: Appears well, no distress  Neck: Supple, trachea midline; no thyromegaly or nodules.   Respiratory: CTA, even and unlabored.  Cardiovascular: RRR, no murmurs, no carotid bruits. DP pulses  1+ bilaterally; no edema.    GI: bowel sounds active, no hernia noted  Skin: warm and dry; no lipohypertrophy, or acanthosis nigracans observed.  Neuro: DTR 2+ BUE/1+BLE. Previously, no loss of protective sensation via 10 gm monofilament or vibratory exam bilaterally.   Feet: appropriate footwear.      CGMS DOWNLOAD: See media file for details. Most glucoses are within normal range. Hypoglycemia noted overnight and throughout the day. Multiple gaps in data tracing.  Average glucose: 124  Above goal: 11%  Within goal: 78%  Below goal: 11%    A1c target < 7%      Assessment/Plan  1. Type 2 diabetes mellitus with retinopathy of both eyes, with long-term current use of insulin, macular edema presence unspecified, unspecified retinopathy severity  -- A1c this week at [facility]. + Hypoglycemia noted.  -- decrease Lantus to 38 units QHS. If fasting blood sugars are persistently > 130, increase dose to 42 units.   -- continue metformin, Ozempic at current doses.  -- BG monitoring per [employer].   -- advised pt to scan CGMS at  minimum every 8 hours. Do fingerstick when treating hypoglycemia.      -- Discussed diagnosis of DM, A1c goals, progression of disease, long term complications and tx options.    -- Reviewed hypoglycemia management: treat with 1/2 glass of juice, 1/2 can regular coke, or 4 glucose tablets. Monitor and repeat treatment every 15 minutes until BG is >70 Then have a snack, which includes a complex carbohydrate and protein.   Advised patient to check BG before activities, such as driving or exercise.    -- takes statin, ACE-I, Xarelto     2. Essential hypertension  -- controlled  -- continue Hyzaar   3. Hyperlipidemia, unspecified hyperlipidemia type  -- uncontrolled. No back on atorvastatin  -- lipid panel with RTC   4. 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        FOLLOW UP  Follow up in about 3 months (around 1/24/2020).   Patient instructed to bring BG logs to each follow up   Patient encouraged to call for any BG/medication issues, concerns, or questions.      Labs at [facility]:  Orders Placed This Encounter   Procedures    Hemoglobin A1c    Comprehensive metabolic panel    Microalbumin/creatinine urine ratio    Lipid panel

## 2019-10-25 ENCOUNTER — TELEPHONE (OUTPATIENT)
Dept: ENDOCRINOLOGY | Facility: CLINIC | Age: 64
End: 2019-10-25

## 2019-10-25 LAB
BUN SERPL-MCNC: 24 MG/DL (ref 7–25)
BUN/CREAT SERPL: 21 (CALC) (ref 6–22)
CALCIUM SERPL-MCNC: 8.5 MG/DL (ref 8.6–10.4)
CHLORIDE SERPL-SCNC: 100 MMOL/L (ref 98–110)
CO2 SERPL-SCNC: 25 MMOL/L (ref 20–32)
CREAT SERPL-MCNC: 1.15 MG/DL (ref 0.5–0.99)
GFRSERPLBLD MDRD-ARVRAT: 50 ML/MIN/1.73M2
GLUCOSE SERPL-MCNC: 131 MG/DL (ref 65–139)
HBA1C MFR BLD: 6.4 % OF TOTAL HGB
POTASSIUM SERPL-SCNC: 4.3 MMOL/L (ref 3.5–5.3)
SODIUM SERPL-SCNC: 138 MMOL/L (ref 135–146)

## 2019-10-25 NOTE — TELEPHONE ENCOUNTER
Reviewed lab results. Please call patient and notify her that A1c is stable at 6.4%. Her creatinine increased to 1.15 (a measure of kidney function). We will be repeating her kidney function with her return. No intervention is necessary, just something to continue monitoring.

## 2020-01-17 LAB
ALBUMIN SERPL-MCNC: 4.1 G/DL (ref 3.6–5.1)
ALBUMIN/CREAT UR: 3 MCG/MG CREAT
ALBUMIN/GLOB SERPL: 1.6 (CALC) (ref 1–2.5)
ALP SERPL-CCNC: 78 U/L (ref 33–130)
ALT SERPL-CCNC: 12 U/L (ref 6–29)
AST SERPL-CCNC: 14 U/L (ref 10–35)
BILIRUB SERPL-MCNC: 0.4 MG/DL (ref 0.2–1.2)
BUN SERPL-MCNC: 16 MG/DL (ref 7–25)
BUN/CREAT SERPL: 16 (CALC) (ref 6–22)
CALCIUM SERPL-MCNC: 9.3 MG/DL (ref 8.6–10.4)
CHLORIDE SERPL-SCNC: 102 MMOL/L (ref 98–110)
CHOLEST SERPL-MCNC: 121 MG/DL
CHOLEST/HDLC SERPL: 3 (CALC)
CO2 SERPL-SCNC: 26 MMOL/L (ref 20–32)
CREAT SERPL-MCNC: 1 MG/DL (ref 0.5–0.99)
CREAT UR-MCNC: 92 MG/DL (ref 20–275)
GFRSERPLBLD MDRD-ARVRAT: 59 ML/MIN/1.73M2
GLOBULIN SER CALC-MCNC: 2.5 G/DL (CALC) (ref 1.9–3.7)
GLUCOSE SERPL-MCNC: 140 MG/DL (ref 65–99)
HBA1C MFR BLD: 6.4 % OF TOTAL HGB
HDLC SERPL-MCNC: 40 MG/DL
LDLC SERPL CALC-MCNC: 54 MG/DL (CALC)
MICROALBUMIN UR-MCNC: 0.3 MG/DL
NONHDLC SERPL-MCNC: 81 MG/DL (CALC)
POTASSIUM SERPL-SCNC: 4.2 MMOL/L (ref 3.5–5.3)
PROT SERPL-MCNC: 6.6 G/DL (ref 6.1–8.1)
SODIUM SERPL-SCNC: 138 MMOL/L (ref 135–146)
TRIGL SERPL-MCNC: 203 MG/DL

## 2020-03-03 ENCOUNTER — PATIENT OUTREACH (OUTPATIENT)
Dept: ADMINISTRATIVE | Facility: OTHER | Age: 65
End: 2020-03-03

## 2020-03-05 ENCOUNTER — OFFICE VISIT (OUTPATIENT)
Dept: FAMILY MEDICINE | Facility: CLINIC | Age: 65
End: 2020-03-05
Payer: MEDICARE

## 2020-03-05 ENCOUNTER — OFFICE VISIT (OUTPATIENT)
Dept: CARDIOLOGY | Facility: CLINIC | Age: 65
End: 2020-03-05
Payer: MEDICARE

## 2020-03-05 VITALS
HEIGHT: 62 IN | HEART RATE: 101 BPM | SYSTOLIC BLOOD PRESSURE: 134 MMHG | WEIGHT: 171.31 LBS | OXYGEN SATURATION: 99 % | DIASTOLIC BLOOD PRESSURE: 79 MMHG | BODY MASS INDEX: 31.52 KG/M2

## 2020-03-05 VITALS
HEIGHT: 62 IN | SYSTOLIC BLOOD PRESSURE: 134 MMHG | BODY MASS INDEX: 31.6 KG/M2 | WEIGHT: 171.75 LBS | DIASTOLIC BLOOD PRESSURE: 79 MMHG

## 2020-03-05 DIAGNOSIS — L60.2 HYPERTROPHIC TOENAIL: ICD-10-CM

## 2020-03-05 DIAGNOSIS — I48.0 PAROXYSMAL ATRIAL FIBRILLATION: ICD-10-CM

## 2020-03-05 DIAGNOSIS — E78.2 MIXED HYPERLIPIDEMIA: ICD-10-CM

## 2020-03-05 DIAGNOSIS — I10 ESSENTIAL HYPERTENSION: ICD-10-CM

## 2020-03-05 DIAGNOSIS — K21.9 GASTROESOPHAGEAL REFLUX DISEASE WITHOUT ESOPHAGITIS: ICD-10-CM

## 2020-03-05 DIAGNOSIS — E11.319 TYPE 2 DIABETES MELLITUS WITH BOTH EYES AFFECTED BY RETINOPATHY WITHOUT MACULAR EDEMA, WITH LONG-TERM CURRENT USE OF INSULIN, UNSPECIFIED RETINOPATHY SEVERITY: Primary | ICD-10-CM

## 2020-03-05 DIAGNOSIS — Z79.4 TYPE 2 DIABETES MELLITUS WITH BOTH EYES AFFECTED BY RETINOPATHY WITHOUT MACULAR EDEMA, WITH LONG-TERM CURRENT USE OF INSULIN, UNSPECIFIED RETINOPATHY SEVERITY: Primary | ICD-10-CM

## 2020-03-05 DIAGNOSIS — L30.9 ECZEMA, UNSPECIFIED TYPE: ICD-10-CM

## 2020-03-05 DIAGNOSIS — I48.0 PAROXYSMAL ATRIAL FIBRILLATION: Primary | ICD-10-CM

## 2020-03-05 PROCEDURE — 99214 PR OFFICE/OUTPT VISIT, EST, LEVL IV, 30-39 MIN: ICD-10-PCS | Mod: S$PBB,,, | Performed by: FAMILY MEDICINE

## 2020-03-05 PROCEDURE — 99999 PR PBB SHADOW E&M-EST. PATIENT-LVL III: ICD-10-PCS | Mod: PBBFAC,,, | Performed by: FAMILY MEDICINE

## 2020-03-05 PROCEDURE — 99999 PR PBB SHADOW E&M-EST. PATIENT-LVL III: ICD-10-PCS | Mod: PBBFAC,,, | Performed by: INTERNAL MEDICINE

## 2020-03-05 PROCEDURE — 99214 PR OFFICE/OUTPT VISIT, EST, LEVL IV, 30-39 MIN: ICD-10-PCS | Mod: S$PBB,,, | Performed by: INTERNAL MEDICINE

## 2020-03-05 PROCEDURE — 99214 OFFICE O/P EST MOD 30 MIN: CPT | Mod: S$PBB,,, | Performed by: FAMILY MEDICINE

## 2020-03-05 PROCEDURE — 99214 OFFICE O/P EST MOD 30 MIN: CPT | Mod: S$PBB,,, | Performed by: INTERNAL MEDICINE

## 2020-03-05 PROCEDURE — 99213 OFFICE O/P EST LOW 20 MIN: CPT | Mod: PBBFAC,27,PO | Performed by: INTERNAL MEDICINE

## 2020-03-05 PROCEDURE — 99213 OFFICE O/P EST LOW 20 MIN: CPT | Mod: PBBFAC,PO | Performed by: FAMILY MEDICINE

## 2020-03-05 PROCEDURE — 99999 PR PBB SHADOW E&M-EST. PATIENT-LVL III: CPT | Mod: PBBFAC,,, | Performed by: FAMILY MEDICINE

## 2020-03-05 PROCEDURE — 99999 PR PBB SHADOW E&M-EST. PATIENT-LVL III: CPT | Mod: PBBFAC,,, | Performed by: INTERNAL MEDICINE

## 2020-03-05 RX ORDER — FAMOTIDINE 40 MG/1
40 TABLET, FILM COATED ORAL DAILY
Qty: 90 TABLET | Refills: 1 | Status: SHIPPED | OUTPATIENT
Start: 2020-03-05 | End: 2020-08-13

## 2020-03-05 RX ORDER — CLOTRIMAZOLE AND BETAMETHASONE DIPROPIONATE 10; .64 MG/G; MG/G
CREAM TOPICAL 2 TIMES DAILY
Qty: 45 G | Refills: 2 | Status: SHIPPED | OUTPATIENT
Start: 2020-03-05 | End: 2021-06-08 | Stop reason: SDUPTHER

## 2020-03-05 NOTE — PROGRESS NOTES
"Subjective:       Patient ID: Jeanine Chandler is a 64 y.o. female.    Chief Complaint: Follow-up (6 month)    Pt is known to me.  Pt is here for followup of chronic medical issues.  The pt is seeing Jasmyn Mayo--her recent HgA1c is 6.4.  She sees Dr. Mccarty later today for her a fib.  The pt has been feeling well.  She woke up this morning with a sore behind her right ear.  The pt has lost 9 pounds since her last visit with me.    Review of Systems   Constitutional: Negative for activity change, appetite change and unexpected weight change.   Eyes: Negative for visual disturbance.   Respiratory: Negative for cough, chest tightness and shortness of breath.    Cardiovascular: Negative for chest pain, palpitations and leg swelling.   Gastrointestinal: Negative for abdominal pain, constipation, diarrhea, nausea and vomiting.   Endocrine: Negative for cold intolerance, heat intolerance and polyuria.   Genitourinary: Negative for decreased urine volume and dysuria.   Musculoskeletal: Negative for arthralgias and back pain.   Skin: Negative for rash.        She has difficulty cutting her toenails   Neurological: Negative for numbness and headaches.   Psychiatric/Behavioral: Negative for dysphoric mood and sleep disturbance. The patient is not nervous/anxious.        Objective:       Vitals:    03/05/20 1001   BP: 134/79   BP Location: Right arm   Patient Position: Sitting   BP Method: Medium (Manual)   Pulse: 101   SpO2: 99%   Weight: 77.7 kg (171 lb 4.8 oz)   Height: 5' 1.5" (1.562 m)     Physical Exam   Constitutional: She is oriented to person, place, and time. She appears well-developed and well-nourished.   Pt is obese   HENT:   Head: Normocephalic.   Eyes: Pupils are equal, round, and reactive to light. Conjunctivae and EOM are normal.   Neck: Normal range of motion. Neck supple. No thyromegaly present.   Cardiovascular: Normal rate and normal heart sounds.   Pulses:       Dorsalis pedis pulses are 2+ on the " right side, and 2+ on the left side.        Posterior tibial pulses are 2+ on the right side, and 2+ on the left side.   irreg irregular   Pulmonary/Chest: Effort normal and breath sounds normal.   Abdominal: Soft. Bowel sounds are normal. There is no tenderness.   Musculoskeletal: Normal range of motion. She exhibits no tenderness or deformity.        Right foot: There is normal range of motion and no deformity.        Left foot: There is normal range of motion and no deformity.   Feet:   Right Foot:   Protective Sensation: 7 sites tested. 7 sites sensed.   Skin Integrity: Negative for ulcer.   Left Foot:   Protective Sensation: 7 sites tested. 7 sites sensed.   Skin Integrity: Negative for ulcer.   Lymphadenopathy:     She has no cervical adenopathy.   Neurological: She is alert and oriented to person, place, and time. She displays normal reflexes. No cranial nerve deficit. She exhibits normal muscle tone. Coordination normal.   Skin: Skin is warm and dry. Rash (red scaly behind right ear) noted.   Psychiatric: She has a normal mood and affect. Her behavior is normal.       Assessment:       1. Type 2 diabetes mellitus with both eyes affected by retinopathy without macular edema, with long-term current use of insulin, unspecified retinopathy severity    2. Gastroesophageal reflux disease without esophagitis    3. Essential hypertension    4. Mixed hyperlipidemia    5. Paroxysmal atrial fibrillation    6. Eczema, unspecified type    7. Hypertrophic toenail        Plan:       Jeanine was seen today for follow-up.    Diagnoses and all orders for this visit:    Type 2 diabetes mellitus with both eyes affected by retinopathy without macular edema, with long-term current use of insulin, unspecified retinopathy severity    Gastroesophageal reflux disease without esophagitis  -     famotidine (PEPCID) 40 MG tablet; Take 1 tablet (40 mg total) by mouth once daily.    Essential hypertension    Mixed  hyperlipidemia    Paroxysmal atrial fibrillation    Eczema, unspecified type  -     clotrimazole-betamethasone 1-0.05% (LOTRISONE) cream; Apply topically 2 (two) times daily.    Hypertrophic toenail  -     Ambulatory referral/consult to Podiatry; Future      During this visit, I reviewed the pt's history, medications, allergies, and problem list.

## 2020-03-05 NOTE — PROGRESS NOTES
"Subjective:    Patient ID:  Jeanine Chandler is a 64 y.o. female who presents for follow-up of Atrial Fibrillation (6 month f/u - reassess symptoms.  Atorvastatin increased to 40mg last visit.  Pt denies any side effects or symptoms - doing well. ); Hypertension; and Hyperlipidemia      Problem List Items Addressed This Visit        Cardiac/Vascular    Essential hypertension    Mixed hyperlipidemia    Paroxysmal atrial fibrillation - Primary          HPI    Patient was last seen on 07/18/2019 at which time she was evaluated for paroxysmal atrial fibrillation and an echocardiogram was ordered at that time.  Echocardiogram showed preserved ejection fraction without acute abnormalities.  She was continued on her beta-blocker, Xarelto, and her cholesterol medication was optimized.  She presents today for follow-up.    On assessment today, the patient states that she feels well today, no major complaints.    No palpitations.    Tolerating atorvastatin well.  No bleeding on Xarelto.    Review of Systems   Constitution: Negative for decreased appetite, fever and malaise/fatigue.   Eyes: Negative for blurred vision.   Cardiovascular: Negative for chest pain, dyspnea on exertion, irregular heartbeat and leg swelling.   Respiratory: Negative for cough, hemoptysis, shortness of breath and wheezing.    Endocrine: Negative for cold intolerance and heat intolerance.   Hematologic/Lymphatic: Negative for bleeding problem.   Musculoskeletal: Negative for muscle weakness and myalgias.   Gastrointestinal: Negative for abdominal pain, constipation and diarrhea.   Genitourinary: Negative for bladder incontinence.   Neurological: Negative for dizziness and weakness.   Psychiatric/Behavioral: Negative for depression.        Objective:     Vitals:    03/05/20 1132   BP: 134/79   BP Location: Right arm   Patient Position: Sitting   BP Method: Medium (Manual)   Weight: 77.9 kg (171 lb 11.8 oz)   Height: 5' 1.5" (1.562 m)        Physical " Exam   Constitutional: She is oriented to person, place, and time. She appears well-developed and well-nourished. No distress.   HENT:   Head: Normocephalic and atraumatic.   Neck: Neck supple. No JVD present.   Cardiovascular: Normal rate, regular rhythm and normal heart sounds. Exam reveals no gallop and no friction rub.   No murmur heard.  Pulmonary/Chest: Effort normal and breath sounds normal. No respiratory distress. She has no wheezes. She has no rales.   Abdominal: Soft. Bowel sounds are normal. There is no tenderness. There is no rebound and no guarding.   Musculoskeletal: She exhibits no edema or tenderness.   Neurological: She is alert and oriented to person, place, and time.   Skin: Skin is warm and dry.   Psychiatric: Her behavior is normal.           Current Outpatient Medications on File Prior to Visit   Medication Sig    albuterol (PROAIR HFA) 90 mcg/actuation inhaler Inhale 2 puffs into the lungs every 4 (four) hours as needed. 2 HFA Aerosol Inhaler Inhalation Every 4-6 hours    atorvastatin (LIPITOR) 40 MG tablet Take 1 tablet (40 mg total) by mouth once daily.    blood sugar diagnostic (CONTOUR TEST STRIPS) Strp Test 4 x daily    blood sugar diagnostic Strp To check BG 3 times daily, to use with insurance preferred meter    blood-glucose meter kit To check BG 3 times daily, to use with insurance preferred meter    clotrimazole-betamethasone 1-0.05% (LOTRISONE) cream Apply topically 2 (two) times daily.    epinephrine (EPIPEN) 0.3 mg/0.3 mL (1:1,000) AtIn Inject 0.3 mLs (0.3 mg total) into the muscle once. Pen Injector Intramuscular .  As directed for anaphylaxis PRN    escitalopram oxalate (LEXAPRO) 20 MG tablet Take 1 tablet (20 mg total) by mouth once daily.    famotidine (PEPCID) 40 MG tablet Take 1 tablet (40 mg total) by mouth once daily.    fexofenadine (ALLEGRA) 180 MG tablet Take 180 mg by mouth once daily.    flash glucose scanning reader (Metamarkets HECTOR 14 DAY READER) [facility] 1  "each by Misc.(Non-Drug; Combo Route) route continuous.    flash glucose sensor (FREESTYLE HECTOR 14 DAY SENSOR) Kit Us to check blood sugar continuously  Change sensor every 14 days    fluticasone propionate (FLOVENT HFA) 220 mcg/actuation inhaler Inhale 1 puff into the lungs 2 (two) times daily. Controller    insulin (LANTUS SOLOSTAR U-100 INSULIN) glargine 100 units/mL (3mL) SubQ pen Inject 38 Units into the skin once daily.    lancets [facility] To check BG 3 times daily, to use with insurance preferred meter. Dispense with appropriate lancing device    losartan-hydrochlorothiazide 50-12.5 mg (HYZAAR) 50-12.5 mg per tablet Take 1 tablet by mouth once daily.    metFORMIN (GLUCOPHAGE) 1000 MG tablet Take 1 tablet (1,000 mg total) by mouth 2 (two) times daily with meals.    metoprolol succinate (TOPROL-XL) 100 MG 24 hr tablet Take 1 tablet (100 mg total) by mouth once daily.    pen needle, diabetic (BD ULTRA-FINE SHORT PEN NEEDLE) 31 gauge x 5/16" Ndle USE WITH INSULIN EVERY EVENING AS DIRECTED    rivaroxaban (XARELTO) 20 mg Tab Take 1 tablet (20 mg total) by mouth daily with dinner or evening meal.    semaglutide (OZEMPIC) 0.25 mg or 0.5 mg(2 mg/1.5 mL) PnIj Inject 0.5 mg into the skin every 7 days.    vitamin D (VITAMIN D3) 1000 units Tab Take 2,000 Units by mouth once daily.    [DISCONTINUED] ranitidine (ZANTAC) 150 MG tablet Take 1 tablet (150 mg total) by mouth nightly.     No current facility-administered medications on file prior to visit.        Lipid Panel:   Lab Results   Component Value Date    CHOL 121 01/16/2020    HDL 40 (L) 01/16/2020    LDLCALC 54 01/16/2020    TRIG 203 (H) 01/16/2020    CHOLHDL 3.0 01/16/2020       The ASCVD Risk score (Suzicarmina ARAGON Jr., et al., 2013) failed to calculate for the following reasons:    The valid total cholesterol range is 130 to 320 mg/dL    All pertinent labs, imaging, and EKGs reviewed.    Assessment:       1. Paroxysmal atrial fibrillation    2. Essential " hypertension    3. Mixed hyperlipidemia         Plan:     Symptoms OK today  BP OK today  Currently in aFib  Will pursue rate control strategy at this time considering asymptomatic paroxysmal aFib    Continue Xarelto 20 mg PO daily.  Emphasized to the patient to take the medication with the largest meal of the day.     Continue metoprolol succinate 100 mg PO Daily   Continue atorvastatin to 40 mg PO Daily   24 Hour Holter monitor to evaluate average HR    Continue other cardiac medications  Mediterranean Diet/Cardiovascular Exercise Program    Patient queried and all questions were answered.    F/u in 6 months      Signed:    Lul Wright MD  3/5/2020 9:08 AM

## 2020-03-23 ENCOUNTER — TELEPHONE (OUTPATIENT)
Dept: CARDIOLOGY | Facility: CLINIC | Age: 65
End: 2020-03-23

## 2020-03-23 NOTE — TELEPHONE ENCOUNTER
----- Message from Matthew Golden sent at 3/23/2020  2:42 PM CDT -----  Type:  Patient Returning Call    Who Called:  Patient  Who Left Message for Patient:  NA  Does the patient know what this is regarding?: Rescheduling  Best Call Back Number: [phone]    Additional Information:

## 2020-03-24 ENCOUNTER — TELEPHONE (OUTPATIENT)
Dept: ENDOCRINOLOGY | Facility: CLINIC | Age: 65
End: 2020-03-24

## 2020-03-24 DIAGNOSIS — E11.319 TYPE 2 DIABETES MELLITUS WITH BOTH EYES AFFECTED BY RETINOPATHY WITHOUT MACULAR EDEMA, WITH LONG-TERM CURRENT USE OF INSULIN, UNSPECIFIED RETINOPATHY SEVERITY: Primary | ICD-10-CM

## 2020-03-24 DIAGNOSIS — Z79.4 TYPE 2 DIABETES MELLITUS WITH BOTH EYES AFFECTED BY RETINOPATHY WITHOUT MACULAR EDEMA, WITH LONG-TERM CURRENT USE OF INSULIN, UNSPECIFIED RETINOPATHY SEVERITY: Primary | ICD-10-CM

## 2020-03-24 NOTE — TELEPHONE ENCOUNTER
Pt asking for rx levemir to replace her other insulin. Her pharmacy doesn't cover it. Send it to [facility] in [facility]

## 2020-04-01 DIAGNOSIS — Z79.4 TYPE 2 DIABETES MELLITUS WITH BOTH EYES AFFECTED BY RETINOPATHY WITHOUT MACULAR EDEMA, WITH LONG-TERM CURRENT USE OF INSULIN, UNSPECIFIED RETINOPATHY SEVERITY: Primary | ICD-10-CM

## 2020-04-01 DIAGNOSIS — E11.319 TYPE 2 DIABETES MELLITUS WITH BOTH EYES AFFECTED BY RETINOPATHY WITHOUT MACULAR EDEMA, WITH LONG-TERM CURRENT USE OF INSULIN, UNSPECIFIED RETINOPATHY SEVERITY: Primary | ICD-10-CM

## 2020-04-17 ENCOUNTER — TELEPHONE (OUTPATIENT)
Dept: ENDOCRINOLOGY | Facility: CLINIC | Age: 65
End: 2020-04-17

## 2020-04-17 DIAGNOSIS — Z79.4 TYPE 2 DIABETES MELLITUS WITH BOTH EYES AFFECTED BY RETINOPATHY WITHOUT MACULAR EDEMA, WITH LONG-TERM CURRENT USE OF INSULIN, UNSPECIFIED RETINOPATHY SEVERITY: ICD-10-CM

## 2020-04-17 DIAGNOSIS — E11.319 TYPE 2 DIABETES MELLITUS WITH BOTH EYES AFFECTED BY RETINOPATHY WITHOUT MACULAR EDEMA, WITH LONG-TERM CURRENT USE OF INSULIN, UNSPECIFIED RETINOPATHY SEVERITY: ICD-10-CM

## 2020-04-17 NOTE — TELEPHONE ENCOUNTER
Received BG logs. Please call patient and advise her to decrease Levenir dose from 38 units to 34 units. Blood sugars look great but with some borderline hypoglycemia in the 70s. Thank you!

## 2020-04-20 ENCOUNTER — TELEPHONE (OUTPATIENT)
Dept: ENDOCRINOLOGY | Facility: CLINIC | Age: 65
End: 2020-04-20

## 2020-04-20 NOTE — TELEPHONE ENCOUNTER
Notified ptCarlos Carrion was sent again on 4/17/20. Pt. Verbalized understanding and states will call pharmacy to verified they got it

## 2020-04-20 NOTE — TELEPHONE ENCOUNTER
----- Message from Zain Carrillo sent at 4/20/2020  3:28 PM CDT -----  Contact: pt  Type: Needs Medical Advice    Who Called:  pt    Best Call Back Number: [phone]  Additional Information: pt needs to discuss the medication LEVEMIR FLEXTOUCH. States she is changing to that medication and that he needs to be called into the pharmacy. Please call to advise.    [facility]/pharmacy #[id number] - [place] - [address] AT [place] [address] [address] [address]  Phone: [phone] Fax: [phone]

## 2020-05-07 ENCOUNTER — TELEPHONE (OUTPATIENT)
Dept: ENDOCRINOLOGY | Facility: CLINIC | Age: 65
End: 2020-05-07

## 2020-05-07 NOTE — TELEPHONE ENCOUNTER
Please call and notify patient that we received dexcom order form today. The DME company apparently faxed to the wrong department at the [facility] location. Please let her know that we have faxed it off today.

## 2020-05-11 ENCOUNTER — TELEPHONE (OUTPATIENT)
Dept: ENDOCRINOLOGY | Facility: CLINIC | Age: 65
End: 2020-05-11

## 2020-05-11 NOTE — TELEPHONE ENCOUNTER
----- Message from Lucy Riggs sent at 5/11/2020  4:37 PM CDT -----  Contact: pt   Pt needing a call back     Pt contact# [phone]

## 2020-05-11 NOTE — TELEPHONE ENCOUNTER
S/w pt. Wanted to get in sooner to see EMMANUEL Muller, states medicare won't pay for Dexcom until pt has been seen. Pt scheduled for audio visit. Verbalized understanding.

## 2020-05-12 ENCOUNTER — PATIENT OUTREACH (OUTPATIENT)
Dept: ADMINISTRATIVE | Facility: OTHER | Age: 65
End: 2020-05-12

## 2020-05-12 ENCOUNTER — OFFICE VISIT (OUTPATIENT)
Dept: ENDOCRINOLOGY | Facility: CLINIC | Age: 65
End: 2020-05-12
Payer: MEDICARE

## 2020-05-12 ENCOUNTER — TELEPHONE (OUTPATIENT)
Dept: ENDOCRINOLOGY | Facility: CLINIC | Age: 65
End: 2020-05-12

## 2020-05-12 DIAGNOSIS — Z79.4 TYPE 2 DIABETES MELLITUS WITH BOTH EYES AFFECTED BY RETINOPATHY WITHOUT MACULAR EDEMA, WITH LONG-TERM CURRENT USE OF INSULIN, UNSPECIFIED RETINOPATHY SEVERITY: Primary | ICD-10-CM

## 2020-05-12 DIAGNOSIS — E11.649 HYPOGLYCEMIA UNAWARENESS ASSOCIATED WITH TYPE 2 DIABETES MELLITUS: ICD-10-CM

## 2020-05-12 DIAGNOSIS — I10 ESSENTIAL HYPERTENSION: ICD-10-CM

## 2020-05-12 DIAGNOSIS — E78.2 MIXED HYPERLIPIDEMIA: ICD-10-CM

## 2020-05-12 DIAGNOSIS — E11.319 TYPE 2 DIABETES MELLITUS WITH BOTH EYES AFFECTED BY RETINOPATHY WITHOUT MACULAR EDEMA, WITH LONG-TERM CURRENT USE OF INSULIN, UNSPECIFIED RETINOPATHY SEVERITY: Primary | ICD-10-CM

## 2020-05-12 PROCEDURE — 99442 PR PHYSICIAN TELEPHONE EVALUATION 11-20 MIN: CPT | Mod: 95,,, | Performed by: NURSE PRACTITIONER

## 2020-05-12 PROCEDURE — 99442 PR PHYSICIAN TELEPHONE EVALUATION 11-20 MIN: ICD-10-PCS | Mod: 95,,, | Performed by: NURSE PRACTITIONER

## 2020-05-12 NOTE — TELEPHONE ENCOUNTER
Called x2 and unable to reach pt to schedule appt for 3 mo f/u with fasting labs    Did fax chart note and printed Dexcom prescriptions to [facility] at [facility] at [phone]

## 2020-05-12 NOTE — PROGRESS NOTES
Chart reviewed.   Immunizations: Triggered Imm Registry     Orders placed: A1c   Upcoming appts to satisfy LONNY topics: n/a

## 2020-05-12 NOTE — PROGRESS NOTES
Established Patient - Audio Only Telehealth Visit     The patient location is: Patient's home  The chief complaint leading to consultation is: Type 2 DM  Visit type: Virtual visit with audio only (telephone)  Total time spent with patient: 12 min       The reason for the audio only service rather than synchronous audio and video virtual visit was related to technical difficulties or patient preference/necessity.     Each patient to whom I provide medical services by telemedicine is:  (1) informed of the relationship between the physician and patient and the respective role of any other health care provider with respect to management of the patient; and (2) notified that they may decline to receive medical services by telemedicine and may withdraw from such care at any time. Patient verbally consented to receive this service via voice-only telephone call.       This service was not originating from a related E/M service provided within the previous 7 days nor will  to an E/M service or procedure within the next 24 hours or my soonest available appointment.  Prevailing standard of care was able to be met in this audio-only visit.        CC: Ms. Jeanine Chandler arrives today for management of Type 2 DM and review of chronic medical conditions, as listed in the Visit Diagnosis section of this encounter.       HPI: Ms. Jeanine Chandler was diagnosed with Type 2 DM in her late 40s. She was diagnosed based on lab work. Initial treatment consisted of metformin and insulin was added a few years later. + FH of DM in mother and sister. Denies hospitalizations due to DM.     This is a telephone visit, due to appointment restrictions related to COVID-19 and patient's inability to do video visit. Labs will not be scheduled at this time, due to patient's preference for isolation during COVID-19 pandemic.     Patient was last seen by me in October. Lantus dose was decreased at this time, due to hypoglycemia.     Her  insurance no longer covers Freestyle Krupa, as Dexcom is preferred. She states that she has pharmacy benefit through her 's work.      Her DME company is waiting on today's encounter note before dispensing Dexcom.    Hypoglycemia: No    Missing Insulin/PO medication doses: No    Exercise: No    Dietary Habits: Eats 3 meals/day. May snack on piece of fruit. Avoids sugary beverages.     Last DM education appointment: 4/9/2019      CURRENT DIABETIC MEDS: metformin 1000 mg BID, Ozempic 0.5 mg weekly, Lantus 34 units QHS (will be changing to Levemir once out of Lantus supply).  Vial or pen: pen  Glucometer type: unsure    Previous DM treatments:  Tonyuvia    Last Eye Exam: January 2019. Pt reports retinopathy. Can't recall practice or provider's name (believes she saw a retina specialist).   Last Podiatry Exam: n/a    REVIEW OF SYSTEMS  Constitutional: no c/o weakness, fatigue, weight loss.   Eyes: denies visual disturbances.  Cardiac: no palpitations or chest pain.  Respiratory: no dyspnea, cough  GI: no c/o abdominal pain or nausea. Denies h/o pancreatitis.  Skin: no lesions or rashes.   Neuro: no numbness, tingling, or parasthesias.  Endocrine: denies polyphagia, polydipsia, polyuria      Personally reviewed Past Medical, Surgical, Social History.    Vital Signs  There were no vitals taken for this visit. - telephone visit    Personally reviewed the below labs:    Hemoglobin A1C   Date Value Ref Range Status   01/16/2020 6.4 (H) <5.7 % of total Hgb Final     Comment:     For someone without known diabetes, a hemoglobin   A1c value between 5.7% and 6.4% is consistent with  prediabetes and should be confirmed with a   follow-up test.     For someone with known diabetes, a value <7%  indicates that their diabetes is well controlled. A1c  targets should be individualized based on duration of  diabetes, age, comorbid conditions, and other  considerations.     This assay result is consistent with an increased risk  of  diabetes.     Currently, no consensus exists regarding use of  hemoglobin A1c for diagnosis of diabetes for children.        10/24/2019 6.4 (H) <5.7 % of total Hgb Final     Comment:     For someone without known diabetes, a hemoglobin   A1c value between 5.7% and 6.4% is consistent with  prediabetes and should be confirmed with a   follow-up test.     For someone with known diabetes, a value <7%  indicates that their diabetes is well controlled. A1c  targets should be individualized based on duration of  diabetes, age, comorbid conditions, and other  considerations.     This assay result is consistent with an increased risk  of diabetes.     Currently, no consensus exists regarding use of  hemoglobin A1c for diagnosis of diabetes for children.        07/01/2019 7.1 (H) <5.7 % of total Hgb Final     Comment:     For someone without known diabetes, a hemoglobin A1c  value of 6.5% or greater indicates that they may have   diabetes and this should be confirmed with a follow-up   test.     For someone with known diabetes, a value <7% indicates   that their diabetes is well controlled and a value   greater than or equal to 7% indicates suboptimal   control. A1c targets should be individualized based on   duration of diabetes, age, comorbid conditions, and   other considerations.     Currently, no consensus exists regarding use of  hemoglobin A1c for diagnosis of diabetes for children.             Chemistry        Component Value Date/Time     01/16/2020 1106    K 4.2 01/16/2020 1106     01/16/2020 1106    CO2 26 01/16/2020 1106    BUN 16 01/16/2020 1106    CREATININE 1.00 (H) 01/16/2020 1106     (H) 01/16/2020 1106        Component Value Date/Time    CALCIUM 9.3 01/16/2020 1106    ALKPHOS 78 01/16/2020 1106    AST 14 01/16/2020 1106    ALT 12 01/16/2020 1106    BILITOT 0.4 01/16/2020 1106    ESTGFRAFRICA 68 01/16/2020 1106    EGFRNONAA 59 (L) 01/16/2020 1106          Lab Results   Component Value Date     CHOL 121 01/16/2020    CHOL 151 04/03/2019    CHOL 195 05/21/2018     Lab Results   Component Value Date    HDL 40 (L) 01/16/2020    HDL 43 (L) 04/03/2019    HDL 32 (L) 05/21/2018     Lab Results   Component Value Date    LDLCALC 54 01/16/2020    LDLCALC 75 04/03/2019    LDLCALC 121 (H) 05/21/2018     Lab Results   Component Value Date    TRIG 203 (H) 01/16/2020    TRIG 248 (H) 04/03/2019    TRIG 271 (H) 05/21/2018     Lab Results   Component Value Date    CHOLHDL 3.0 01/16/2020    CHOLHDL 3.5 04/03/2019    CHOLHDL 6.1 (H) 05/21/2018       Lab Results   Component Value Date    MICALBCREAT 3 01/16/2020     Lab Results   Component Value Date    TSH 2.40 04/03/2019       CrCl cannot be calculated (Patient's most recent lab result is older than the maximum 7 days allowed.).    No results found for: [id number]      PHYSICAL EXAMINATION  Deferred - telephone visit      CGMS DOWNLOAD: unable to download today for telephone visit.     A1c target < 7%      Assessment/Plan  1. Type 2 diabetes mellitus with retinopathy of both eyes, with long-term current use of insulin, macular edema presence unspecified, unspecified retinopathy severity  -- Stable. Has history of hypoglycemia with some unawareness.   -- continue Lantus 34 units QHS.   -- continue metformin, Ozempic at current doses.  -- BG monitoring per Dexcom, once approved. I advised pt to let us know once she's received so that we can set her up with Education.   -- to notify me if hypoglycemia reoccurs.      -- Discussed diagnosis of DM, A1c goals, progression of disease, long term complications and tx options.    -- Reviewed hypoglycemia management: treat with 1/2 glass of juice, 1/2 can regular coke, or 4 glucose tablets. Monitor and repeat treatment every 15 minutes until BG is >70 Then have a snack, which includes a complex carbohydrate and protein.   Advised patient to check BG before activities, such as driving or exercise.    -- takes statin, ACE-I,  Xarelto     2. Essential hypertension  -- continue Hyzaar   3. Hyperlipidemia, unspecified hyperlipidemia type  -- uncontrolled.   -- taking atorvastatin  -- lipid panel with RTC   4. Hypoglycemia unawareness associated with Type 2 DM  -- to begin Dexcom G6 once approved.        FOLLOW UP  Follow up in about 3 months (around 8/12/2020).   Patient instructed to bring BG logs to each follow up   Patient encouraged to call for any BG/medication issues, concerns, or questions.      Orders Placed This Encounter   Procedures    Hemoglobin A1C    Comprehensive metabolic panel    Lipid Panel

## 2020-05-13 ENCOUNTER — TELEPHONE (OUTPATIENT)
Dept: ENDOCRINOLOGY | Facility: CLINIC | Age: 65
End: 2020-05-13

## 2020-05-13 NOTE — TELEPHONE ENCOUNTER
Pt informed that fax number Provided for her Dexcom supplies was not operating  Pt will locate fax number and contact us to send to diabetes supply company    Pt scheduled for 3 mo f/u on 8/19 at 9;30am with labs to be done at [facility]

## 2020-05-13 NOTE — TELEPHONE ENCOUNTER
----- Message from Lucy Riggs sent at 5/13/2020  2:40 PM CDT -----  Contact: pt   Pt sending a message  Dewayne about the fax number [phone] please put on document   att. Kindra    Pt contact #[phone]

## 2020-05-27 ENCOUNTER — TELEPHONE (OUTPATIENT)
Dept: ENDOCRINOLOGY | Facility: CLINIC | Age: 65
End: 2020-05-27

## 2020-05-27 NOTE — TELEPHONE ENCOUNTER
----- Message from Estrella Araiza sent at 5/27/2020 11:49 AM CDT -----  Contact: salinas olivares/ Levi olivares/ Levi need office notes from pt most recent visits, within the last 6 mos. Call back [phone] ext [phone]. Fax [phone]

## 2020-06-14 DIAGNOSIS — I10 ESSENTIAL HYPERTENSION: ICD-10-CM

## 2020-06-15 RX ORDER — LOSARTAN POTASSIUM AND HYDROCHLOROTHIAZIDE 12.5; 5 MG/1; MG/1
TABLET ORAL
Qty: 90 TABLET | Refills: 0 | Status: SHIPPED | OUTPATIENT
Start: 2020-06-15 | End: 2020-07-31

## 2020-06-15 NOTE — PROGRESS NOTES
Refill Authorization Note    is requesting a refill authorization.    Brief assessment and rationale for refill: APPROVE: prr  Name and strength of medication: losartan-hydrochlorothiazide 50-12.5 mg (HYZAAR) 50-12.5 mg per tablet       Medication Therapy Plan: approve 3 more months     Medication reconciliation completed: No                         Comments:      Requested Prescriptions   Signed Prescriptions Disp Refills    losartan-hydrochlorothiazide 50-12.5 mg (HYZAAR) 50-12.5 mg per tablet 90 tablet 0     Sig: TAKE 1 TABLET BY MOUTH EVERY DAY       There is no refill protocol information for this order        Blood Pressure Readings: <139/89mmHg 12 months   BP Readings from Last 3 Encounters:   03/05/20 134/79   03/05/20 134/79   10/24/19 118/74         Last Labs: 6 months: Diuretics-(Cr/K/Na)  or 12 months: non-diuretics (Cr/K)   Lab Results   Component Value Date    CREATININE 1.00 (H) 01/16/2020    CREATININE 1.15 (H) 10/24/2019    CREATININE 0.84 04/03/2019       Lab Results   Component Value Date    K 4.2 01/16/2020    K 4.3 10/24/2019    K 4.1 04/03/2019    Lab Results   Component Value Date     01/16/2020     10/24/2019     04/03/2019        Digital Hypertension Data: values will display if enrolled   Last 5 Patient Entered Readings                                      Current 30 Day Average:      There is no flowsheet data to display.           Appointments  past 12m or future 3m with PCP    Date Provider   Last Visit   3/5/2020 GLENROY Kirby MD   Next Visit   Visit date not found GLENROY Kirby MD        ED visits in. past 90 days: 0     Note composed:4:01 PM 06/15/2020

## 2020-07-21 DIAGNOSIS — Z79.4 TYPE 2 DIABETES MELLITUS WITH BOTH EYES AFFECTED BY RETINOPATHY WITHOUT MACULAR EDEMA, WITH LONG-TERM CURRENT USE OF INSULIN, UNSPECIFIED RETINOPATHY SEVERITY: ICD-10-CM

## 2020-07-21 DIAGNOSIS — E11.319 TYPE 2 DIABETES MELLITUS WITH BOTH EYES AFFECTED BY RETINOPATHY WITHOUT MACULAR EDEMA, WITH LONG-TERM CURRENT USE OF INSULIN, UNSPECIFIED RETINOPATHY SEVERITY: ICD-10-CM

## 2020-07-21 RX ORDER — METFORMIN HYDROCHLORIDE 1000 MG/1
1000 TABLET ORAL 2 TIMES DAILY WITH MEALS
Qty: 180 TABLET | Refills: 3 | Status: SHIPPED | OUTPATIENT
Start: 2020-07-21 | End: 2021-06-01 | Stop reason: SDUPTHER

## 2020-07-31 ENCOUNTER — TELEPHONE (OUTPATIENT)
Dept: ENDOCRINOLOGY | Facility: CLINIC | Age: 65
End: 2020-07-31

## 2020-07-31 NOTE — TELEPHONE ENCOUNTER
----- Message from Estrelal Muniz sent at 7/31/2020 11:49 AM CDT -----  Regarding: return call ab VV  Contact: patient  Type: Needs Medical Advice  Who Called:  patient  Best Call Back Number: [phone]  Additional Information: Patient would like to schedule VV apt and have Dewayne or staff return her call and has sent a message on Monday with no return call.  Please call to  advise. Thanks!

## 2020-08-13 DIAGNOSIS — K21.9 GASTROESOPHAGEAL REFLUX DISEASE WITHOUT ESOPHAGITIS: ICD-10-CM

## 2020-08-13 RX ORDER — FAMOTIDINE 40 MG/1
TABLET, FILM COATED ORAL
Qty: 90 TABLET | Refills: 1 | Status: SHIPPED | OUTPATIENT
Start: 2020-08-13 | End: 2020-10-31 | Stop reason: SDUPTHER

## 2020-08-13 NOTE — TELEPHONE ENCOUNTER
No new care gaps identified.  Powered by [employer]. Reference number: [id number]. 8/13/2020 12:34:57 AM   MERARYT

## 2020-08-13 NOTE — PROGRESS NOTES
Refill Authorization Note    is requesting a refill authorization.    Brief assessment and rationale for refill: APPROVE: Prr  Name and strength of medication: famotidine (PEPCID) 40 MG tablet       Medication Therapy Plan: CDMR.     Medication reconciliation completed: No                         Comments:      Orders Placed This Encounter    famotidine (PEPCID) 40 MG tablet      Requested Prescriptions   Signed Prescriptions Disp Refills    famotidine (PEPCID) 40 MG tablet 90 tablet 1     Sig: TAKE 1 TABLET BY MOUTH EVERY DAY       Gastroenterology: H2 Antagonists - famotidine Passed - 8/13/2020 12:34 AM        Passed - Patient is at least 18 years old        Passed - Office visit in past 12 months or future 90 days.     Recent Outpatient Visits            3 months ago Type 2 diabetes mellitus with both eyes affected by retinopathy without macular edema, with long-term current use of insulin, unspecified retinopathy severity    [facility] Approach EMMANUEL Williamson,TIEN-C    5 months ago Paroxysmal atrial fibrillation    [facility] Cardiology Lul Wright MD    5 months ago Type 2 diabetes mellitus with both eyes affected by retinopathy without macular edema, with long-term current use of insulin, unspecified retinopathy severity    [facility] Medicine GLENROY Kirby MD    9 months ago Type 2 diabetes mellitus with both eyes affected by retinopathy without macular edema, with long-term current use of insulin, unspecified retinopathy severity    [facility] Endocrinology EMMANUEL Williamson,TIEN-C    11 months ago Ductal carcinoma in situ (DCIS) of left breast    [facility] Hematology Oncology Chinmay Cam MD          Future Appointments              In 3 weeks [facility]    In 1 month EMMANUEL Williamson[facility]TIEN-C [facility]                Passed - Cr is 1.3 or below and within  360 days     Creatinine   Date Value Ref Range Status   01/16/2020 1.00 (H) 0.50 - 0.99 mg/dL Final     Comment:     For patients >49 years of age, the reference limit  for Creatinine is approximately 13% higher for people  identified as -American.        10/24/2019 1.15 (H) 0.50 - 0.99 mg/dL Final     Comment:     For patients >49 years of age, the reference limit  for Creatinine is approximately 13% higher for people  identified as -American.        04/03/2019 0.84 0.50 - 0.99 mg/dL Final     Comment:     For patients >49 years of age, the reference limit  for Creatinine is approximately 13% higher for people  identified as -American.                   Passed - eGFR is 60 or above and within 360 days     eGFR if non    Date Value Ref Range Status   01/16/2020 59 (L) > OR = 60 mL/min/1.73m2 Final   10/24/2019 50 (L) > OR = 60 mL/min/1.73m2 Final   04/03/2019 73 > OR = 60 mL/min/1.73m2 Final     eGFR if    Date Value Ref Range Status   01/16/2020 68 > OR = 60 mL/min/1.73m2 Final   10/24/2019 57 (L) > OR = 60 mL/min/1.73m2 Final   04/03/2019 85 > OR = 60 mL/min/1.73m2 Final                  Appointments  past 12m or future 3m with PCP    Date Provider   Last Visit   3/5/2020 GLENROY Kirby MD   Next Visit   Visit date not found GLENROY Kirby MD   ED visits in past 90 days: 0     Note composed:12:28 PM 08/13/2020

## 2020-08-15 DIAGNOSIS — I48.0 PAROXYSMAL ATRIAL FIBRILLATION: ICD-10-CM

## 2020-08-15 NOTE — TELEPHONE ENCOUNTER
No new care gaps identified.  Powered by [employer]. Reference number: [id number]. 8/15/2020 9:34:12 AM CDT

## 2020-08-16 RX ORDER — METOPROLOL SUCCINATE 100 MG/1
TABLET, EXTENDED RELEASE ORAL
Qty: 90 TABLET | Refills: 1 | Status: SHIPPED | OUTPATIENT
Start: 2020-08-16 | End: 2021-02-12

## 2020-08-17 NOTE — PROGRESS NOTES
Refill Authorization Note    is requesting a refill authorization.    Brief assessment and rationale for refill: APPROVE: PRR          Medication Therapy Plan: CDMR; APPROVE PER PROTOCOL    Medication reconciliation completed: No                         Comments:          Requested Prescriptions   Pending Prescriptions Disp Refills    metoprolol succinate (TOPROL-XL) 100 MG 24 hr tablet [Pharmacy Med Name: METOPROLOL SUCC  MG TAB] 90 tablet 1     Sig: TAKE 1 TABLET BY MOUTH EVERY DAY       Cardiovascular:  Beta Blockers Passed - 8/15/2020  9:33 AM        Passed - Patient is at least 18 years old        Passed - Last BP in normal range within 360 days.     BP Readings from Last 3 Encounters:   03/05/20 134/79   03/05/20 134/79   10/24/19 118/74              Passed - Last Heart Rate in normal range within 360 days.     Pulse Readings from Last 3 Encounters:   03/05/20 101   10/24/19 83   09/18/19 107             Passed - Office visit in past 12 months or future 90 days.     Recent Outpatient Visits            3 months ago Type 2 diabetes mellitus with both eyes affected by retinopathy without macular edema, with long-term current use of insulin, unspecified retinopathy severity    [facility] EMMANUEL Williamson,FNP-C    5 months ago Paroxysmal atrial fibrillation    [facility] Cardiology Lul Wright MD    5 months ago Type 2 diabetes mellitus with both eyes affected by retinopathy without macular edema, with long-term current use of insulin, unspecified retinopathy severity    [facility] Family Medicine GLENROY Kirby MD    9 months ago Type 2 diabetes mellitus with both eyes affected by retinopathy without macular edema, with long-term current use of insulin, unspecified retinopathy severity    [facility] Endocrinology EMMANUEL Williamson,FNP-C    11 months ago Ductal carcinoma in situ (DCIS) of left breast    [facility] Hematology Oncology  Chinmay Cam MD          Future Appointments              In 3 weeks Lul Wright MD [facility] [place]    In 1 month EMMANUEL Williamson,FNP-C [facility] [place]                    Appointments  past 12m or future 3m with PCP    Date Provider   Last Visit   3/5/2020 GLENROY Kirby MD   Next Visit   Visit date not found GLENROY Kirby MD   ED visits in past 90 days: 0     Note composed:8:42 PM 08/16/2020

## 2020-09-15 ENCOUNTER — PATIENT OUTREACH (OUTPATIENT)
Dept: ADMINISTRATIVE | Facility: OTHER | Age: 65
End: 2020-09-15

## 2020-09-15 LAB
ALBUMIN SERPL-MCNC: 4.1 G/DL (ref 3.6–5.1)
ALBUMIN/GLOB SERPL: 1.6 (CALC) (ref 1–2.5)
ALP SERPL-CCNC: 77 U/L (ref 37–153)
ALT SERPL-CCNC: 10 U/L (ref 6–29)
AST SERPL-CCNC: 13 U/L (ref 10–35)
BILIRUB SERPL-MCNC: 0.5 MG/DL (ref 0.2–1.2)
BUN SERPL-MCNC: 22 MG/DL (ref 7–25)
BUN/CREAT SERPL: 13 (CALC) (ref 6–22)
CALCIUM SERPL-MCNC: 8.6 MG/DL (ref 8.6–10.4)
CHLORIDE SERPL-SCNC: 99 MMOL/L (ref 98–110)
CHOLEST SERPL-MCNC: 117 MG/DL
CHOLEST/HDLC SERPL: 3.3 (CALC)
CO2 SERPL-SCNC: 27 MMOL/L (ref 20–32)
CREAT SERPL-MCNC: 1.65 MG/DL (ref 0.5–0.99)
GFRSERPLBLD MDRD-ARVRAT: 32 ML/MIN/1.73M2
GLOBULIN SER CALC-MCNC: 2.6 G/DL (CALC) (ref 1.9–3.7)
GLUCOSE SERPL-MCNC: 100 MG/DL (ref 65–99)
HBA1C MFR BLD: 6.8 % OF TOTAL HGB
HDLC SERPL-MCNC: 36 MG/DL
LDLC SERPL CALC-MCNC: 53 MG/DL (CALC)
NONHDLC SERPL-MCNC: 81 MG/DL (CALC)
POTASSIUM SERPL-SCNC: 3.8 MMOL/L (ref 3.5–5.3)
PROT SERPL-MCNC: 6.7 G/DL (ref 6.1–8.1)
SODIUM SERPL-SCNC: 139 MMOL/L (ref 135–146)
TRIGL SERPL-MCNC: 223 MG/DL

## 2020-09-15 NOTE — PROGRESS NOTES
Health Maintenance Due   Topic Date Due    Shingles Vaccine (1 of 2) 04/27/2003    Influenza Vaccine (1) 08/01/2020    Colorectal Cancer Screening  08/11/2020     Updates were requested from care everywhere.  Chart was reviewed for overdue Proactive [facility] Encounters ([facility]ON[facility]) topics (CRS, Breast Cancer Screening, Eye exam)  Health Maintenance has been updated.  LINKS immunization registry triggered.  Immunizations were reconciled.  Updated patient history.

## 2020-09-16 ENCOUNTER — OFFICE VISIT (OUTPATIENT)
Dept: ENDOCRINOLOGY | Facility: CLINIC | Age: 65
End: 2020-09-16
Payer: MEDICARE

## 2020-09-16 VITALS
BODY MASS INDEX: 32.84 KG/M2 | SYSTOLIC BLOOD PRESSURE: 120 MMHG | HEIGHT: 61 IN | HEART RATE: 84 BPM | WEIGHT: 173.94 LBS | DIASTOLIC BLOOD PRESSURE: 60 MMHG

## 2020-09-16 DIAGNOSIS — Z79.4 TYPE 2 DIABETES MELLITUS WITH BOTH EYES AFFECTED BY RETINOPATHY WITHOUT MACULAR EDEMA, WITH LONG-TERM CURRENT USE OF INSULIN, UNSPECIFIED RETINOPATHY SEVERITY: Primary | ICD-10-CM

## 2020-09-16 DIAGNOSIS — I10 ESSENTIAL HYPERTENSION: ICD-10-CM

## 2020-09-16 DIAGNOSIS — E11.319 TYPE 2 DIABETES MELLITUS WITH BOTH EYES AFFECTED BY RETINOPATHY WITHOUT MACULAR EDEMA, WITH LONG-TERM CURRENT USE OF INSULIN, UNSPECIFIED RETINOPATHY SEVERITY: Primary | ICD-10-CM

## 2020-09-16 DIAGNOSIS — E11.649 HYPOGLYCEMIA UNAWARENESS ASSOCIATED WITH TYPE 2 DIABETES MELLITUS: ICD-10-CM

## 2020-09-16 DIAGNOSIS — E78.2 MIXED HYPERLIPIDEMIA: ICD-10-CM

## 2020-09-16 DIAGNOSIS — N28.9 RENAL INSUFFICIENCY: ICD-10-CM

## 2020-09-16 PROCEDURE — 99999 PR PBB SHADOW E&M-EST. PATIENT-LVL IV: ICD-10-PCS | Mod: PBBFAC,,, | Performed by: NURSE PRACTITIONER

## 2020-09-16 PROCEDURE — 99214 OFFICE O/P EST MOD 30 MIN: CPT | Mod: S$PBB,,, | Performed by: NURSE PRACTITIONER

## 2020-09-16 PROCEDURE — 99999 PR PBB SHADOW E&M-EST. PATIENT-LVL IV: CPT | Mod: PBBFAC,,, | Performed by: NURSE PRACTITIONER

## 2020-09-16 PROCEDURE — 99214 PR OFFICE/OUTPT VISIT, EST, LEVL IV, 30-39 MIN: ICD-10-PCS | Mod: S$PBB,,, | Performed by: NURSE PRACTITIONER

## 2020-09-16 PROCEDURE — 99214 OFFICE O/P EST MOD 30 MIN: CPT | Mod: PBBFAC,PO | Performed by: NURSE PRACTITIONER

## 2020-09-16 RX ORDER — LANCETS
EACH MISCELLANEOUS
Qty: 100 EACH | Refills: 3 | Status: ON HOLD | OUTPATIENT
Start: 2020-09-16 | End: 2022-10-24 | Stop reason: HOSPADM

## 2020-09-16 RX ORDER — INSULIN PUMP SYRINGE, 3 ML
EACH MISCELLANEOUS
Qty: 1 EACH | Refills: 0 | Status: ON HOLD | OUTPATIENT
Start: 2020-09-16 | End: 2022-10-24 | Stop reason: HOSPADM

## 2020-09-16 NOTE — PATIENT INSTRUCTIONS
BMP at [facility] in 4 weeks (mid October).    A1c, CMP, microalbumin/creatinine ratio (urine test) will be due before your 6 month follow up.

## 2020-09-16 NOTE — PROGRESS NOTES
"CC: Ms. Jeanine Chandler arrives today for management of Type 2 DM and review of chronic medical conditions, as listed in the Visit Diagnosis section of this encounter.       HPI: Ms. Jeanine Chandler was diagnosed with Type 2 DM in her late 40s. She was diagnosed based on lab work. Initial treatment consisted of metformin and insulin was added a few years later. + FH of DM in mother and sister. Denies hospitalizations due to DM.     Patient's last appt was in May, via telephone visit.      Her insurance no longer covers Freestyle Krupa, as Dexcom is preferred. However, she doesn't meet criteria for coverage.     Not checking BG. She thinks that her meter is ~ 5 years old.     Hypoglycemia: No    Missing Insulin/PO medication doses: No    Exercise: No    Dietary Habits: Eats 3 meals/day. May snack on piece of fruit or vegetable. Avoids sugary beverages.     Last DM education appointment: 4/9/2019      CURRENT DIABETIC MEDS: metformin 1000 mg BID, Ozempic 0.5 mg weekly, Levemir 34 units QHS  Vial or pen: pen  Glucometer type: unsure    Previous DM treatments:  Januvia    Last Eye Exam: 1/2020. + DR. Meyers provider at [facility]'s   Last Podiatry Exam: n/a    REVIEW OF SYSTEMS  Constitutional: no c/o weakness, fatigue, weight loss.   Eyes: denies visual disturbances.  Cardiac: no palpitations or chest pain.  Respiratory: no dyspnea, cough  GI: no c/o abdominal pain or nausea. Denies h/o pancreatitis.  Skin: no lesions or rashes.   Neuro: no numbness, tingling, or parasthesias.  Endocrine: denies polyphagia, polydipsia, polyuria      Personally reviewed Past Medical, Surgical, Social History.    Vital Signs  /60   Pulse 84   Ht 5' 1" (1.549 m)   Wt 78.9 kg (173 lb 15.1 oz)   BMI 32.87 kg/m²      Personally reviewed the below labs:    Hemoglobin A1C   Date Value Ref Range Status   09/14/2020 6.8 (H) <5.7 % of total Hgb Final     Comment:     For someone without known diabetes, a hemoglobin A1c  value of 6.5% or " greater indicates that they may have   diabetes and this should be confirmed with a follow-up   test.     For someone with known diabetes, a value <7% indicates   that their diabetes is well controlled and a value   greater than or equal to 7% indicates suboptimal   control. A1c targets should be individualized based on   duration of diabetes, age, comorbid conditions, and   other considerations.     Currently, no consensus exists regarding use of  hemoglobin A1c for diagnosis of diabetes for children.         01/16/2020 6.4 (H) <5.7 % of total Hgb Final     Comment:     For someone without known diabetes, a hemoglobin   A1c value between 5.7% and 6.4% is consistent with  prediabetes and should be confirmed with a   follow-up test.     For someone with known diabetes, a value <7%  indicates that their diabetes is well controlled. A1c  targets should be individualized based on duration of  diabetes, age, comorbid conditions, and other  considerations.     This assay result is consistent with an increased risk  of diabetes.     Currently, no consensus exists regarding use of  hemoglobin A1c for diagnosis of diabetes for children.        10/24/2019 6.4 (H) <5.7 % of total Hgb Final     Comment:     For someone without known diabetes, a hemoglobin   A1c value between 5.7% and 6.4% is consistent with  prediabetes and should be confirmed with a   follow-up test.     For someone with known diabetes, a value <7%  indicates that their diabetes is well controlled. A1c  targets should be individualized based on duration of  diabetes, age, comorbid conditions, and other  considerations.     This assay result is consistent with an increased risk  of diabetes.     Currently, no consensus exists regarding use of  hemoglobin A1c for diagnosis of diabetes for children.            Chemistry        Component Value Date/Time     09/14/2020 1038    K 3.8 09/14/2020 1038    CL 99 09/14/2020 1038    CO2 27 09/14/2020 1038    BUN 22  09/14/2020 1038    CREATININE 1.65 (H) 09/14/2020 1038     (H) 09/14/2020 1038        Component Value Date/Time    CALCIUM 8.6 09/14/2020 1038    ALKPHOS 78 01/16/2020 1106    AST 13 09/14/2020 1038    ALT 10 09/14/2020 1038    BILITOT 0.5 09/14/2020 1038    ESTGFRAFRICA 37 (L) 09/14/2020 1038    EGFRNONAA 32 (L) 09/14/2020 1038          Lab Results   Component Value Date    CHOL 117 09/14/2020    CHOL 121 01/16/2020    CHOL 151 04/03/2019     Lab Results   Component Value Date    HDL 36 (L) 09/14/2020    HDL 40 (L) 01/16/2020    HDL 43 (L) 04/03/2019     Lab Results   Component Value Date    LDLCALC 53 09/14/2020    LDLCALC 54 01/16/2020    LDLCALC 75 04/03/2019     Lab Results   Component Value Date    TRIG 223 (H) 09/14/2020    TRIG 203 (H) 01/16/2020    TRIG 248 (H) 04/03/2019     Lab Results   Component Value Date    CHOLHDL 3.3 09/14/2020    CHOLHDL 3.0 01/16/2020    CHOLHDL 3.5 04/03/2019       Lab Results   Component Value Date    MICALBCREAT 3 01/16/2020     Lab Results   Component Value Date    TSH 2.40 04/03/2019       CrCl cannot be calculated (Unknown ideal weight.).    No results found for: [id number]      PHYSICAL EXAMINATION  Constitutional: Appears well, no distress  Neck: Supple, trachea midline; no thyromegaly or nodules.   Respiratory: CTA, even and unlabored.  Cardiovascular: RRR, no murmurs, no carotid bruits. DP pulses  2+ bilaterally; no edema.    GI: bowel sounds active, no hernia noted  Skin: warm and dry; no lipohypertrophy, or acanthosis nigracans observed.  Neuro: oriented to person, place, time. Previously, no loss of protective sensation via 10 gm monofilament or vibratory exam bilaterally.   Feet: appropriate footwear.      A1c target < 7%      Assessment/Plan  1. Type 2 diabetes mellitus with retinopathy of both eyes, with long-term current use of insulin, macular edema presence unspecified, unspecified retinopathy severity  -- Stable. Unfortunately, even with history of  hypoglycemia unawareness, Dexcom isn't covered. This is due to the fact that she isn't taking 4 insulin injections/day.   -- continue Levemir 34 units QHS.   -- continue metformin, Ozempic at current doses.  -- resume BG monitoring 1x/day. Rx for meter and testing supplies sent to pharmacy.      -- Discussed diagnosis of DM, A1c goals, progression of disease, long term complications and tx options.    -- Reviewed hypoglycemia management: treat with 1/2 glass of juice, 1/2 can regular coke, or 4 glucose tablets. Monitor and repeat treatment every 15 minutes until BG is >70 Then have a snack, which includes a complex carbohydrate and protein.   Advised patient to check BG before activities, such as driving or exercise.    -- takes statin, ACE-I, Xarelto     2. Essential hypertension  -- controlled  -- continue Hyzaar   3. Hyperlipidemia, unspecified hyperlipidemia type  -- uncontrolled with elevated triglycerides. LDL at goal.   -- taking atorvastatin   4. Hypoglycemia unawareness associated with Type 2 DM  -- recommended resuming BG monitoring   5. Renal insufficiency -- new problem  -- will order BMP at [facility] in 4 weeks. May consider Nephrology referral if still suppressed.        FOLLOW UP  Follow up in about 6 months (around 3/16/2021).   Patient instructed to bring BG logs to each follow up   Patient encouraged to call for any BG/medication issues, concerns, or questions.      Orders Placed This Encounter   Procedures    Hemoglobin A1C    Comprehensive metabolic panel    Microalbumin/creatinine urine ratio    Basic metabolic panel

## 2020-09-19 DIAGNOSIS — F41.9 ANXIETY: ICD-10-CM

## 2020-09-19 NOTE — TELEPHONE ENCOUNTER
No new care gaps identified.  Powered by [employer]. Reference number: [id number]. 9/19/2020 9:38:23 AM MERARYT

## 2020-09-21 RX ORDER — ESCITALOPRAM OXALATE 20 MG/1
TABLET ORAL
Qty: 90 TABLET | Refills: 1 | Status: SHIPPED | OUTPATIENT
Start: 2020-09-21 | End: 2021-04-12

## 2020-09-21 NOTE — PROGRESS NOTES
Refill Authorization Note   Jeanine Chandler is requesting a refill authorization.     Brief assessment and rationale for refill: APPROVE: PRR          Medication Therapy Plan: CDMR; APPROVE PER PROTOCOL    Medication reconciliation completed: No                    Comments:          Requested Prescriptions   Pending Prescriptions Disp Refills    escitalopram oxalate (LEXAPRO) 20 MG tablet [Pharmacy Med Name: ESCITALOPRAM 20 MG TABLET] 90 tablet 1     Sig: TAKE 1 TABLET BY MOUTH EVERY DAY       Psychiatry:  Antidepressants - SSRI Failed - 9/19/2020  9:37 AM        Failed - Office visit in past 6 months or future 90 days.     Recent Outpatient Visits            5 days ago Type 2 diabetes mellitus with both eyes affected by retinopathy without macular edema, with long-term current use of insulin, unspecified retinopathy severity    [facility] EMMANUEL Williamson,EMMANUELP-C    4 months ago Type 2 diabetes mellitus with both eyes affected by retinopathy without macular edema, with long-term current use of insulin, unspecified retinopathy severity    [facility] EMMANUEL Williamson,TIEN-C    6 months ago Paroxysmal atrial fibrillation    [facility] Lul Wright MD    6 months ago Type 2 diabetes mellitus with both eyes affected by retinopathy without macular edema, with long-term current use of insulin, unspecified retinopathy severity    [facility] GLENROY Kirby MD    11 months ago Type 2 diabetes mellitus with both eyes affected by retinopathy without macular edema, with long-term current use of insulin, unspecified retinopathy severity    [facility] EMMANUEL Williamson,FNP-C          Future Appointments              In 1 month Lul Wright MD [facility], [place]                Passed - Patient is at least 18 years old            Appointments  past 12m or future 3m with PCP    Date  Provider   Last Visit   3/5/2020 GLENROY Kirby MD   Next Visit   Visit date not found GLENROY Kirby MD   ED visits in past 90 days: 0     Note composed:11:55 AM 09/21/2020

## 2020-10-12 DIAGNOSIS — I10 ESSENTIAL HYPERTENSION: ICD-10-CM

## 2020-10-12 NOTE — TELEPHONE ENCOUNTER
No new care gaps identified.  Powered by [employer]. Reference number: [id number]. 10/12/2020 2:38:51 PM   CDT

## 2020-10-13 RX ORDER — LOSARTAN POTASSIUM AND HYDROCHLOROTHIAZIDE 12.5; 5 MG/1; MG/1
TABLET ORAL
Qty: 90 TABLET | Refills: 0 | Status: SHIPPED | OUTPATIENT
Start: 2020-10-13 | End: 2021-05-04 | Stop reason: SDUPTHER

## 2020-10-13 NOTE — PROGRESS NOTES
Refill Routing Note   Medication(s) are not appropriate for processing by [facility] for the following reason(s):     - Required laboratory values are abnormal    [facility] actions taken in this encounter: Defer       Medication Therapy Plan: CDMR; Cr elevated; Defer to you  Medication reconciliation completed: No   Automatic Epic Generated Protocol Data:        Requested Prescriptions   Pending Prescriptions Disp Refills    losartan-hydrochlorothiazide 50-12.5 mg (HYZAAR) 50-12.5 mg per tablet [Pharmacy Med Name: LOSARTAN-HCTZ 50-12.5 MG TAB] 90 tablet 0     Sig: TAKE 1 TABLET BY MOUTH EVERY DAY       Cardiovascular: ARB + Diuretic Combos Failed - 10/12/2020  2:38 PM        Failed - Cr is 1.3 or below and within 180 days     Creatinine   Date Value Ref Range Status   09/14/2020 1.65 (H) 0.50 - 0.99 mg/dL Final     Comment:     For patients >49 years of age, the reference limit  for Creatinine is approximately 13% higher for people  identified as -American.        01/16/2020 1.00 (H) 0.50 - 0.99 mg/dL Final     Comment:     For patients >49 years of age, the reference limit  for Creatinine is approximately 13% higher for people  identified as -American.        10/24/2019 1.15 (H) 0.50 - 0.99 mg/dL Final     Comment:     For patients >49 years of age, the reference limit  for Creatinine is approximately 13% higher for people  identified as -American.                   Passed - Patient is at least 18 years old        Passed - Last BP in normal range within 360 days     BP Readings from Last 3 Encounters:   09/16/20 120/60   03/05/20 134/79   03/05/20 134/79              Passed - Office Visit within last 12 months or future 90 days.     Recent Outpatient Visits            3 weeks ago Type 2 diabetes mellitus with both eyes affected by retinopathy without macular edema, with long-term current use of insulin, unspecified retinopathy severity    [facility] Jasmyn Mayo,  KAREN BENITEZ    5 months ago Type 2 diabetes mellitus with both eyes affected by retinopathy without macular edema, with long-term current use of insulin, unspecified retinopathy severity    [facility] EMMANUEL Williamson FNP-C    7 months ago Paroxysmal atrial fibrillation    [facility] Lul Wright MD    7 months ago Type 2 diabetes mellitus with both eyes affected by retinopathy without macular edema, with long-term current use of insulin, unspecified retinopathy severity    [facility] GLENROY Kirby MD    11 months ago Type 2 diabetes mellitus with both eyes affected by retinopathy without macular edema, with long-term current use of insulin, unspecified retinopathy severity    [facility] EMMANUEL Williamson FNP-C          Future Appointments              In 3 weeks Lul Wright MD [facility], [place]                Passed - K in normal range and within 180 days     Potassium   Date Value Ref Range Status   09/14/2020 3.8 3.5 - 5.3 mmol/L Final   01/16/2020 4.2 3.5 - 5.3 mmol/L Final   10/24/2019 4.3 3.5 - 5.3 mmol/L Final              Passed - Na is between 130 and 148 and within 180 days     Sodium   Date Value Ref Range Status   09/14/2020 139 135 - 146 mmol/L Final   01/16/2020 138 135 - 146 mmol/L Final   10/24/2019 138 135 - 146 mmol/L Final              Passed - eGFR within 180 days     eGFR if non    Date Value Ref Range Status   09/14/2020 32 (L) > OR = 60 mL/min/1.73m2 Final   01/16/2020 59 (L) > OR = 60 mL/min/1.73m2 Final   10/24/2019 50 (L) > OR = 60 mL/min/1.73m2 Final     eGFR if    Date Value Ref Range Status   09/14/2020 37 (L) > OR = 60 mL/min/1.73m2 Final   01/16/2020 68 > OR = 60 mL/min/1.73m2 Final   10/24/2019 57 (L) > OR = 60 mL/min/1.73m2 Final                    Appointments  past 12m or future 3m with PCP    Date Provider   Last Visit    3/5/2020 GLENROY Kirby MD   Next Visit   Visit date not found GLENROY Kirby MD   ED visits in past 90 days: 0        Note composed:9:52 AM 10/13/2020

## 2020-10-15 DIAGNOSIS — Z79.4 TYPE 2 DIABETES MELLITUS WITH BOTH EYES AFFECTED BY RETINOPATHY WITHOUT MACULAR EDEMA, WITH LONG-TERM CURRENT USE OF INSULIN, UNSPECIFIED RETINOPATHY SEVERITY: ICD-10-CM

## 2020-10-15 DIAGNOSIS — E11.319 TYPE 2 DIABETES MELLITUS WITH BOTH EYES AFFECTED BY RETINOPATHY WITHOUT MACULAR EDEMA, WITH LONG-TERM CURRENT USE OF INSULIN, UNSPECIFIED RETINOPATHY SEVERITY: ICD-10-CM

## 2020-10-15 RX ORDER — SEMAGLUTIDE 1.34 MG/ML
0.5 INJECTION, SOLUTION SUBCUTANEOUS
Qty: 3 SYRINGE | Refills: 3 | Status: SHIPPED | OUTPATIENT
Start: 2020-10-15 | End: 2021-06-01 | Stop reason: SDUPTHER

## 2020-10-29 DIAGNOSIS — K21.9 GASTROESOPHAGEAL REFLUX DISEASE WITHOUT ESOPHAGITIS: ICD-10-CM

## 2020-10-29 NOTE — TELEPHONE ENCOUNTER
No new care gaps identified.  Powered by [employer]. Reference number: [id number]. 10/29/2020 5:03:19 PM   CDT

## 2020-11-01 NOTE — PROGRESS NOTES
Refill Routing Note   Medication(s) are not appropriate for processing by [facility] for the following reason(s):     - Required laboratory values are abnormal    [facility] actions taken in this encounter: Defer       Medication Therapy Plan: CDMR. SCr>1.4; Defer to PCP  Medication reconciliation completed: No   Automatic Epic Generated Protocol Data:        Requested Prescriptions   Pending Prescriptions Disp Refills    famotidine (PEPCID) 40 MG tablet [Pharmacy Med Name: FAMOTIDINE 40 MG TABLET] 90 tablet 3     Sig: TAKE 1 TABLET BY MOUTH EVERY DAY       Gastroenterology: H2 Antagonists - famotidine Failed - 10/29/2020  5:02 PM        Failed - Cr is 1.4 or below and within 360 days     Creatinine   Date Value Ref Range Status   09/14/2020 1.65 (H) 0.50 - 0.99 mg/dL Final     Comment:     For patients >49 years of age, the reference limit  for Creatinine is approximately 13% higher for people  identified as -American.        01/16/2020 1.00 (H) 0.50 - 0.99 mg/dL Final     Comment:     For patients >49 years of age, the reference limit  for Creatinine is approximately 13% higher for people  identified as -American.        10/24/2019 1.15 (H) 0.50 - 0.99 mg/dL Final     Comment:     For patients >49 years of age, the reference limit  for Creatinine is approximately 13% higher for people  identified as -American.                   Failed - eGFR is 60 or above and within 360 days     eGFR if non    Date Value Ref Range Status   09/14/2020 32 (L) > OR = 60 mL/min/1.73m2 Final   01/16/2020 59 (L) > OR = 60 mL/min/1.73m2 Final   10/24/2019 50 (L) > OR = 60 mL/min/1.73m2 Final     eGFR if    Date Value Ref Range Status   09/14/2020 37 (L) > OR = 60 mL/min/1.73m2 Final   01/16/2020 68 > OR = 60 mL/min/1.73m2 Final   10/24/2019 57 (L) > OR = 60 mL/min/1.73m2 Final              Passed - Patient is at least 18 years old        Passed - Negative Pregnancy Status Check         Passed - Office visit in past 12 months or future 90 days     Recent Outpatient Visits            1 month ago Type 2 diabetes mellitus with both eyes affected by retinopathy without macular edema, with long-term current use of insulin, unspecified retinopathy severity    [facility] EMMANUEL Williamson,TIEN-C    5 months ago Type 2 diabetes mellitus with both eyes affected by retinopathy without macular edema, with long-term current use of insulin, unspecified retinopathy severity    [facility] EMMANUEL Williamson FNP-C    8 months ago Paroxysmal atrial fibrillation    [facility] Card[facility] Lul Wright MD    8 months ago Type 2 diabetes mellitus with both eyes affected by retinopathy without macular edema, with long-term current use of insulin, unspecified retinopathy severity    [facility] GLENROY Kirby MD    1 year ago Type 2 diabetes mellitus with both eyes affected by retinopathy without macular edema, with long-term current use of insulin, unspecified retinopathy severity    [facility] EMMANUEL Williamson,TIEN-C          Future Appointments              In 4 days Lul Wright MD [facility], [place]                      Appointments  past 12m or future 3m with PCP    Date Provider   Last Visit   3/5/2020 GLENROY Kirby MD   Next Visit   Visit date not found GLENROY Kirby MD   ED visits in past 90 days: 0        Note composed:8:28 PM 10/31/2020

## 2020-11-02 ENCOUNTER — PATIENT OUTREACH (OUTPATIENT)
Dept: ADMINISTRATIVE | Facility: OTHER | Age: 65
End: 2020-11-02

## 2020-11-02 RX ORDER — FAMOTIDINE 40 MG/1
TABLET, FILM COATED ORAL
Qty: 90 TABLET | Refills: 3 | Status: SHIPPED | OUTPATIENT
Start: 2020-11-02 | End: 2021-11-22 | Stop reason: SDUPTHER

## 2020-11-02 NOTE — PROGRESS NOTES
LINKS immunization registry not responding  Care Everywhere updated  Health Maintenance updated  Chart reviewed for overdue [facility] ([facility]) health maintenance testing (CRS, Breast Ca, Diabetic Eye Exam)   Orders entered:N/A

## 2020-11-20 ENCOUNTER — TELEPHONE (OUTPATIENT)
Dept: HEMATOLOGY/ONCOLOGY | Facility: CLINIC | Age: 65
End: 2020-11-20

## 2020-11-20 NOTE — TELEPHONE ENCOUNTER
Message fwd to MD for appt clarification.    ----- Message from Sherry Byrd sent at 11/20/2020  2:05 PM CST -----  Regarding: Appt  Pt is calling to speak with Staff regarding being scheduled for mammogram and an appt on the same day.      She can be reached at [phone].    Thank you.

## 2020-11-23 ENCOUNTER — TELEPHONE (OUTPATIENT)
Dept: HEMATOLOGY/ONCOLOGY | Facility: CLINIC | Age: 65
End: 2020-11-23

## 2020-11-23 DIAGNOSIS — Z12.31 ENCOUNTER FOR SCREENING MAMMOGRAM FOR MALIGNANT NEOPLASM OF BREAST: ICD-10-CM

## 2020-11-23 DIAGNOSIS — Z86.000 HISTORY OF DUCTAL CARCINOMA IN SITU (DCIS) OF BREAST: ICD-10-CM

## 2020-11-23 DIAGNOSIS — Z86.000 HISTORY OF DUCTAL CARCINOMA IN SITU (DCIS) OF BREAST: Primary | ICD-10-CM

## 2020-11-23 NOTE — TELEPHONE ENCOUNTER
Returned call scheduled for 12/14 with Dr Cam and mammogram 12/8 in [place]. Mailed appt slips.    ----- Message from Vijaya Chavez sent at 11/23/2020  3:19 PM CST -----  Regarding: FW: Appt  Order in  ----- Message -----  From: Chinmay Cam MD  Sent: 11/23/2020   3:13 PM CST  To: Vijaya Chavez  Subject: RE: Appt                                         screening  ----- Message -----  From: Vijaya Chavez  Sent: 11/23/2020  10:28 AM CST  To: Chinmay Cam MD  Subject: FW: Appt                                         Diagnostic bilat mammo?  ----- Message -----  From: Radha Arellano  Sent: 11/23/2020  10:28 AM CST  To: Vijaya Chavez  Subject: RE: Appt                                         Need orders.    ----- Message -----  From: Vijaya Chavez  Sent: 11/23/2020   7:11 AM CST  To: [facility]  Pool  Subject: FW: Appt                                         Please schedule next available with Haylee/Trang with mammo prior.  Thanks  ----- Message -----  From: Chinmay Cam MD  Sent: 11/22/2020   4:05 PM CST  To: Vijaya Chavez  Subject: RE: Appt                                         yes  ----- Message -----  From: Vijaya Chavez  Sent: 11/20/2020   2:23 PM CST  To: Chinmay Cam MD  Subject: FW: Appt                                         Looks like last seen in 2019- want her to see Haylee/Trang next available with mammo?  ----- Message -----  From: Sherry Byrd  Sent: 11/20/2020   2:05 PM CST  To: [facility] Staff  Subject: Appt                                             Pt is calling to speak with Staff regarding being scheduled for mammogram and an appt on the same day.      She can be reached at [phone].    Thank you.

## 2020-11-23 NOTE — TELEPHONE ENCOUNTER
Message fwd to .       ----- Message from Chinmay Cam MD sent at 11/22/2020  4:05 PM CST -----  Regarding: RE: Appt  yes  ----- Message -----  From: Vijaya Chavez  Sent: 11/20/2020   2:23 PM CST  To: Chinmay Cam MD  Subject: FW: Appt                                         Looks like last seen in 2019- want her to see Haylee/Trang next available with mammo?  ----- Message -----  From: Sherry Byrd  Sent: 11/20/2020   2:05 PM CST  To: [facility] Staff  Subject: Appt                                             Pt is calling to speak with Staff regarding being scheduled for mammogram and an appt on the same day.      She can be reached at [phone].    Thank you.

## 2020-11-24 NOTE — PROGRESS NOTES
"Subjective:    Patient ID:  Jeanine Chandler is a 65 y.o. female who presents for follow-up of Atrial Fibrillation, Hyperlipidemia, and Hypertension      Problem List Items Addressed This Visit        Cardiac/Vascular    Essential hypertension    Mixed hyperlipidemia    Paroxysmal atrial fibrillation - Primary          HPI    Patient was last seen on 03/05/2020 at which time she was doing well from a cardiac standpoint.  24 hr Holter monitor was ordered to get average heart rate, with never completed.    On assessment today, the patient states that she feels OK in general.     No chest pain.  No shortness of breath.  No palpitations  No bleeding on Xarelto       Review of Systems   Constitution: Negative for decreased appetite, fever and malaise/fatigue.   Eyes: Negative for blurred vision.   Cardiovascular: Negative for chest pain, dyspnea on exertion, irregular heartbeat and leg swelling.   Respiratory: Negative for cough, hemoptysis, shortness of breath and wheezing.    Endocrine: Negative for cold intolerance and heat intolerance.   Hematologic/Lymphatic: Negative for bleeding problem.   Musculoskeletal: Negative for muscle weakness and myalgias.   Gastrointestinal: Negative for abdominal pain, constipation and diarrhea.   Genitourinary: Negative for bladder incontinence.   Neurological: Negative for dizziness and weakness.   Psychiatric/Behavioral: Negative for depression.        Objective:     Vitals:    11/25/20 1158   BP: 118/75   BP Location: Right arm   Patient Position: Sitting   BP Method: Medium (Automatic)   Pulse: 84   Weight: 79.3 kg (174 lb 13.2 oz)   Height: 5' 1.5" (1.562 m)        Physical Exam   Constitutional: She is oriented to person, place, and time. She appears well-developed and well-nourished. No distress.   HENT:   Head: Normocephalic and atraumatic.   Neck: Neck supple. No JVD present.   Cardiovascular: Exam reveals no gallop and no friction rub.   No murmur heard.  Irregular Irregular "    Pulmonary/Chest: Effort normal and breath sounds normal. No respiratory distress. She has no wheezes. She has no rales.   Abdominal: Soft. Bowel sounds are normal. There is no abdominal tenderness. There is no rebound and no guarding.   Musculoskeletal:         General: No tenderness or edema.   Neurological: She is alert and oriented to person, place, and time.   Skin: Skin is warm and dry.   Psychiatric: Her behavior is normal.   Nursing note and vitals reviewed.          Current Outpatient Medications on File Prior to Visit   Medication Sig    albuterol (PROAIR HFA) 90 mcg/actuation inhaler Inhale 2 puffs into the lungs every 4 (four) hours as needed. 2 HFA Aerosol Inhaler Inhalation Every 4-6 hours    atorvastatin (LIPITOR) 40 MG tablet TAKE 1 TABLET BY MOUTH EVERY DAY    blood sugar diagnostic Strp To check BG 1 times daily, to use with insurance preferred meter. Dx code E11.319    blood-glucose meter kit To check BG 1 times daily, to use with insurance preferred meter.    clotrimazole-betamethasone 1-0.05% (LOTRISONE) cream Apply topically 2 (two) times daily.    epinephrine (EPIPEN) 0.3 mg/0.3 mL (1:1,000) AtIn Inject 0.3 mLs (0.3 mg total) into the muscle once. Pen Injector Intramuscular .  As directed for anaphylaxis PRN    escitalopram oxalate (LEXAPRO) 20 MG tablet TAKE 1 TABLET BY MOUTH EVERY DAY    famotidine (PEPCID) 40 MG tablet TAKE 1 TABLET BY MOUTH EVERY DAY    fluticasone propionate (FLOVENT HFA) 220 mcg/actuation inhaler Inhale 1 puff into the lungs 2 (two) times daily. Controller    insulin detemir U-100 (LEVEMIR FLEXTOUCH U-100 INSULN) 100 unit/mL (3 mL) InPn pen Inject 34 Units into the skin every evening.    lancets Misc To check BG 1 times daily, to use with insurance preferred meter.    losartan-hydrochlorothiazide 50-12.5 mg (HYZAAR) 50-12.5 mg per tablet TAKE 1 TABLET BY MOUTH EVERY DAY    metFORMIN (GLUCOPHAGE) 1000 MG tablet Take 1 tablet (1,000 mg total) by mouth 2  "(two) times daily with meals.    metoprolol succinate (TOPROL-XL) 100 MG 24 hr tablet TAKE 1 TABLET BY MOUTH EVERY DAY    pen needle, diabetic (BD ULTRA-FINE SHORT PEN NEEDLE) 31 gauge x 5/16" Ndle USE WITH INSULIN EVERY EVENING AS DIRECTED    semaglutide (OZEMPIC) 0.25 mg or 0.5 mg(2 mg/1.5 mL) PnIj Inject 0.5 mg into the skin every 7 days.    vitamin D (VITAMIN D3) 1000 units Tab Take 2,000 Units by mouth once daily.    XARELTO 20 mg Tab TAKE 1 TABLET (20 MG TOTAL) BY MOUTH DAILY WITH DINNER OR EVENING MEAL.    fexofenadine (ALLEGRA) 180 MG tablet Take 180 mg by mouth once daily.     No current facility-administered medications on file prior to visit.        Lipid Panel:   Lab Results   Component Value Date    CHOL 117 09/14/2020    HDL 36 (L) 09/14/2020    LDLCALC 53 09/14/2020    TRIG 223 (H) 09/14/2020    CHOLHDL 3.3 09/14/2020       The ASCVD Risk score ([place] MARGO Jr., et al., 2013) failed to calculate for the following reasons:    The valid total cholesterol range is 130 to 320 mg/dL    All pertinent labs, imaging, and EKGs reviewed.  Patient's most recent EKG tracing was personally interpreted by this provider.    Assessment:       1. Paroxysmal atrial fibrillation    2. Essential hypertension    3. Mixed hyperlipidemia         Plan:     Symptoms OK today  BP/Pulse OK today  Currently in aFib   Will pursue rate control strategy at this time considering asymptomatic paroxysmal aFib    Continue Xarelto 20 mg PO daily.  Emphasized to the patient to take the medication with the largest meal of the day.     Continue metoprolol succinate 100 mg PO Daily   Continue atorvastatin to 40 mg PO Daily   24 hr Holter monitor to obtain average heart rate   Echocardiogram prior to next visit     Continue other cardiac medications  Mediterranean Diet/Cardiovascular Exercise Program    Patient queried and all questions were answered.    F/u in 9 months to reassess with echo and 24 hour holter prior      Signed:    Lul " MD Kyle  11/25/2020 12:52 PM

## 2020-11-25 ENCOUNTER — OFFICE VISIT (OUTPATIENT)
Dept: CARDIOLOGY | Facility: CLINIC | Age: 65
End: 2020-11-25
Payer: MEDICARE

## 2020-11-25 ENCOUNTER — IMMUNIZATION (OUTPATIENT)
Dept: PHARMACY | Facility: CLINIC | Age: 65
End: 2020-11-25
Payer: OTHER GOVERNMENT

## 2020-11-25 VITALS
DIASTOLIC BLOOD PRESSURE: 75 MMHG | HEART RATE: 84 BPM | BODY MASS INDEX: 32.17 KG/M2 | HEIGHT: 62 IN | WEIGHT: 174.81 LBS | SYSTOLIC BLOOD PRESSURE: 118 MMHG

## 2020-11-25 DIAGNOSIS — Z12.11 SPECIAL SCREENING FOR MALIGNANT NEOPLASMS, COLON: Primary | ICD-10-CM

## 2020-11-25 DIAGNOSIS — I10 ESSENTIAL HYPERTENSION: ICD-10-CM

## 2020-11-25 DIAGNOSIS — I48.0 PAROXYSMAL ATRIAL FIBRILLATION: Primary | ICD-10-CM

## 2020-11-25 DIAGNOSIS — E78.2 MIXED HYPERLIPIDEMIA: ICD-10-CM

## 2020-11-25 PROCEDURE — 99999 PR PBB SHADOW E&M-EST. PATIENT-LVL IV: CPT | Mod: PBBFAC,,, | Performed by: INTERNAL MEDICINE

## 2020-11-25 PROCEDURE — 99214 PR OFFICE/OUTPT VISIT, EST, LEVL IV, 30-39 MIN: ICD-10-PCS | Mod: S$PBB,,, | Performed by: INTERNAL MEDICINE

## 2020-11-25 PROCEDURE — 99214 OFFICE O/P EST MOD 30 MIN: CPT | Mod: S$PBB,,, | Performed by: INTERNAL MEDICINE

## 2020-11-25 PROCEDURE — 99999 PR PBB SHADOW E&M-EST. PATIENT-LVL IV: ICD-10-PCS | Mod: PBBFAC,,, | Performed by: INTERNAL MEDICINE

## 2020-11-25 PROCEDURE — 99214 OFFICE O/P EST MOD 30 MIN: CPT | Mod: PBBFAC,PO | Performed by: INTERNAL MEDICINE

## 2020-12-08 ENCOUNTER — HOSPITAL ENCOUNTER (OUTPATIENT)
Dept: RADIOLOGY | Facility: HOSPITAL | Age: 65
Discharge: HOME | End: 2020-12-08
Attending: INTERNAL MEDICINE
Payer: MEDICARE

## 2020-12-08 DIAGNOSIS — Z12.31 ENCOUNTER FOR SCREENING MAMMOGRAM FOR MALIGNANT NEOPLASM OF BREAST: ICD-10-CM

## 2020-12-08 DIAGNOSIS — Z86.000 HISTORY OF DUCTAL CARCINOMA IN SITU (DCIS) OF BREAST: ICD-10-CM

## 2020-12-08 DIAGNOSIS — Z12.31 SCREENING MAMMOGRAM, ENCOUNTER FOR: ICD-10-CM

## 2020-12-08 PROCEDURE — 77067 SCR MAMMO BI INCL CAD: CPT | Mod: TC,PO

## 2020-12-08 PROCEDURE — 77063 MAMMO DIGITAL SCREENING BILAT WITH TOMO: ICD-10-PCS | Mod: 26,,, | Performed by: RADIOLOGY

## 2020-12-08 PROCEDURE — 77063 BREAST TOMOSYNTHESIS BI: CPT | Mod: 26,,, | Performed by: RADIOLOGY

## 2020-12-08 PROCEDURE — 77067 MAMMO DIGITAL SCREENING BILAT WITH TOMO: ICD-10-PCS | Mod: 26,,, | Performed by: RADIOLOGY

## 2020-12-08 PROCEDURE — 77067 SCR MAMMO BI INCL CAD: CPT | Mod: 26,,, | Performed by: RADIOLOGY

## 2020-12-10 NOTE — PROGRESS NOTES
Subjective:       Patient ID: Jeanine Chandler is a 65 y.o. female.    Chief Complaint: No chief complaint on file.    HPI Ms. 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  She had lumpectomy in 05/2005 and was treated on protocol NSABP     B-35 (Arimidex versus tamoxifen).  She completed that therapy in 09/2010.      She reports no new issues.      Review of Systems   Constitutional: Negative for activity change, appetite change and unexpected weight change.   Respiratory: Negative for cough and shortness of breath.    Cardiovascular: Negative for chest pain.   Gastrointestinal: Negative for abdominal pain, nausea and vomiting.   Musculoskeletal: Negative for back pain and neck pain.   Neurological: Negative for headaches.   Psychiatric/Behavioral: Negative for dysphoric mood. The patient is not nervous/anxious.        Objective:      Physical Exam  Constitutional:       Appearance: She is well-developed.   Cardiovascular:      Rate and Rhythm: Normal rate and regular rhythm.      Heart sounds: Normal heart sounds.   Pulmonary:      Effort: Pulmonary effort is normal.      Breath sounds: Normal breath sounds. No wheezing or rales.   Chest:      Breasts:         Right: No mass, nipple discharge or skin change.         Left: No mass, nipple discharge or skin change.       Abdominal:      Palpations: Abdomen is soft. There is no mass.      Tenderness: There is no abdominal tenderness.   Psychiatric:         Behavior: Behavior normal.         Thought Content: Thought content normal.         Assessment:     mammograms  -  No evidence of malignancy  1. History of ductal carcinoma in situ (DCIS) of breast        Plan:       Return in one year for follow-up mammograms.

## 2020-12-14 ENCOUNTER — OFFICE VISIT (OUTPATIENT)
Dept: HEMATOLOGY/ONCOLOGY | Facility: CLINIC | Age: 65
End: 2020-12-14
Payer: MEDICARE

## 2020-12-14 VITALS
HEIGHT: 62 IN | BODY MASS INDEX: 33.31 KG/M2 | RESPIRATION RATE: 16 BRPM | HEART RATE: 97 BPM | TEMPERATURE: 98 F | WEIGHT: 181 LBS | SYSTOLIC BLOOD PRESSURE: 120 MMHG | OXYGEN SATURATION: 97 % | DIASTOLIC BLOOD PRESSURE: 87 MMHG

## 2020-12-14 DIAGNOSIS — Z86.000 HISTORY OF DUCTAL CARCINOMA IN SITU (DCIS) OF BREAST: Primary | ICD-10-CM

## 2020-12-14 DIAGNOSIS — Z12.31 ENCOUNTER FOR SCREENING MAMMOGRAM FOR MALIGNANT NEOPLASM OF BREAST: ICD-10-CM

## 2020-12-14 PROCEDURE — 99215 OFFICE O/P EST HI 40 MIN: CPT | Mod: PBBFAC | Performed by: INTERNAL MEDICINE

## 2020-12-14 PROCEDURE — 99213 OFFICE O/P EST LOW 20 MIN: CPT | Mod: S$PBB,,, | Performed by: INTERNAL MEDICINE

## 2020-12-14 PROCEDURE — 99999 PR PBB SHADOW E&M-EST. PATIENT-LVL V: CPT | Mod: PBBFAC,,, | Performed by: INTERNAL MEDICINE

## 2020-12-14 PROCEDURE — 99213 PR OFFICE/OUTPT VISIT, EST, LEVL III, 20-29 MIN: ICD-10-PCS | Mod: S$PBB,,, | Performed by: INTERNAL MEDICINE

## 2020-12-14 PROCEDURE — 99999 PR PBB SHADOW E&M-EST. PATIENT-LVL V: ICD-10-PCS | Mod: PBBFAC,,, | Performed by: INTERNAL MEDICINE

## 2020-12-18 ENCOUNTER — TELEPHONE (OUTPATIENT)
Dept: GASTROENTEROLOGY | Facility: CLINIC | Age: 65
End: 2020-12-18

## 2020-12-18 NOTE — TELEPHONE ENCOUNTER
Spoke with pt and she does not wish to schedule at this time. She will call us back when she is ready to have this procedure. Phone number provided. PCP notified.

## 2021-02-12 DIAGNOSIS — I48.0 PAROXYSMAL ATRIAL FIBRILLATION: ICD-10-CM

## 2021-02-12 RX ORDER — METOPROLOL SUCCINATE 100 MG/1
TABLET, EXTENDED RELEASE ORAL
Qty: 90 TABLET | Refills: 0 | Status: SHIPPED | OUTPATIENT
Start: 2021-02-12 | End: 2021-06-01 | Stop reason: SDUPTHER

## 2021-02-25 ENCOUNTER — IMMUNIZATION (OUTPATIENT)
Dept: FAMILY MEDICINE | Facility: CLINIC | Age: 66
End: 2021-02-25
Payer: MEDICARE

## 2021-02-25 DIAGNOSIS — Z23 NEED FOR VACCINATION: Primary | ICD-10-CM

## 2021-02-25 PROCEDURE — 91300 COVID-19, MRNA, LNP-S, PF, 30 MCG/0.3 ML DOSE VACCINE: CPT | Mod: PBBFAC,PO

## 2021-03-18 ENCOUNTER — IMMUNIZATION (OUTPATIENT)
Dept: FAMILY MEDICINE | Facility: CLINIC | Age: 66
End: 2021-03-18
Payer: MEDICARE

## 2021-03-18 DIAGNOSIS — Z23 NEED FOR VACCINATION: Primary | ICD-10-CM

## 2021-03-18 PROCEDURE — 0002A COVID-19, MRNA, LNP-S, PF, 30 MCG/0.3 ML DOSE VACCINE: CPT | Mod: PBBFAC,PO

## 2021-03-18 PROCEDURE — 91300 COVID-19, MRNA, LNP-S, PF, 30 MCG/0.3 ML DOSE VACCINE: CPT | Mod: PBBFAC,PO

## 2021-04-06 ENCOUNTER — PATIENT MESSAGE (OUTPATIENT)
Dept: ADMINISTRATIVE | Facility: HOSPITAL | Age: 66
End: 2021-04-06

## 2021-04-10 DIAGNOSIS — F41.9 ANXIETY: ICD-10-CM

## 2021-04-12 RX ORDER — ESCITALOPRAM OXALATE 20 MG/1
TABLET ORAL
Qty: 90 TABLET | Refills: 0 | Status: SHIPPED | OUTPATIENT
Start: 2021-04-12 | End: 2021-06-01 | Stop reason: SDUPTHER

## 2021-05-04 DIAGNOSIS — I10 ESSENTIAL HYPERTENSION: ICD-10-CM

## 2021-05-04 RX ORDER — LOSARTAN POTASSIUM AND HYDROCHLOROTHIAZIDE 12.5; 5 MG/1; MG/1
1 TABLET ORAL DAILY
Qty: 90 TABLET | Refills: 0 | Status: SHIPPED | OUTPATIENT
Start: 2021-05-04 | End: 2021-06-01 | Stop reason: SDUPTHER

## 2021-05-12 DIAGNOSIS — I48.0 PAROXYSMAL ATRIAL FIBRILLATION: ICD-10-CM

## 2021-05-17 ENCOUNTER — PATIENT OUTREACH (OUTPATIENT)
Dept: ADMINISTRATIVE | Facility: HOSPITAL | Age: 66
End: 2021-05-17

## 2021-05-17 ENCOUNTER — PATIENT MESSAGE (OUTPATIENT)
Dept: ADMINISTRATIVE | Facility: HOSPITAL | Age: 66
End: 2021-05-17

## 2021-05-26 ENCOUNTER — PES CALL (OUTPATIENT)
Dept: ADMINISTRATIVE | Facility: CLINIC | Age: 66
End: 2021-05-26

## 2021-05-28 RX ORDER — METOPROLOL SUCCINATE 100 MG/1
100 TABLET, EXTENDED RELEASE ORAL DAILY
Qty: 90 TABLET | Refills: 0 | Status: CANCELLED | OUTPATIENT
Start: 2021-05-28

## 2021-06-01 ENCOUNTER — OFFICE VISIT (OUTPATIENT)
Dept: FAMILY MEDICINE | Facility: CLINIC | Age: 66
End: 2021-06-01
Payer: MEDICARE

## 2021-06-01 VITALS
OXYGEN SATURATION: 99 % | SYSTOLIC BLOOD PRESSURE: 136 MMHG | WEIGHT: 168 LBS | HEIGHT: 61 IN | TEMPERATURE: 99 F | DIASTOLIC BLOOD PRESSURE: 80 MMHG | BODY MASS INDEX: 31.72 KG/M2 | HEART RATE: 97 BPM

## 2021-06-01 DIAGNOSIS — Z78.0 POST-MENOPAUSAL: ICD-10-CM

## 2021-06-01 DIAGNOSIS — I48.0 PAROXYSMAL ATRIAL FIBRILLATION: ICD-10-CM

## 2021-06-01 DIAGNOSIS — Z86.010 HISTORY OF COLON POLYPS: ICD-10-CM

## 2021-06-01 DIAGNOSIS — Z79.4 TYPE 2 DIABETES MELLITUS WITH BOTH EYES AFFECTED BY RETINOPATHY WITHOUT MACULAR EDEMA, WITH LONG-TERM CURRENT USE OF INSULIN, UNSPECIFIED RETINOPATHY SEVERITY: ICD-10-CM

## 2021-06-01 DIAGNOSIS — F41.9 ANXIETY: ICD-10-CM

## 2021-06-01 DIAGNOSIS — E11.319 TYPE 2 DIABETES MELLITUS WITH BOTH EYES AFFECTED BY RETINOPATHY WITHOUT MACULAR EDEMA, WITH LONG-TERM CURRENT USE OF INSULIN, UNSPECIFIED RETINOPATHY SEVERITY: ICD-10-CM

## 2021-06-01 DIAGNOSIS — E78.2 MIXED HYPERLIPIDEMIA: ICD-10-CM

## 2021-06-01 DIAGNOSIS — L85.3 DRY SKIN DERMATITIS: ICD-10-CM

## 2021-06-01 DIAGNOSIS — I10 ESSENTIAL HYPERTENSION: ICD-10-CM

## 2021-06-01 DIAGNOSIS — G47.10 HYPERSOMNOLENCE: ICD-10-CM

## 2021-06-01 DIAGNOSIS — T78.40XA ALLERGIC REACTION, INITIAL ENCOUNTER: Primary | ICD-10-CM

## 2021-06-01 PROCEDURE — 99215 OFFICE O/P EST HI 40 MIN: CPT | Mod: PBBFAC,PO | Performed by: FAMILY MEDICINE

## 2021-06-01 PROCEDURE — 99999 PR PBB SHADOW E&M-EST. PATIENT-LVL V: ICD-10-PCS | Mod: PBBFAC,,, | Performed by: FAMILY MEDICINE

## 2021-06-01 PROCEDURE — 99214 OFFICE O/P EST MOD 30 MIN: CPT | Mod: S$PBB,,, | Performed by: FAMILY MEDICINE

## 2021-06-01 PROCEDURE — 99214 PR OFFICE/OUTPT VISIT, EST, LEVL IV, 30-39 MIN: ICD-10-PCS | Mod: S$PBB,,, | Performed by: FAMILY MEDICINE

## 2021-06-01 PROCEDURE — 99999 PR PBB SHADOW E&M-EST. PATIENT-LVL V: CPT | Mod: PBBFAC,,, | Performed by: FAMILY MEDICINE

## 2021-06-01 RX ORDER — RIVAROXABAN 20 MG/1
20 TABLET, FILM COATED ORAL
Qty: 90 TABLET | Refills: 3 | Status: SHIPPED | OUTPATIENT
Start: 2021-06-01 | End: 2022-06-19

## 2021-06-01 RX ORDER — LOSARTAN POTASSIUM AND HYDROCHLOROTHIAZIDE 12.5; 5 MG/1; MG/1
1 TABLET ORAL DAILY
Qty: 90 TABLET | Refills: 3 | Status: SHIPPED | OUTPATIENT
Start: 2021-06-01 | End: 2022-06-15

## 2021-06-01 RX ORDER — ESCITALOPRAM OXALATE 20 MG/1
20 TABLET ORAL DAILY
Qty: 90 TABLET | Refills: 3 | Status: SHIPPED | OUTPATIENT
Start: 2021-06-01 | End: 2022-03-24 | Stop reason: SDUPTHER

## 2021-06-01 RX ORDER — EPINEPHRINE 0.3 MG/.3ML
1 INJECTION SUBCUTANEOUS ONCE
Qty: 0.3 ML | Refills: 4 | Status: SHIPPED | OUTPATIENT
Start: 2021-06-01 | End: 2022-11-10

## 2021-06-01 RX ORDER — SEMAGLUTIDE 1.34 MG/ML
0.5 INJECTION, SOLUTION SUBCUTANEOUS
Qty: 3 PEN | Refills: 3 | Status: SHIPPED | OUTPATIENT
Start: 2021-06-01 | End: 2021-12-01

## 2021-06-01 RX ORDER — METFORMIN HYDROCHLORIDE 1000 MG/1
1000 TABLET ORAL 2 TIMES DAILY WITH MEALS
Qty: 180 TABLET | Refills: 3 | Status: SHIPPED | OUTPATIENT
Start: 2021-06-01 | End: 2022-08-12

## 2021-06-01 RX ORDER — METOPROLOL SUCCINATE 100 MG/1
100 TABLET, EXTENDED RELEASE ORAL DAILY
Qty: 90 TABLET | Refills: 3 | Status: SHIPPED | OUTPATIENT
Start: 2021-06-01 | End: 2022-03-24 | Stop reason: SDUPTHER

## 2021-06-01 RX ORDER — INSULIN DETEMIR 100 [IU]/ML
34 INJECTION, SOLUTION SUBCUTANEOUS NIGHTLY
Qty: 30 ML | Refills: 3 | Status: SHIPPED | OUTPATIENT
Start: 2021-06-01 | End: 2022-08-03

## 2021-06-01 RX ORDER — ATORVASTATIN CALCIUM 40 MG/1
40 TABLET, FILM COATED ORAL DAILY
Qty: 90 TABLET | Refills: 3 | Status: SHIPPED | OUTPATIENT
Start: 2021-06-01 | End: 2022-06-15

## 2021-06-02 ENCOUNTER — TELEPHONE (OUTPATIENT)
Dept: GASTROENTEROLOGY | Facility: CLINIC | Age: 66
End: 2021-06-02

## 2021-06-04 RX ORDER — METOPROLOL SUCCINATE 100 MG/1
TABLET, EXTENDED RELEASE ORAL
Qty: 90 TABLET | Refills: 0 | OUTPATIENT
Start: 2021-06-04

## 2021-06-08 DIAGNOSIS — L30.9 ECZEMA, UNSPECIFIED TYPE: ICD-10-CM

## 2021-06-08 RX ORDER — CLOTRIMAZOLE AND BETAMETHASONE DIPROPIONATE 10; .64 MG/G; MG/G
CREAM TOPICAL 2 TIMES DAILY
Qty: 45 G | Refills: 2 | Status: SHIPPED | OUTPATIENT
Start: 2021-06-08 | End: 2022-10-10 | Stop reason: SDUPTHER

## 2021-06-17 ENCOUNTER — PATIENT OUTREACH (OUTPATIENT)
Dept: ADMINISTRATIVE | Facility: OTHER | Age: 66
End: 2021-06-17

## 2021-06-21 ENCOUNTER — HOSPITAL ENCOUNTER (OUTPATIENT)
Dept: RADIOLOGY | Facility: HOSPITAL | Age: 66
Discharge: HOME | End: 2021-06-21
Attending: FAMILY MEDICINE
Payer: MEDICARE

## 2021-06-21 ENCOUNTER — OFFICE VISIT (OUTPATIENT)
Dept: PODIATRY | Facility: CLINIC | Age: 66
End: 2021-06-21
Payer: MEDICARE

## 2021-06-21 VITALS
SYSTOLIC BLOOD PRESSURE: 135 MMHG | HEIGHT: 61 IN | WEIGHT: 168 LBS | BODY MASS INDEX: 31.72 KG/M2 | DIASTOLIC BLOOD PRESSURE: 94 MMHG | HEART RATE: 128 BPM

## 2021-06-21 DIAGNOSIS — Z78.0 POST-MENOPAUSAL: ICD-10-CM

## 2021-06-21 DIAGNOSIS — E11.9 COMPREHENSIVE DIABETIC FOOT EXAMINATION, TYPE 2 DM, ENCOUNTER FOR: ICD-10-CM

## 2021-06-21 PROCEDURE — 77080 DXA BONE DENSITY AXIAL: CPT | Mod: TC,PO

## 2021-06-21 PROCEDURE — 99999 PR PBB SHADOW E&M-EST. PATIENT-LVL V: CPT | Mod: PBBFAC,,, | Performed by: PODIATRIST

## 2021-06-21 PROCEDURE — 99202 OFFICE O/P NEW SF 15 MIN: CPT | Mod: S$PBB,,, | Performed by: PODIATRIST

## 2021-06-21 PROCEDURE — 99999 PR PBB SHADOW E&M-EST. PATIENT-LVL V: ICD-10-PCS | Mod: PBBFAC,,, | Performed by: PODIATRIST

## 2021-06-21 PROCEDURE — 99215 OFFICE O/P EST HI 40 MIN: CPT | Mod: PBBFAC,PN | Performed by: PODIATRIST

## 2021-06-21 PROCEDURE — 77080 DEXA BONE DENSITY SPINE HIP: ICD-10-PCS | Mod: 26,,, | Performed by: RADIOLOGY

## 2021-06-21 PROCEDURE — 99202 PR OFFICE/OUTPT VISIT, NEW, LEVL II, 15-29 MIN: ICD-10-PCS | Mod: S$PBB,,, | Performed by: PODIATRIST

## 2021-06-21 PROCEDURE — 77080 DXA BONE DENSITY AXIAL: CPT | Mod: 26,,, | Performed by: RADIOLOGY

## 2021-07-07 ENCOUNTER — PATIENT MESSAGE (OUTPATIENT)
Dept: ADMINISTRATIVE | Facility: HOSPITAL | Age: 66
End: 2021-07-07

## 2021-07-09 ENCOUNTER — TELEPHONE (OUTPATIENT)
Dept: GASTROENTEROLOGY | Facility: CLINIC | Age: 66
End: 2021-07-09

## 2021-07-19 ENCOUNTER — TELEPHONE (OUTPATIENT)
Dept: GASTROENTEROLOGY | Facility: CLINIC | Age: 66
End: 2021-07-19

## 2021-08-04 ENCOUNTER — PATIENT MESSAGE (OUTPATIENT)
Dept: ADMINISTRATIVE | Facility: HOSPITAL | Age: 66
End: 2021-08-04

## 2021-08-23 ENCOUNTER — CLINICAL SUPPORT (OUTPATIENT)
Dept: CARDIOLOGY | Facility: HOSPITAL | Age: 66
End: 2021-08-23
Attending: INTERNAL MEDICINE
Payer: MEDICARE

## 2021-08-23 VITALS — WEIGHT: 167 LBS | HEIGHT: 61 IN | BODY MASS INDEX: 31.53 KG/M2

## 2021-08-23 DIAGNOSIS — I48.0 PAROXYSMAL ATRIAL FIBRILLATION: ICD-10-CM

## 2021-08-23 PROCEDURE — 93306 TTE W/DOPPLER COMPLETE: CPT | Mod: 26,,, | Performed by: INTERNAL MEDICINE

## 2021-08-23 PROCEDURE — 93227 HOLTER MONITOR - 24 HOUR (CUPID ONLY): ICD-10-PCS | Mod: ,,, | Performed by: INTERNAL MEDICINE

## 2021-08-23 PROCEDURE — 93306 TTE W/DOPPLER COMPLETE: CPT | Mod: PO

## 2021-08-23 PROCEDURE — 93226 XTRNL ECG REC<48 HR SCAN A/R: CPT | Mod: PO

## 2021-08-23 PROCEDURE — 93227 XTRNL ECG REC<48 HR R&I: CPT | Mod: ,,, | Performed by: INTERNAL MEDICINE

## 2021-08-23 PROCEDURE — 93306 ECHO (CUPID ONLY): ICD-10-PCS | Mod: 26,,, | Performed by: INTERNAL MEDICINE

## 2021-08-24 LAB
ASCENDING AORTA: 2.53 CM
AV INDEX (PROSTH): 0.64
AV MEAN GRADIENT: 6 MMHG
AV PEAK GRADIENT: 10 MMHG
AV VALVE AREA: 2.2 CM2
AV VELOCITY RATIO: 0.62
BSA FOR ECHO PROCEDURE: 1.81 M2
CV ECHO LV RWT: 0.47 CM
DOP CALC AO PEAK VEL: 1.59 M/S
DOP CALC AO VTI: 37.31 CM
DOP CALC LVOT AREA: 3.4 CM2
DOP CALC LVOT DIAMETER: 2.09 CM
DOP CALC LVOT PEAK VEL: 0.98 M/S
DOP CALC LVOT STROKE VOLUME: 81.95 CM3
DOP CALCLVOT PEAK VEL VTI: 23.9 CM
E WAVE DECELERATION TIME: 154.7 MSEC
E/A RATIO: 0.87
E/E' RATIO: 13.47 M/S
ECHO LV POSTERIOR WALL: 0.98 CM (ref 0.6–1.1)
EJECTION FRACTION: 65 %
FRACTIONAL SHORTENING: 38 % (ref 28–44)
INTERVENTRICULAR SEPTUM: 1.03 CM (ref 0.6–1.1)
LA MAJOR: 4.99 CM
LA MINOR: 4.9 CM
LA WIDTH: 4.08 CM
LEFT ATRIUM SIZE: 3.51 CM
LEFT ATRIUM VOLUME INDEX: 34.4 ML/M2
LEFT ATRIUM VOLUME: 60.19 CM3
LEFT INTERNAL DIMENSION IN SYSTOLE: 2.59 CM (ref 2.1–4)
LEFT VENTRICLE DIASTOLIC VOLUME INDEX: 44.16 ML/M2
LEFT VENTRICLE DIASTOLIC VOLUME: 77.28 ML
LEFT VENTRICLE MASS INDEX: 78 G/M2
LEFT VENTRICLE SYSTOLIC VOLUME INDEX: 14 ML/M2
LEFT VENTRICLE SYSTOLIC VOLUME: 24.44 ML
LEFT VENTRICULAR INTERNAL DIMENSION IN DIASTOLE: 4.17 CM (ref 3.5–6)
LEFT VENTRICULAR MASS: 136.65 G
LV LATERAL E/E' RATIO: 11.22 M/S
LV SEPTAL E/E' RATIO: 16.83 M/S
MV A" WAVE DURATION": 11.23 MSEC
MV PEAK A VEL: 1.16 M/S
MV PEAK E VEL: 1.01 M/S
MV STENOSIS PRESSURE HALF TIME: 44.86 MS
MV VALVE AREA P 1/2 METHOD: 4.9 CM2
PULM VEIN S/D RATIO: 1.18
PV PEAK D VEL: 0.55 M/S
PV PEAK S VEL: 0.65 M/S
RA MAJOR: 3.87 CM
RA PRESSURE: 3 MMHG
RA WIDTH: 2.4 CM
RIGHT VENTRICULAR END-DIASTOLIC DIMENSION: 2.75 CM
RV TISSUE DOPPLER FREE WALL SYSTOLIC VELOCITY 1 (APICAL 4 CHAMBER VIEW): 12.96 CM/S
SINUS: 2.5 CM
STJ: 2.36 CM
TDI LATERAL: 0.09 M/S
TDI SEPTAL: 0.06 M/S
TDI: 0.08 M/S
TRICUSPID ANNULAR PLANE SYSTOLIC EXCURSION: 2.21 CM

## 2021-08-25 ENCOUNTER — PATIENT OUTREACH (OUTPATIENT)
Dept: ADMINISTRATIVE | Facility: OTHER | Age: 66
End: 2021-08-25

## 2021-08-25 LAB
OHS CV EVENT MONITOR DAY: 0
OHS CV HOLTER LENGTH DECIMAL HOURS: 24
OHS CV HOLTER LENGTH HOURS: 24
OHS CV HOLTER LENGTH MINUTES: 0
OHS CV HOLTER SINUS AVERAGE HR: 85
OHS CV HOLTER SINUS MAX HR: 143
OHS CV HOLTER SINUS MIN HR: 59

## 2021-09-20 ENCOUNTER — TELEPHONE (OUTPATIENT)
Dept: CARDIOLOGY | Facility: CLINIC | Age: 66
End: 2021-09-20

## 2021-09-29 ENCOUNTER — IMMUNIZATION (OUTPATIENT)
Dept: FAMILY MEDICINE | Facility: CLINIC | Age: 66
End: 2021-09-29
Payer: MEDICARE

## 2021-09-29 DIAGNOSIS — Z23 NEED FOR VACCINATION: Primary | ICD-10-CM

## 2021-09-29 PROCEDURE — 0003A COVID-19, MRNA, LNP-S, PF, 30 MCG/0.3 ML DOSE VACCINE: CPT | Mod: PBBFAC | Performed by: FAMILY MEDICINE

## 2021-09-29 PROCEDURE — 91300 COVID-19, MRNA, LNP-S, PF, 30 MCG/0.3 ML DOSE VACCINE: CPT | Mod: PBBFAC,PO

## 2021-11-03 ENCOUNTER — PATIENT MESSAGE (OUTPATIENT)
Dept: ADMINISTRATIVE | Facility: HOSPITAL | Age: 66
End: 2021-11-03
Payer: OTHER GOVERNMENT

## 2021-11-24 ENCOUNTER — TELEPHONE (OUTPATIENT)
Dept: HEMATOLOGY/ONCOLOGY | Facility: CLINIC | Age: 66
End: 2021-11-24
Payer: OTHER GOVERNMENT

## 2021-11-24 ENCOUNTER — PATIENT OUTREACH (OUTPATIENT)
Dept: ADMINISTRATIVE | Facility: HOSPITAL | Age: 66
End: 2021-11-24
Payer: OTHER GOVERNMENT

## 2021-11-24 DIAGNOSIS — Z79.4 TYPE 2 DIABETES MELLITUS WITH BOTH EYES AFFECTED BY RETINOPATHY WITHOUT MACULAR EDEMA, WITH LONG-TERM CURRENT USE OF INSULIN, UNSPECIFIED RETINOPATHY SEVERITY: Primary | ICD-10-CM

## 2021-11-24 DIAGNOSIS — Z86.000 HISTORY OF DUCTAL CARCINOMA IN SITU (DCIS) OF BREAST: Primary | ICD-10-CM

## 2021-11-24 DIAGNOSIS — E11.319 TYPE 2 DIABETES MELLITUS WITH BOTH EYES AFFECTED BY RETINOPATHY WITHOUT MACULAR EDEMA, WITH LONG-TERM CURRENT USE OF INSULIN, UNSPECIFIED RETINOPATHY SEVERITY: Primary | ICD-10-CM

## 2021-11-24 DIAGNOSIS — Z12.31 ENCOUNTER FOR SCREENING MAMMOGRAM FOR MALIGNANT NEOPLASM OF BREAST: ICD-10-CM

## 2021-11-29 DIAGNOSIS — E11.319 TYPE 2 DIABETES MELLITUS WITH BOTH EYES AFFECTED BY RETINOPATHY WITHOUT MACULAR EDEMA, WITH LONG-TERM CURRENT USE OF INSULIN, UNSPECIFIED RETINOPATHY SEVERITY: ICD-10-CM

## 2021-11-29 DIAGNOSIS — Z79.4 TYPE 2 DIABETES MELLITUS WITH BOTH EYES AFFECTED BY RETINOPATHY WITHOUT MACULAR EDEMA, WITH LONG-TERM CURRENT USE OF INSULIN, UNSPECIFIED RETINOPATHY SEVERITY: ICD-10-CM

## 2021-11-30 ENCOUNTER — PATIENT OUTREACH (OUTPATIENT)
Dept: ADMINISTRATIVE | Facility: OTHER | Age: 66
End: 2021-11-30
Payer: OTHER GOVERNMENT

## 2021-12-01 RX ORDER — SEMAGLUTIDE 1.34 MG/ML
0.5 INJECTION, SOLUTION SUBCUTANEOUS
Qty: 1 PEN | Refills: 0 | Status: SHIPPED | OUTPATIENT
Start: 2021-12-01 | End: 2021-12-09 | Stop reason: SDUPTHER

## 2021-12-09 DIAGNOSIS — E11.319 TYPE 2 DIABETES MELLITUS WITH BOTH EYES AFFECTED BY RETINOPATHY WITHOUT MACULAR EDEMA, WITH LONG-TERM CURRENT USE OF INSULIN, UNSPECIFIED RETINOPATHY SEVERITY: ICD-10-CM

## 2021-12-09 DIAGNOSIS — Z79.4 TYPE 2 DIABETES MELLITUS WITH BOTH EYES AFFECTED BY RETINOPATHY WITHOUT MACULAR EDEMA, WITH LONG-TERM CURRENT USE OF INSULIN, UNSPECIFIED RETINOPATHY SEVERITY: ICD-10-CM

## 2021-12-09 RX ORDER — SEMAGLUTIDE 1.34 MG/ML
0.5 INJECTION, SOLUTION SUBCUTANEOUS
Qty: 3 PEN | Refills: 3 | Status: SHIPPED | OUTPATIENT
Start: 2021-12-09 | End: 2022-03-20

## 2021-12-10 ENCOUNTER — TELEPHONE (OUTPATIENT)
Dept: ENDOCRINOLOGY | Facility: CLINIC | Age: 66
End: 2021-12-10
Payer: OTHER GOVERNMENT

## 2021-12-16 ENCOUNTER — HOSPITAL ENCOUNTER (OUTPATIENT)
Dept: RADIOLOGY | Facility: HOSPITAL | Age: 66
Discharge: HOME | End: 2021-12-16
Attending: INTERNAL MEDICINE
Payer: MEDICARE

## 2021-12-16 DIAGNOSIS — Z12.31 ENCOUNTER FOR SCREENING MAMMOGRAM FOR MALIGNANT NEOPLASM OF BREAST: ICD-10-CM

## 2021-12-16 DIAGNOSIS — Z86.000 HISTORY OF DUCTAL CARCINOMA IN SITU (DCIS) OF BREAST: ICD-10-CM

## 2021-12-16 PROCEDURE — 77063 BREAST TOMOSYNTHESIS BI: CPT | Mod: 26,,, | Performed by: RADIOLOGY

## 2021-12-16 PROCEDURE — 77067 SCR MAMMO BI INCL CAD: CPT | Mod: TC,PO

## 2021-12-16 PROCEDURE — 77067 MAMMO DIGITAL SCREENING BILAT WITH TOMO: ICD-10-PCS | Mod: 26,,, | Performed by: RADIOLOGY

## 2021-12-16 PROCEDURE — 77067 SCR MAMMO BI INCL CAD: CPT | Mod: 26,,, | Performed by: RADIOLOGY

## 2021-12-16 PROCEDURE — 77063 MAMMO DIGITAL SCREENING BILAT WITH TOMO: ICD-10-PCS | Mod: 26,,, | Performed by: RADIOLOGY

## 2022-01-06 ENCOUNTER — PATIENT OUTREACH (OUTPATIENT)
Dept: ADMINISTRATIVE | Facility: OTHER | Age: 67
End: 2022-01-06
Payer: MEDICARE

## 2022-01-06 ENCOUNTER — IMMUNIZATION (OUTPATIENT)
Dept: PHARMACY | Facility: CLINIC | Age: 67
End: 2022-01-06
Payer: MEDICARE

## 2022-01-06 ENCOUNTER — OFFICE VISIT (OUTPATIENT)
Dept: CARDIOLOGY | Facility: CLINIC | Age: 67
End: 2022-01-06
Payer: MEDICARE

## 2022-01-06 VITALS
OXYGEN SATURATION: 100 % | HEIGHT: 61 IN | WEIGHT: 168.19 LBS | BODY MASS INDEX: 31.75 KG/M2 | DIASTOLIC BLOOD PRESSURE: 70 MMHG | HEART RATE: 97 BPM | SYSTOLIC BLOOD PRESSURE: 146 MMHG

## 2022-01-06 DIAGNOSIS — E78.2 MIXED HYPERLIPIDEMIA: ICD-10-CM

## 2022-01-06 DIAGNOSIS — I48.0 PAROXYSMAL ATRIAL FIBRILLATION: Primary | ICD-10-CM

## 2022-01-06 DIAGNOSIS — I10 ESSENTIAL HYPERTENSION: ICD-10-CM

## 2022-01-06 PROCEDURE — 99214 PR OFFICE/OUTPT VISIT, EST, LEVL IV, 30-39 MIN: ICD-10-PCS | Mod: S$GLB,,, | Performed by: INTERNAL MEDICINE

## 2022-01-06 PROCEDURE — 99999 PR PBB SHADOW E&M-EST. PATIENT-LVL IV: ICD-10-PCS | Mod: PBBFAC,,, | Performed by: INTERNAL MEDICINE

## 2022-01-06 PROCEDURE — 99214 OFFICE O/P EST MOD 30 MIN: CPT | Mod: S$GLB,,, | Performed by: INTERNAL MEDICINE

## 2022-01-06 PROCEDURE — 99214 OFFICE O/P EST MOD 30 MIN: CPT | Mod: PBBFAC,PO | Performed by: INTERNAL MEDICINE

## 2022-01-06 PROCEDURE — 99999 PR PBB SHADOW E&M-EST. PATIENT-LVL IV: CPT | Mod: PBBFAC,,, | Performed by: INTERNAL MEDICINE

## 2022-01-06 NOTE — PROGRESS NOTES
LINKS immunization registry updated  Care Everywhere updated  Health Maintenance updated  Chart reviewed for overdue Proactive [facility] Encounters ([facility]) health maintenance testing (CRS, Breast Ca, Diabetic Eye Exam)   Orders entered:N/A

## 2022-01-06 NOTE — PROGRESS NOTES
"Subjective:    Patient ID:  Jeanine Chandler is a 66 y.o. female who presents for follow-up of Follow-up      Problem List Items Addressed This Visit        Cardiac/Vascular    Essential hypertension    Mixed hyperlipidemia    Paroxysmal atrial fibrillation - Primary          HPI    Patient was last seen on 11/25/2020 at which time she was doing okay from a cardiac standpoint.  24 hour Holter monitor was ordered to obtain average heart rate in addition to echocardiogram.  Echocardiogram showed preserved ejection fraction without acute abnormalities.  Holter monitor showed occasional episodes of atrial fibrillation with average heart rate of 85.    On assessment today, the patient states that she feels OK today.    No chest pain.      Review of Systems   Constitutional: Negative for decreased appetite, fever and malaise/fatigue.   Eyes: Negative for blurred vision.   Cardiovascular: Negative for chest pain, dyspnea on exertion, irregular heartbeat and leg swelling.   Respiratory: Negative for cough, hemoptysis, shortness of breath and wheezing.    Endocrine: Negative for cold intolerance and heat intolerance.   Hematologic/Lymphatic: Negative for bleeding problem.   Musculoskeletal: Negative for muscle weakness and myalgias.   Gastrointestinal: Negative for abdominal pain, constipation and diarrhea.   Genitourinary: Negative for bladder incontinence.   Neurological: Negative for dizziness and weakness.   Psychiatric/Behavioral: Negative for depression.        Objective:     Vitals:    01/06/22 1336   BP: (!) 146/70   BP Location: Right arm   Patient Position: Sitting   BP Method: Medium (Automatic)   Pulse: 97   SpO2: 100%   Weight: 76.3 kg (168 lb 3.4 oz)   Height: 5' 1" (1.549 m)        Physical Exam  Vitals and nursing note reviewed.   Constitutional:       General: She is not in acute distress.     Appearance: She is well-developed and well-nourished.   HENT:      Head: Normocephalic and atraumatic.   Neck:      " Vascular: No JVD.   Cardiovascular:      Rate and Rhythm: Normal rate and regular rhythm.      Heart sounds: Normal heart sounds. No murmur heard.  No friction rub. No gallop.    Pulmonary:      Effort: Pulmonary effort is normal. No respiratory distress.      Breath sounds: Normal breath sounds. No wheezing or rales.   Abdominal:      General: Bowel sounds are normal.      Palpations: Abdomen is soft.      Tenderness: There is no abdominal tenderness. There is no guarding or rebound.   Musculoskeletal:         General: No tenderness or edema.      Cervical back: Neck supple.   Skin:     General: Skin is warm and dry.   Neurological:      Mental Status: She is alert and oriented to person, place, and time.   Psychiatric:         Behavior: Behavior normal.             Current Outpatient Medications on File Prior to Visit   Medication Sig    albuterol (PROAIR HFA) 90 mcg/actuation inhaler Inhale 2 puffs into the lungs every 4 (four) hours as needed. 2 HFA Aerosol Inhaler Inhalation Every 4-6 hours    atorvastatin (LIPITOR) 40 MG tablet Take 1 tablet (40 mg total) by mouth once daily.    blood sugar diagnostic Strp To check BG 1 times daily, to use with insurance preferred meter. Dx code E11.319    blood-glucose meter kit To check BG 1 times daily, to use with insurance preferred meter.    clotrimazole-betamethasone 1-0.05% (LOTRISONE) cream Apply topically 2 (two) times daily.    EScitalopram oxalate (LEXAPRO) 20 MG tablet Take 1 tablet (20 mg total) by mouth once daily.    famotidine (PEPCID) 40 MG tablet TAKE 1 TABLET BY MOUTH EVERY DAY    fexofenadine (ALLEGRA) 180 MG tablet Take 180 mg by mouth once daily.    fluticasone propionate (FLOVENT HFA) 220 mcg/actuation inhaler Inhale 1 puff into the lungs 2 (two) times daily. Controller    insulin detemir U-100 (LEVEMIR FLEXTOUCH U-100 INSULN) 100 unit/mL (3 mL) InPn pen Inject 34 Units into the skin every evening.    lancets Misc To check BG 1 times daily,  "to use with insurance preferred meter.    losartan-hydrochlorothiazide 50-12.5 mg (HYZAAR) 50-12.5 mg per tablet Take 1 tablet by mouth once daily.    metFORMIN (GLUCOPHAGE) 1000 MG tablet Take 1 tablet (1,000 mg total) by mouth 2 (two) times daily with meals.    metoprolol succinate (TOPROL-XL) 100 MG 24 hr tablet Take 1 tablet (100 mg total) by mouth once daily.    pen needle, diabetic (BD ULTRA-FINE SHORT PEN NEEDLE) 31 gauge x 5/16" Ndle USE WITH INSULIN EVERY EVENING AS DIRECTED    rivaroxaban (XARELTO) 20 mg Tab Take 1 tablet (20 mg total) by mouth daily with dinner or evening meal.    semaglutide (OZEMPIC) 0.25 mg or 0.5 mg(2 mg/1.5 mL) pen injector Inject 0.5 mg into the skin every 7 days.    vitamin D (VITAMIN D3) 1000 units Tab Take 2,000 Units by mouth once daily.    EPINEPHrine (EPIPEN) 0.3 mg/0.3 mL AtIn Inject 0.3 mLs (0.3 mg total) into the muscle once. Pen Injector Intramuscular .  As directed for anaphylaxis PRN for 1 dose     No current facility-administered medications on file prior to visit.       Lipid Panel:   Lab Results   Component Value Date    CHOL 117 09/14/2020    HDL 36 (L) 09/14/2020    LDLCALC 53 09/14/2020    TRIG 223 (H) 09/14/2020    CHOLHDL 3.3 09/14/2020       The ASCVD Risk score ([place] MARGO Jr., et al., 2013) failed to calculate for the following reasons:    The valid total cholesterol range is 130 to 320 mg/dL    All pertinent labs, imaging, and EKGs reviewed.  Patient's most recent EKG tracing was personally interpreted by this provider.    Assessment:       1. Paroxysmal atrial fibrillation    2. Mixed hyperlipidemia    3. Essential hypertension         Plan:     Symptoms OK today  BP borderline today  Most recent echocardiogram and 24 hour Holter monitor reviewed personally     Continue Xarelto 20 mg PO daily.  Emphasized to the patient to take the medication with the largest meal of the day.     Continue metoprolol succinate 100 mg PO Daily   Continue atorvastatin to " 40 mg PO Daily   Start walking program     Continue other cardiac medications  Mediterranean Diet/Cardiovascular Exercise Program    Patient queried and all questions were answered.    F/u in 9 months to reassess      Signed:    Lul Wright MD  1/6/2022 8:32 AM

## 2022-01-18 ENCOUNTER — OFFICE VISIT (OUTPATIENT)
Dept: HEMATOLOGY/ONCOLOGY | Facility: CLINIC | Age: 67
End: 2022-01-18
Payer: MEDICARE

## 2022-01-18 VITALS
HEART RATE: 72 BPM | SYSTOLIC BLOOD PRESSURE: 131 MMHG | RESPIRATION RATE: 16 BRPM | TEMPERATURE: 97 F | BODY MASS INDEX: 32.51 KG/M2 | WEIGHT: 172.19 LBS | DIASTOLIC BLOOD PRESSURE: 91 MMHG | HEIGHT: 61 IN | OXYGEN SATURATION: 100 %

## 2022-01-18 DIAGNOSIS — Z12.31 ENCOUNTER FOR SCREENING MAMMOGRAM FOR HIGH-RISK PATIENT: ICD-10-CM

## 2022-01-18 DIAGNOSIS — Z86.000 HISTORY OF DUCTAL CARCINOMA IN SITU (DCIS) OF BREAST: Primary | ICD-10-CM

## 2022-01-18 PROCEDURE — 99213 OFFICE O/P EST LOW 20 MIN: CPT | Mod: S$GLB,,, | Performed by: INTERNAL MEDICINE

## 2022-01-18 PROCEDURE — 99999 PR PBB SHADOW E&M-EST. PATIENT-LVL V: ICD-10-PCS | Mod: PBBFAC,,, | Performed by: INTERNAL MEDICINE

## 2022-01-18 PROCEDURE — 99213 PR OFFICE/OUTPT VISIT, EST, LEVL III, 20-29 MIN: ICD-10-PCS | Mod: S$GLB,,, | Performed by: INTERNAL MEDICINE

## 2022-01-18 PROCEDURE — 99215 OFFICE O/P EST HI 40 MIN: CPT | Mod: PBBFAC | Performed by: INTERNAL MEDICINE

## 2022-01-18 PROCEDURE — 99999 PR PBB SHADOW E&M-EST. PATIENT-LVL V: CPT | Mod: PBBFAC,,, | Performed by: INTERNAL MEDICINE

## 2022-01-18 NOTE — PROGRESS NOTES
Subjective:       Patient ID: Jeanine Chandler is a 66 y.o. female.    Chief Complaint: No chief complaint on file.    HPI Ms. 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     Today she reports that she has been feeling well with no new issues.    History:   She had lumpectomy in 05/2005 and was treated on protocol NSABP     B-35 (Arimidex versus tamoxifen).  She completed that therapy in 09/2010.      Review of Systems   Constitutional: Negative for activity change, appetite change and unexpected weight change.   Respiratory: Negative for cough and shortness of breath.    Cardiovascular: Negative for chest pain.   Gastrointestinal: Negative for abdominal pain, nausea and vomiting.   Musculoskeletal: Negative for back pain and neck pain.   Neurological: Negative for headaches.   Psychiatric/Behavioral: Negative for dysphoric mood. The patient is not nervous/anxious.        Objective:      Physical Exam  Constitutional:       Appearance: She is well-developed.   Cardiovascular:      Rate and Rhythm: Normal rate and regular rhythm.      Heart sounds: Normal heart sounds.   Pulmonary:      Effort: Pulmonary effort is normal.      Breath sounds: Normal breath sounds. No wheezing or rales.   Chest:   Breasts:      Right: No mass, nipple discharge or skin change.      Left: No mass, nipple discharge or skin change.         Abdominal:      Palpations: Abdomen is soft. There is no mass.      Tenderness: There is no abdominal tenderness.   Psychiatric:         Behavior: Behavior normal.         Thought Content: Thought content normal.         Assessment:     mammograms 12/16/21 -  No evidence of malignancy  1. History of ductal carcinoma in situ (DCIS) of breast        Plan:       Return in one year for follow-up mammograms.

## 2022-02-02 ENCOUNTER — PATIENT MESSAGE (OUTPATIENT)
Dept: ADMINISTRATIVE | Facility: HOSPITAL | Age: 67
End: 2022-02-02
Payer: MEDICARE

## 2022-02-07 ENCOUNTER — PATIENT OUTREACH (OUTPATIENT)
Dept: ADMINISTRATIVE | Facility: HOSPITAL | Age: 67
End: 2022-02-07
Payer: MEDICARE

## 2022-02-07 ENCOUNTER — TELEPHONE (OUTPATIENT)
Dept: ADMINISTRATIVE | Facility: HOSPITAL | Age: 67
End: 2022-02-07
Payer: MEDICARE

## 2022-02-07 NOTE — TELEPHONE ENCOUNTER
I spoke with patient and she stated that she has tried calling on multiple occasions to schedule a colonoscopy. Please call patient to schedule as she stated she was supposed to have this done last year per PCP.

## 2022-02-07 NOTE — PROGRESS NOTES
Spoke with pt, scheduled labs for 4/25.  She is scheduled to see Jasmyn on 5/2 and stated that she is only allowed labs once a year so she did not want to schedule earlier.    Patient also stated that Dr Kirby wanted her to have a c-scope done last year and patient has tried to speak with someone in GI to schedule and has never heard back from anyone.  Advised I would send a message and have someone give her a call.

## 2022-04-25 ENCOUNTER — TELEPHONE (OUTPATIENT)
Dept: ENDOCRINOLOGY | Facility: CLINIC | Age: 67
End: 2022-04-25
Payer: MEDICARE

## 2022-04-25 ENCOUNTER — LAB VISIT (OUTPATIENT)
Dept: LAB | Facility: HOSPITAL | Age: 67
End: 2022-04-25
Attending: FAMILY MEDICINE
Payer: MEDICARE

## 2022-04-25 DIAGNOSIS — E11.319 TYPE 2 DIABETES MELLITUS WITH BOTH EYES AFFECTED BY RETINOPATHY WITHOUT MACULAR EDEMA, WITH LONG-TERM CURRENT USE OF INSULIN, UNSPECIFIED RETINOPATHY SEVERITY: ICD-10-CM

## 2022-04-25 DIAGNOSIS — Z79.4 TYPE 2 DIABETES MELLITUS WITH BOTH EYES AFFECTED BY RETINOPATHY WITHOUT MACULAR EDEMA, WITH LONG-TERM CURRENT USE OF INSULIN, UNSPECIFIED RETINOPATHY SEVERITY: ICD-10-CM

## 2022-04-25 LAB
CHOLEST SERPL-MCNC: 204 MG/DL (ref 120–199)
CHOLEST/HDLC SERPL: 4.9 {RATIO} (ref 2–5)
ESTIMATED AVG GLUCOSE: 283 MG/DL (ref 68–131)
HBA1C MFR BLD: 11.5 % (ref 4–5.6)
HDLC SERPL-MCNC: 42 MG/DL (ref 40–75)
HDLC SERPL: 20.6 % (ref 20–50)
LDLC SERPL CALC-MCNC: 112.6 MG/DL (ref 63–159)
NONHDLC SERPL-MCNC: 162 MG/DL
TRIGL SERPL-MCNC: 247 MG/DL (ref 30–150)

## 2022-04-25 PROCEDURE — 83036 HEMOGLOBIN GLYCOSYLATED A1C: CPT | Performed by: FAMILY MEDICINE

## 2022-04-25 PROCEDURE — 36415 COLL VENOUS BLD VENIPUNCTURE: CPT | Mod: PO | Performed by: FAMILY MEDICINE

## 2022-04-25 PROCEDURE — 80061 LIPID PANEL: CPT | Performed by: FAMILY MEDICINE

## 2022-04-25 NOTE — TELEPHONE ENCOUNTER
----- Message from Yelitza Wells sent at 4/25/2022  9:13 AM CDT -----  Regarding: orders  Contact: patient  Type: Needs Medical Advice  Who Called:  patient  Best Call Back Number: [phone] (home)   Additional Information: Patient is coming in for labs at 11:40 today. She needs orders for the labs needed from [facility]. Please call patient to advise.Thanks!

## 2022-04-25 NOTE — TELEPHONE ENCOUNTER
Tried to contact pt but no answer and no voicemail. Pt labs already scheduled can be used for upcoming [facility] appt (also added already ordered urine sample to current lab appt)

## 2022-05-02 ENCOUNTER — OFFICE VISIT (OUTPATIENT)
Dept: ENDOCRINOLOGY | Facility: CLINIC | Age: 67
End: 2022-05-02
Payer: MEDICARE

## 2022-05-02 ENCOUNTER — PATIENT MESSAGE (OUTPATIENT)
Dept: ENDOCRINOLOGY | Facility: CLINIC | Age: 67
End: 2022-05-02

## 2022-05-02 VITALS
WEIGHT: 170 LBS | HEIGHT: 61 IN | HEART RATE: 85 BPM | SYSTOLIC BLOOD PRESSURE: 110 MMHG | BODY MASS INDEX: 32.1 KG/M2 | DIASTOLIC BLOOD PRESSURE: 60 MMHG

## 2022-05-02 DIAGNOSIS — Z79.4 TYPE 2 DIABETES MELLITUS WITH BOTH EYES AFFECTED BY RETINOPATHY WITHOUT MACULAR EDEMA, WITH LONG-TERM CURRENT USE OF INSULIN, UNSPECIFIED RETINOPATHY SEVERITY: Primary | ICD-10-CM

## 2022-05-02 DIAGNOSIS — I10 ESSENTIAL HYPERTENSION: ICD-10-CM

## 2022-05-02 DIAGNOSIS — E11.319 TYPE 2 DIABETES MELLITUS WITH RETINOPATHY OF BOTH EYES, WITH LONG-TERM CURRENT USE OF INSULIN, MACULAR EDEMA PRESENCE UNSPECIFIED, UNSPECIFIED RETINOPATHY SEVERITY: ICD-10-CM

## 2022-05-02 DIAGNOSIS — E11.319 TYPE 2 DIABETES MELLITUS WITH BOTH EYES AFFECTED BY RETINOPATHY WITHOUT MACULAR EDEMA, WITH LONG-TERM CURRENT USE OF INSULIN, UNSPECIFIED RETINOPATHY SEVERITY: Primary | ICD-10-CM

## 2022-05-02 DIAGNOSIS — Z79.4 TYPE 2 DIABETES MELLITUS WITH RETINOPATHY OF BOTH EYES, WITH LONG-TERM CURRENT USE OF INSULIN, MACULAR EDEMA PRESENCE UNSPECIFIED, UNSPECIFIED RETINOPATHY SEVERITY: ICD-10-CM

## 2022-05-02 DIAGNOSIS — E78.2 MIXED HYPERLIPIDEMIA: ICD-10-CM

## 2022-05-02 DIAGNOSIS — E11.649 HYPOGLYCEMIA UNAWARENESS ASSOCIATED WITH TYPE 2 DIABETES MELLITUS: ICD-10-CM

## 2022-05-02 LAB — GLUCOSE SERPL-MCNC: 311 MG/DL (ref 70–110)

## 2022-05-02 PROCEDURE — U0005 INFEC AGEN DETEC AMPLI PROBE: HCPCS | Performed by: NURSE PRACTITIONER

## 2022-05-02 PROCEDURE — 99215 OFFICE O/P EST HI 40 MIN: CPT | Mod: S$GLB,,, | Performed by: NURSE PRACTITIONER

## 2022-05-02 PROCEDURE — 99215 PR OFFICE/OUTPT VISIT, EST, LEVL V, 40-54 MIN: ICD-10-PCS | Mod: S$GLB,,, | Performed by: NURSE PRACTITIONER

## 2022-05-02 PROCEDURE — U0003 INFECTIOUS AGENT DETECTION BY NUCLEIC ACID (DNA OR RNA); SEVERE ACUTE RESPIRATORY SYNDROME CORONAVIRUS 2 (SARS-COV-2) (CORONAVIRUS DISEASE [COVID-19]), AMPLIFIED PROBE TECHNIQUE, MAKING USE OF HIGH THROUGHPUT TECHNOLOGIES AS DESCRIBED BY CMS-2020-01-R: HCPCS | Performed by: NURSE PRACTITIONER

## 2022-05-02 PROCEDURE — 82962 POCT GLUCOSE, HAND-HELD DEVICE: ICD-10-PCS | Mod: S$GLB,,, | Performed by: NURSE PRACTITIONER

## 2022-05-02 PROCEDURE — 99999 PR PBB SHADOW E&M-EST. PATIENT-LVL IV: ICD-10-PCS | Mod: PBBFAC,,, | Performed by: NURSE PRACTITIONER

## 2022-05-02 PROCEDURE — 99999 PR PBB SHADOW E&M-EST. PATIENT-LVL IV: CPT | Mod: PBBFAC,,, | Performed by: NURSE PRACTITIONER

## 2022-05-02 PROCEDURE — 82962 GLUCOSE BLOOD TEST: CPT | Mod: S$GLB,,, | Performed by: NURSE PRACTITIONER

## 2022-05-02 RX ORDER — BLOOD-GLUCOSE TRANSMITTER
EACH MISCELLANEOUS
Qty: 1 EACH | Refills: 2 | Status: SHIPPED | OUTPATIENT
Start: 2022-05-02

## 2022-05-02 RX ORDER — INSULIN ASPART 100 [IU]/ML
8 INJECTION, SOLUTION INTRAVENOUS; SUBCUTANEOUS
Qty: 45 ML | Refills: 2 | Status: SHIPPED | OUTPATIENT
Start: 2022-05-02 | End: 2022-08-12

## 2022-05-02 RX ORDER — PEN NEEDLE, DIABETIC 30 GX3/16"
NEEDLE, DISPOSABLE MISCELLANEOUS
Qty: 400 EACH | Refills: 3 | Status: SHIPPED | OUTPATIENT
Start: 2022-05-02 | End: 2023-12-14 | Stop reason: SDUPTHER

## 2022-05-02 RX ORDER — BLOOD-GLUCOSE SENSOR
EACH MISCELLANEOUS
Qty: 3 EACH | Refills: 6 | Status: SHIPPED | OUTPATIENT
Start: 2022-05-02 | End: 2022-05-04 | Stop reason: SDUPTHER

## 2022-05-02 NOTE — PROGRESS NOTES
"CC: Ms. Jeanine Chandler arrives today for management of Type 2 DM and review of chronic medical conditions, as listed in the Visit Diagnosis section of this encounter.       HPI: Ms. Jeanine Chandler was diagnosed with Type 2 DM in her late 40s. She was diagnosed based on lab work. Initial treatment consisted of metformin and insulin was added a few years later. + FH of DM in mother and sister. Denies hospitalizations due to DM.     Patient was last seen by me in September 2020.     Patient is 24 min late for her 30 min appt so only urgent needs will be addressed.    She had lab work for her PCP last week. A1c now 11.5%.    Not checking BG. She refuses to do so. Previously, she did not meet criteria for coverage for Dexcom G6. Freestyle Krupa was not covered by her insurance either.     Hypoglycemia: No    Missing Insulin/PO medication doses: No    Exercise: No    Dietary Habits: Eats 3 meals/day. Grazes in between. Avoids sugary beverages.     Last DM education appointment: 4/9/2019      CURRENT DIABETIC MEDS: metformin 1000 mg BID, Ozempic 0.5 mg weekly, Levemir 34 units QHS  Vial or pen: pen  Glucometer type: unsure    Previous DM treatments:  Januvia    Last Eye Exam: 1/2022. + DR. Meyers provider at [facility]. Will be changing to [facility] provider in the future.   Last Podiatry Exam: n/a    REVIEW OF SYSTEMS  Constitutional: no c/o weakness. + fatigue. + 4# weight loss over the past 1.5 years.  Eyes: denies visual disturbances.  Cardiac: no palpitations or chest pain.  Respiratory: no dyspnea, cough  GI: no c/o abdominal pain or nausea. Denies h/o pancreatitis.  Skin: no lesions or rashes.   Neuro: no numbness, tingling, or parasthesias.  Endocrine: denies polyphagia, polyuria. + polydipsia that has recently leveled off.       Personally reviewed Past Medical, Surgical, Social History.    Vital Signs  /60   Pulse 85   Ht 5' 1" (1.549 m)   Wt 77.1 kg (169 lb 15.6 oz)   BMI 32.12 kg/m²      Personally " reviewed the below labs:    Hemoglobin A1C   Date Value Ref Range Status   04/25/2022 11.5 (H) 4.0 - 5.6 % Final     Comment:     ADA Screening Guidelines:  5.7-6.4%  Consistent with prediabetes  >or=6.5%  Consistent with diabetes    High levels of fetal hemoglobin interfere with the HbA1C  assay. Heterozygous hemoglobin variants (HbS, HgC, etc)do  not significantly interfere with this assay.   However, presence of multiple variants may affect accuracy.     09/14/2020 6.8 (H) <5.7 % of total Hgb Final     Comment:     For someone without known diabetes, a hemoglobin A1c  value of 6.5% or greater indicates that they may have   diabetes and this should be confirmed with a follow-up   test.     For someone with known diabetes, a value <7% indicates   that their diabetes is well controlled and a value   greater than or equal to 7% indicates suboptimal   control. A1c targets should be individualized based on   duration of diabetes, age, comorbid conditions, and   other considerations.     Currently, no consensus exists regarding use of  hemoglobin A1c for diagnosis of diabetes for children.         01/16/2020 6.4 (H) <5.7 % of total Hgb Final     Comment:     For someone without known diabetes, a hemoglobin   A1c value between 5.7% and 6.4% is consistent with  prediabetes and should be confirmed with a   follow-up test.     For someone with known diabetes, a value <7%  indicates that their diabetes is well controlled. A1c  targets should be individualized based on duration of  diabetes, age, comorbid conditions, and other  considerations.     This assay result is consistent with an increased risk  of diabetes.     Currently, no consensus exists regarding use of  hemoglobin A1c for diagnosis of diabetes for children.            Chemistry        Component Value Date/Time     09/14/2020 1038    K 3.8 09/14/2020 1038    CL 99 09/14/2020 1038    CO2 27 09/14/2020 1038    BUN 22 09/14/2020 1038    CREATININE 1.65 (H)  09/14/2020 1038     (H) 09/14/2020 1038        Component Value Date/Time    CALCIUM 8.6 09/14/2020 1038    ALKPHOS 78 01/16/2020 1106    AST 13 09/14/2020 1038    ALT 10 09/14/2020 1038    BILITOT 0.5 09/14/2020 1038    ESTGFRAFRICA 37 (L) 09/14/2020 1038    EGFRNONAA 32 (L) 09/14/2020 1038          Lab Results   Component Value Date    CHOL 204 (H) 04/25/2022    CHOL 117 09/14/2020    CHOL 121 01/16/2020     Lab Results   Component Value Date    HDL 42 04/25/2022    HDL 36 (L) 09/14/2020    HDL 40 (L) 01/16/2020     Lab Results   Component Value Date    LDLCALC 112.6 04/25/2022    LDLCALC 53 09/14/2020    LDLCALC 54 01/16/2020     Lab Results   Component Value Date    TRIG 247 (H) 04/25/2022    TRIG 223 (H) 09/14/2020    TRIG 203 (H) 01/16/2020     Lab Results   Component Value Date    CHOLHDL 20.6 04/25/2022    CHOLHDL 3.3 09/14/2020    CHOLHDL 3.0 01/16/2020       Lab Results   Component Value Date    MICALBCREAT 31.7 (H) 04/25/2022     Lab Results   Component Value Date    TSH 2.40 04/03/2019       CrCl cannot be calculated (Patient's most recent lab result is older than the maximum 7 days allowed.).    No results found for: [id number]      PHYSICAL EXAMINATION  Constitutional: Appears well, no distress  Neck: Supple, trachea midline.  Respiratory: CTA, even and unlabored.  Cardiovascular: RRR, no murmurs.  GI: active bowel sounds, no hernia noted.  Skin: warm and dry; no visible wounds  Neuro: oriented to person, place, time  Feet: appropriate footwear.      A1c target < 7%      Assessment/Plan  1. Type 2 diabetes mellitus with retinopathy of both eyes, with long-term current use of insulin, macular edema presence unspecified, unspecified retinopathy severity  -- Worsening, complex. Unsure of renal function. She declined metabolic panel today. POCT glucose 311 mg/dL after eating banana. Will proceed with prandial insulin given A1c. Will also send in rx for Dexcom G6.  -- start Novolog 8 units with  meals. Start correction scale, target 150, ISF 50. Explained proper timing and use of Novolog and correction scale.   -- continue Levemir 34 units QHS.   -- continue metformin, Ozempic at current doses. When patient is willing, will check metabolic panel to assess renal function.   -- intensive BG monitoring needed. Dexcom G6 sent to [facility]. Her  receives his Dexcom supplies from there and they have the same insurance.      -- Discussed diagnosis of DM, A1c goals, progression of disease, long term complications and tx options.    -- Reviewed hypoglycemia management: treat with 1/2 glass of juice, 1/2 can regular coke, or 4 glucose tablets. Monitor and repeat treatment every 15 minutes until BG is >70 Then have a snack, which includes a complex carbohydrate and protein.   Advised patient to check BG before activities, such as driving or exercise.    -- takes statin, ACE-I, Xarelto     2. Essential hypertension  -- controlled  -- continue Hyzaar   3. Hyperlipidemia, unspecified hyperlipidemia type  -- uncontrolled with elevated triglycerides. LDL suboptimal  -- maintain DM control  -- taking atorvastatin   4. Hypoglycemia unawareness associated with Type 2 DM  -- recommended Dexcom CGM       FOLLOW UP  Follow up in about 6 weeks (around 6/13/2022).   Patient instructed to bring BG logs to each follow up   Patient encouraged to call for any BG/medication issues, concerns, or questions.      Orders Placed This Encounter   Procedures    Comprehensive Metabolic Panel    Hemoglobin A1C    COVID-19 Routine Screening    POCT Glucose, Hand-Held Device

## 2022-05-03 ENCOUNTER — TELEPHONE (OUTPATIENT)
Dept: ENDOCRINOLOGY | Facility: CLINIC | Age: 67
End: 2022-05-03
Payer: MEDICARE

## 2022-05-03 LAB
SARS-COV-2 RNA RESP QL NAA+PROBE: NOT DETECTED
SARS-COV-2- CYCLE NUMBER: NORMAL

## 2022-05-04 DIAGNOSIS — E11.319 TYPE 2 DIABETES MELLITUS WITH BOTH EYES AFFECTED BY RETINOPATHY WITHOUT MACULAR EDEMA, WITH LONG-TERM CURRENT USE OF INSULIN, UNSPECIFIED RETINOPATHY SEVERITY: ICD-10-CM

## 2022-05-04 DIAGNOSIS — Z79.4 TYPE 2 DIABETES MELLITUS WITH BOTH EYES AFFECTED BY RETINOPATHY WITHOUT MACULAR EDEMA, WITH LONG-TERM CURRENT USE OF INSULIN, UNSPECIFIED RETINOPATHY SEVERITY: ICD-10-CM

## 2022-05-04 RX ORDER — BLOOD-GLUCOSE SENSOR
EACH MISCELLANEOUS
Qty: 9 EACH | Refills: 3 | Status: SHIPPED | OUTPATIENT
Start: 2022-05-04

## 2022-05-04 NOTE — TELEPHONE ENCOUNTER
----- Message from Katherine Stiles sent at 5/4/2022  1:50 PM CDT -----  Contact: pt  Type: Needs Medical Advice    Who: Called: pt     Best Call Back Number: [phone]    Inquiry/Question: pt  needs a call back regarding her Dexcom Sensor they only gave a 30 day supply it was suppose to be for 90 days please advise  Thank you~

## 2022-05-09 ENCOUNTER — PATIENT MESSAGE (OUTPATIENT)
Dept: SMOKING CESSATION | Facility: CLINIC | Age: 67
End: 2022-05-09
Payer: MEDICARE

## 2022-05-12 ENCOUNTER — PES CALL (OUTPATIENT)
Dept: ADMINISTRATIVE | Facility: CLINIC | Age: 67
End: 2022-05-12
Payer: MEDICARE

## 2022-05-13 ENCOUNTER — TELEPHONE (OUTPATIENT)
Dept: ENDOCRINOLOGY | Facility: CLINIC | Age: 67
End: 2022-05-13
Payer: MEDICARE

## 2022-05-13 NOTE — TELEPHONE ENCOUNTER
Spoke with pt and advised chart notes/instructions from recent visit:  -- start Novolog 8 units with meals. Start correction scale, target 150, ISF 50. Explained proper timing and use of Novolog and correction scale.   Pt given correction scale and read back instructions    Pt has dexcom and states  had dexcom and is proficient and will train her  Pt verified all future appts    Will call back with any questions/concerns

## 2022-05-13 NOTE — TELEPHONE ENCOUNTER
----- Message from Marianela Rolle sent at 5/13/2022  9:34 AM CDT -----  Regarding: advice  Contact: self  Type: Needs Medical Advice  Who Called:  self  Symptoms (please be specific):    How long has patient had these symptoms:    Pharmacy name and phone #:    Best Call Back Number: [phone]  Additional Information: Patient requesting the dosage for prescribed rx renato log.

## 2022-05-20 ENCOUNTER — TELEPHONE (OUTPATIENT)
Dept: ENDOCRINOLOGY | Facility: CLINIC | Age: 67
End: 2022-05-20
Payer: MEDICARE

## 2022-05-20 NOTE — TELEPHONE ENCOUNTER
----- Message from Elzbieta Samano sent at 5/20/2022  2:55 PM CDT -----  Type:  Needs Medical Advice    Who Called:  Patient        Would the patient rather a call back or a response via [facility]?  Call    Best Call Back Number:  [phone]    Additional Information:   is asking to speak to Dewayne about patients medications     Please call to advise

## 2022-05-20 NOTE — TELEPHONE ENCOUNTER
Verbal order give to [facility] in [place] for Dexcom g6   They do have them in stock  Pt and  aware. [facility] will [facility] shortly to [facility]

## 2022-05-31 ENCOUNTER — PATIENT MESSAGE (OUTPATIENT)
Dept: ADMINISTRATIVE | Facility: HOSPITAL | Age: 67
End: 2022-05-31
Payer: MEDICARE

## 2022-06-03 ENCOUNTER — PES CALL (OUTPATIENT)
Dept: ADMINISTRATIVE | Facility: CLINIC | Age: 67
End: 2022-06-03
Payer: MEDICARE

## 2022-06-16 ENCOUNTER — PATIENT MESSAGE (OUTPATIENT)
Dept: FAMILY MEDICINE | Facility: CLINIC | Age: 67
End: 2022-06-16
Payer: MEDICARE

## 2022-06-21 ENCOUNTER — TELEPHONE (OUTPATIENT)
Dept: ADMINISTRATIVE | Facility: CLINIC | Age: 67
End: 2022-06-21
Payer: MEDICARE

## 2022-06-24 ENCOUNTER — PES CALL (OUTPATIENT)
Dept: ADMINISTRATIVE | Facility: CLINIC | Age: 67
End: 2022-06-24
Payer: MEDICARE

## 2022-06-27 ENCOUNTER — PES CALL (OUTPATIENT)
Dept: ADMINISTRATIVE | Facility: CLINIC | Age: 67
End: 2022-06-27
Payer: MEDICARE

## 2022-07-01 ENCOUNTER — PES CALL (OUTPATIENT)
Dept: ADMINISTRATIVE | Facility: CLINIC | Age: 67
End: 2022-07-01
Payer: MEDICARE

## 2022-07-19 ENCOUNTER — PES CALL (OUTPATIENT)
Dept: ADMINISTRATIVE | Facility: CLINIC | Age: 67
End: 2022-07-19
Payer: MEDICARE

## 2022-07-22 ENCOUNTER — TELEPHONE (OUTPATIENT)
Dept: ADMINISTRATIVE | Facility: CLINIC | Age: 67
End: 2022-07-22
Payer: MEDICARE

## 2022-08-01 ENCOUNTER — TELEPHONE (OUTPATIENT)
Dept: ADMINISTRATIVE | Facility: CLINIC | Age: 67
End: 2022-08-01
Payer: MEDICARE

## 2022-08-03 ENCOUNTER — TELEPHONE (OUTPATIENT)
Dept: ENDOCRINOLOGY | Facility: CLINIC | Age: 67
End: 2022-08-03
Payer: MEDICARE

## 2022-08-03 DIAGNOSIS — Z79.4 TYPE 2 DIABETES MELLITUS WITH BOTH EYES AFFECTED BY RETINOPATHY WITHOUT MACULAR EDEMA, WITH LONG-TERM CURRENT USE OF INSULIN, UNSPECIFIED RETINOPATHY SEVERITY: Primary | ICD-10-CM

## 2022-08-03 DIAGNOSIS — E11.319 TYPE 2 DIABETES MELLITUS WITH BOTH EYES AFFECTED BY RETINOPATHY WITHOUT MACULAR EDEMA, WITH LONG-TERM CURRENT USE OF INSULIN, UNSPECIFIED RETINOPATHY SEVERITY: Primary | ICD-10-CM

## 2022-08-03 RX ORDER — INSULIN GLARGINE 100 [IU]/ML
34 INJECTION, SOLUTION SUBCUTANEOUS NIGHTLY
Qty: 30 ML | Refills: 3 | Status: SHIPPED | OUTPATIENT
Start: 2022-08-03 | End: 2022-10-04 | Stop reason: SDUPTHER

## 2022-08-03 NOTE — TELEPHONE ENCOUNTER
Please let patient know that I sent in Lantus to replace the Levemir. I believe her  (with same insurance) was approved for Lantus

## 2022-08-03 NOTE — TELEPHONE ENCOUNTER
S/w Yakelin Hussein form [facility]. States pt's Levemir is notfully covered by plan. States if it can be switch to Lantus or toujeo. States she will be e-mailing us the list of different insulins that the plan will cover

## 2022-08-05 ENCOUNTER — LAB VISIT (OUTPATIENT)
Dept: LAB | Facility: HOSPITAL | Age: 67
End: 2022-08-05
Attending: NURSE PRACTITIONER
Payer: MEDICARE

## 2022-08-05 ENCOUNTER — PES CALL (OUTPATIENT)
Dept: ADMINISTRATIVE | Facility: CLINIC | Age: 67
End: 2022-08-05
Payer: MEDICARE

## 2022-08-05 DIAGNOSIS — I10 ESSENTIAL HYPERTENSION: ICD-10-CM

## 2022-08-05 DIAGNOSIS — E78.2 MIXED HYPERLIPIDEMIA: ICD-10-CM

## 2022-08-05 DIAGNOSIS — E11.319 TYPE 2 DIABETES MELLITUS WITH BOTH EYES AFFECTED BY RETINOPATHY WITHOUT MACULAR EDEMA, WITH LONG-TERM CURRENT USE OF INSULIN, UNSPECIFIED RETINOPATHY SEVERITY: ICD-10-CM

## 2022-08-05 DIAGNOSIS — Z79.4 TYPE 2 DIABETES MELLITUS WITH BOTH EYES AFFECTED BY RETINOPATHY WITHOUT MACULAR EDEMA, WITH LONG-TERM CURRENT USE OF INSULIN, UNSPECIFIED RETINOPATHY SEVERITY: ICD-10-CM

## 2022-08-05 LAB
ALBUMIN SERPL BCP-MCNC: 3.6 G/DL (ref 3.5–5.2)
ALP SERPL-CCNC: 75 U/L (ref 55–135)
ALT SERPL W/O P-5'-P-CCNC: 13 U/L (ref 10–44)
ANION GAP SERPL CALC-SCNC: 12 MMOL/L (ref 8–16)
AST SERPL-CCNC: 13 U/L (ref 10–40)
BILIRUB SERPL-MCNC: 0.5 MG/DL (ref 0.1–1)
BUN SERPL-MCNC: 8 MG/DL (ref 8–23)
CALCIUM SERPL-MCNC: 8.8 MG/DL (ref 8.7–10.5)
CHLORIDE SERPL-SCNC: 100 MMOL/L (ref 95–110)
CO2 SERPL-SCNC: 28 MMOL/L (ref 23–29)
CREAT SERPL-MCNC: 1.1 MG/DL (ref 0.5–1.4)
EST. GFR  (NO RACE VARIABLE): 54.4 ML/MIN/1.73 M^2
ESTIMATED AVG GLUCOSE: 309 MG/DL (ref 68–131)
GLUCOSE SERPL-MCNC: 165 MG/DL (ref 70–110)
HBA1C MFR BLD: 12.4 % (ref 4–5.6)
POTASSIUM SERPL-SCNC: 3.3 MMOL/L (ref 3.5–5.1)
PROT SERPL-MCNC: 7.1 G/DL (ref 6–8.4)
SODIUM SERPL-SCNC: 140 MMOL/L (ref 136–145)

## 2022-08-05 PROCEDURE — 83036 HEMOGLOBIN GLYCOSYLATED A1C: CPT | Performed by: NURSE PRACTITIONER

## 2022-08-05 PROCEDURE — 80053 COMPREHEN METABOLIC PANEL: CPT | Performed by: NURSE PRACTITIONER

## 2022-08-05 PROCEDURE — 36415 COLL VENOUS BLD VENIPUNCTURE: CPT | Mod: PO | Performed by: NURSE PRACTITIONER

## 2022-08-05 RX ORDER — ATORVASTATIN CALCIUM 40 MG/1
TABLET, FILM COATED ORAL
Qty: 30 TABLET | Refills: 0 | Status: SHIPPED | OUTPATIENT
Start: 2022-08-05 | End: 2023-03-09

## 2022-08-05 RX ORDER — LOSARTAN POTASSIUM AND HYDROCHLOROTHIAZIDE 12.5; 5 MG/1; MG/1
TABLET ORAL
Qty: 30 TABLET | Refills: 0 | Status: SHIPPED | OUTPATIENT
Start: 2022-08-05 | End: 2022-11-16 | Stop reason: SDUPTHER

## 2022-08-05 NOTE — TELEPHONE ENCOUNTER
Refill Routing Note   Medication(s) are not appropriate for processing by [facility] for the following reason(s):      - Required laboratory values are outdated    [facility] action(s):  Defer Medication-related problems identified: Requires labs        Medication reconciliation completed: No     Appointments  past 12m or future 3m with PCP    Date Provider   Last Visit   6/1/2021 GLENROY Kirby MD   Next Visit   Visit date not found GLENROY Kirby MD   ED visits in past 90 days: 0        Note composed:12:56 PM 08/05/2022

## 2022-08-05 NOTE — TELEPHONE ENCOUNTER
Care Due:                  Date            Visit Type   Department     Provider  --------------------------------------------------------------------------------                                EP -                              PRIMARY      [facility] FAMILY  Last Visit: 06-      CARE ([facility])   MEDICINE       GLENROY ANGLIN  Next Visit: None Scheduled  None         None Found                                                            Last  Test          Frequency    Reason                     Performed    Due Date  --------------------------------------------------------------------------------    HBA1C.......  6 months...  OZEMPIC, metFORMIN.......  04-   10-    [facility] Embedded Care Gaps. Reference number: [id number]. 8/05/2022   11:31:24 AM CDT

## 2022-08-12 ENCOUNTER — OFFICE VISIT (OUTPATIENT)
Dept: ENDOCRINOLOGY | Facility: CLINIC | Age: 67
End: 2022-08-12
Payer: MEDICARE

## 2022-08-12 VITALS
BODY MASS INDEX: 33.47 KG/M2 | HEIGHT: 61 IN | HEART RATE: 100 BPM | WEIGHT: 177.25 LBS | SYSTOLIC BLOOD PRESSURE: 110 MMHG | DIASTOLIC BLOOD PRESSURE: 70 MMHG

## 2022-08-12 DIAGNOSIS — I10 ESSENTIAL HYPERTENSION: ICD-10-CM

## 2022-08-12 DIAGNOSIS — E11.319 TYPE 2 DIABETES MELLITUS WITH BOTH EYES AFFECTED BY RETINOPATHY WITHOUT MACULAR EDEMA, WITH LONG-TERM CURRENT USE OF INSULIN, UNSPECIFIED RETINOPATHY SEVERITY: Primary | ICD-10-CM

## 2022-08-12 DIAGNOSIS — E78.2 MIXED HYPERLIPIDEMIA: ICD-10-CM

## 2022-08-12 DIAGNOSIS — E11.649 HYPOGLYCEMIA UNAWARENESS ASSOCIATED WITH TYPE 2 DIABETES MELLITUS: ICD-10-CM

## 2022-08-12 DIAGNOSIS — Z79.4 TYPE 2 DIABETES MELLITUS WITH BOTH EYES AFFECTED BY RETINOPATHY WITHOUT MACULAR EDEMA, WITH LONG-TERM CURRENT USE OF INSULIN, UNSPECIFIED RETINOPATHY SEVERITY: Primary | ICD-10-CM

## 2022-08-12 PROCEDURE — 99999 PR PBB SHADOW E&M-EST. PATIENT-LVL IV: ICD-10-PCS | Mod: PBBFAC,,, | Performed by: NURSE PRACTITIONER

## 2022-08-12 PROCEDURE — 99214 OFFICE O/P EST MOD 30 MIN: CPT | Mod: S$GLB,,, | Performed by: NURSE PRACTITIONER

## 2022-08-12 PROCEDURE — 95251 PR GLUCOSE MONITOR, 72 HOUR, PHYS INTERP: ICD-10-PCS | Mod: S$GLB,,, | Performed by: NURSE PRACTITIONER

## 2022-08-12 PROCEDURE — 95251 CONT GLUC MNTR ANALYSIS I&R: CPT | Mod: S$GLB,,, | Performed by: NURSE PRACTITIONER

## 2022-08-12 PROCEDURE — 99214 PR OFFICE/OUTPT VISIT, EST, LEVL IV, 30-39 MIN: ICD-10-PCS | Mod: S$GLB,,, | Performed by: NURSE PRACTITIONER

## 2022-08-12 PROCEDURE — 99999 PR PBB SHADOW E&M-EST. PATIENT-LVL IV: CPT | Mod: PBBFAC,,, | Performed by: NURSE PRACTITIONER

## 2022-08-12 RX ORDER — METFORMIN HYDROCHLORIDE 500 MG/1
1000 TABLET, EXTENDED RELEASE ORAL 2 TIMES DAILY WITH MEALS
Qty: 360 TABLET | Refills: 3 | Status: SHIPPED | OUTPATIENT
Start: 2022-08-12 | End: 2022-08-19 | Stop reason: SDUPTHER

## 2022-08-12 RX ORDER — BLOOD-GLUCOSE,RECEIVER,CONT
EACH MISCELLANEOUS
COMMUNITY
Start: 2022-05-23

## 2022-08-12 RX ORDER — INSULIN ASPART 100 [IU]/ML
10 INJECTION, SOLUTION INTRAVENOUS; SUBCUTANEOUS
Qty: 45 ML | Refills: 2
Start: 2022-08-12 | End: 2022-08-19 | Stop reason: SDUPTHER

## 2022-08-12 NOTE — PROGRESS NOTES
"CC: Ms. Jeanine Chandler arrives today for management of Type 2 DM and review of chronic medical conditions, as listed in the Visit Diagnosis section of this encounter.       HPI: Ms. Jeanine Chandler was diagnosed with Type 2 DM in her late 40s. She was diagnosed based on lab work. Initial treatment consisted of metformin and insulin was added a few years later. + FH of DM in mother and sister. Denies hospitalizations due to DM.     Patient was last seen by me in May. At this time, Novolog was added with meals due to A1c of 11.5%. Dexcom G6 was also ordered. A1c now 12.4%.     BG monitoring per Dexcom G6.     Hypoglycemia: Recalls only one episode. This occurred after eating dinner of air-fried shrimp and potatoes.  Symptoms: sweating  Treatment: juice     Missing Insulin/PO medication doses: No    Exercise: No    Dietary Habits: Eats 2-3 meals/day. Snacks in between on carb related foods. Avoids sugary beverages.     Last DM education appointment: 4/9/2019      CURRENT DIABETIC MEDS: metformin 1000 mg BID, Ozempic 0.5 mg weekly, Levemir 34 units QHS, Novolog 8 units with meals + correction scale, target 150, ISF 50  Vial or pen: pen  Glucometer type: unsure    Previous DM treatments:  Januvia    Last Eye Exam: 1/2022. + DR. Meyers provider at [facility]. Will be changing to [facility] provider in the future.   Last Podiatry Exam: n/a    REVIEW OF SYSTEMS  Constitutional: no c/o weakness, weight loss. + fatigue. + weight gain  Cardiac: no palpitations or chest pain.  Respiratory: no dyspnea, cough  GI: no c/o abdominal pain or nausea. Denies h/o pancreatitis. Reports diarrhea "for several years." Did not worsen after starting Ozempic. States her mother had similar issue.   Skin: no lesions or rashes.   Neuro: no numbness, tingling, or parasthesias.  Endocrine: denies polyphagia, polyuria, polydipsia       Personally reviewed Past Medical, Surgical, Social History.    Vital Signs  /70   Pulse 100   Ht 5' 1" (1.549 " m)   Wt 80.4 kg (177 lb 4 oz)   BMI 33.49 kg/m²      Personally reviewed the below labs:    Hemoglobin A1C   Date Value Ref Range Status   08/05/2022 12.4 (H) 4.0 - 5.6 % Final     Comment:     ADA Screening Guidelines:  5.7-6.4%  Consistent with prediabetes  >or=6.5%  Consistent with diabetes    High levels of fetal hemoglobin interfere with the HbA1C  assay. Heterozygous hemoglobin variants (HbS, HgC, etc)do  not significantly interfere with this assay.   However, presence of multiple variants may affect accuracy.     04/25/2022 11.5 (H) 4.0 - 5.6 % Final     Comment:     ADA Screening Guidelines:  5.7-6.4%  Consistent with prediabetes  >or=6.5%  Consistent with diabetes    High levels of fetal hemoglobin interfere with the HbA1C  assay. Heterozygous hemoglobin variants (HbS, HgC, etc)do  not significantly interfere with this assay.   However, presence of multiple variants may affect accuracy.     09/14/2020 6.8 (H) <5.7 % of total Hgb Final     Comment:     For someone without known diabetes, a hemoglobin A1c  value of 6.5% or greater indicates that they may have   diabetes and this should be confirmed with a follow-up   test.     For someone with known diabetes, a value <7% indicates   that their diabetes is well controlled and a value   greater than or equal to 7% indicates suboptimal   control. A1c targets should be individualized based on   duration of diabetes, age, comorbid conditions, and   other considerations.     Currently, no consensus exists regarding use of  hemoglobin A1c for diagnosis of diabetes for children.             Chemistry        Component Value Date/Time     08/05/2022 1338    K 3.3 (L) 08/05/2022 1338     08/05/2022 1338    CO2 28 08/05/2022 1338    BUN 8 08/05/2022 1338    CREATININE 1.1 08/05/2022 1338     (H) 08/05/2022 1338        Component Value Date/Time    CALCIUM 8.8 08/05/2022 1338    ALKPHOS 75 08/05/2022 1338    AST 13 08/05/2022 1338    ALT 13 08/05/2022  1338    BILITOT 0.5 08/05/2022 1338    ESTGFRAFRICA 37 (L) 09/14/2020 1038    EGFRNONAA 32 (L) 09/14/2020 1038          Lab Results   Component Value Date    CHOL 204 (H) 04/25/2022    CHOL 117 09/14/2020    CHOL 121 01/16/2020     Lab Results   Component Value Date    HDL 42 04/25/2022    HDL 36 (L) 09/14/2020    HDL 40 (L) 01/16/2020     Lab Results   Component Value Date    LDLCALC 112.6 04/25/2022    LDLCALC 53 09/14/2020    LDLCALC 54 01/16/2020     Lab Results   Component Value Date    TRIG 247 (H) 04/25/2022    TRIG 223 (H) 09/14/2020    TRIG 203 (H) 01/16/2020     Lab Results   Component Value Date    CHOLHDL 20.6 04/25/2022    CHOLHDL 3.3 09/14/2020    CHOLHDL 3.0 01/16/2020       Lab Results   Component Value Date    MICALBCREAT 31.7 (H) 04/25/2022     Lab Results   Component Value Date    TSH 2.40 04/03/2019       CrCl cannot be calculated (Unknown ideal weight.).    No results found for: [id number]      PHYSICAL EXAMINATION  Constitutional: Appears well, no distress  Neck: Supple, trachea midline.  Respiratory: CTA, even and unlabored.  Cardiovascular: RRR, no murmurs.  GI: active bowel sounds, no hernia noted.  Skin: warm and dry; no visible wounds  Neuro: oriented to person, place, time  Feet: appropriate footwear.  Protective Sensation (w/ 10 gram monofilament):  Right: Intact  Left: Intact    Visual Inspection:  Normal -  Bilateral    Pedal Pulses:   Right: Present  Left: Present    Posterior tibialis:   Right:Present  Left: Present      DEXCOM DOWNLOAD: See media file for download. Fasting glucoses vary. Large prandial excursions noted. One isolated episode of hypoglycemia around 3PM.  Average glucose: 261 mg/dL  Above 250 mg/dL: 55 %  181-250 mg/dL: 24 %   mg/dL: 20 %  54-69 mg/dL: <1 %  Below 54 mg/dL: 0 %        A1c target < 7%      Assessment/Plan  1. Type 2 diabetes mellitus with retinopathy of both eyes, with long-term current use of insulin, macular edema presence unspecified,  unspecified retinopathy severity  -- Worsening. Patient denies omitting insulin doses. While CGM does reveal elevated average glucose, it is not as elevated as A1c suggests.   -- Change metformin to metformin XR 1000 mg BID to see if this helps with diarrhea.   -- Increase Ozempic to 1 mg once weekly  -- Increase Novolog to 10 units with meals + sliding scale  -- Continue Lantus 34 units nightly  -- BG monitoring per Dexcom G6     -- Discussed diagnosis of DM, A1c goals, progression of disease, long term complications and tx options.    -- Reviewed hypoglycemia management: treat with 1/2 glass of juice, 1/2 can regular coke, or 4 glucose tablets. Monitor and repeat treatment every 15 minutes until BG is >70 Then have a snack, which includes a complex carbohydrate and protein.   Advised patient to check BG before activities, such as driving or exercise.    -- takes statin, ACE-I, Xarelto     2. Essential hypertension  -- controlled  -- continue Hyzaar   3. Hyperlipidemia, unspecified hyperlipidemia type  -- uncontrolled with elevated triglycerides. LDL suboptimal  -- taking atorvastatin  -- optimize DM control   4. Hypoglycemia unawareness associated with Type 2 DM  -- continue Dexcom CGM       FOLLOW UP  Follow up in about 3 months (around 11/12/2022).   Patient instructed to bring BG logs to each follow up   Patient encouraged to call for any BG/medication issues, concerns, or questions.      Orders Placed This Encounter   Procedures    Hemoglobin A1C    Basic Metabolic Panel

## 2022-08-12 NOTE — PATIENT INSTRUCTIONS
Change metformin to metformin XR. Take  mg tablets twice daily.     Increase Ozempic to 1 mg once weekly    Increase Novolog to 10 units with meals + sliding scale    Continue Lantus 34 units nightly

## 2022-08-18 ENCOUNTER — PES CALL (OUTPATIENT)
Dept: ADMINISTRATIVE | Facility: CLINIC | Age: 67
End: 2022-08-18
Payer: MEDICARE

## 2022-08-18 DIAGNOSIS — E78.2 MIXED HYPERLIPIDEMIA: ICD-10-CM

## 2022-08-18 RX ORDER — ATORVASTATIN CALCIUM 40 MG/1
40 TABLET, FILM COATED ORAL DAILY
Qty: 90 TABLET | Refills: 0 | Status: CANCELLED | OUTPATIENT
Start: 2022-08-18

## 2022-08-19 ENCOUNTER — TELEPHONE (OUTPATIENT)
Dept: ENDOCRINOLOGY | Facility: CLINIC | Age: 67
End: 2022-08-19
Payer: MEDICARE

## 2022-08-19 DIAGNOSIS — Z79.4 TYPE 2 DIABETES MELLITUS WITH BOTH EYES AFFECTED BY RETINOPATHY WITHOUT MACULAR EDEMA, WITH LONG-TERM CURRENT USE OF INSULIN, UNSPECIFIED RETINOPATHY SEVERITY: ICD-10-CM

## 2022-08-19 DIAGNOSIS — E11.319 TYPE 2 DIABETES MELLITUS WITH BOTH EYES AFFECTED BY RETINOPATHY WITHOUT MACULAR EDEMA, WITH LONG-TERM CURRENT USE OF INSULIN, UNSPECIFIED RETINOPATHY SEVERITY: ICD-10-CM

## 2022-08-19 RX ORDER — METFORMIN HYDROCHLORIDE 500 MG/1
1000 TABLET, EXTENDED RELEASE ORAL 2 TIMES DAILY WITH MEALS
Qty: 360 TABLET | Refills: 3 | Status: SHIPPED | OUTPATIENT
Start: 2022-08-19 | End: 2022-10-04 | Stop reason: SDUPTHER

## 2022-08-19 RX ORDER — INSULIN ASPART 100 [IU]/ML
10 INJECTION, SOLUTION INTRAVENOUS; SUBCUTANEOUS
Qty: 45 ML | Refills: 2 | Status: SHIPPED | OUTPATIENT
Start: 2022-08-19 | End: 2022-10-04 | Stop reason: SDUPTHER

## 2022-08-19 NOTE — TELEPHONE ENCOUNTER
Pt asking to re send her Rx you sent on Friday to [facility] but w/90 day supply. States insurance will not cover them if they are 30 days. (novolog,ozempic and metformin)

## 2022-08-19 NOTE — TELEPHONE ENCOUNTER
----- Message from Felipe Sanchez sent at 8/19/2022 10:04 AM CDT -----  Type: Needs Medical Advice  Who Called:  Pt    Pharmacy name and phone #:    [id number] [place] [facility] [address] [facility]  [address]  [place] [address]  Phone: [phone] Fax: [phone]      Best Call Back Number: [phone]     Additional Information: Pt sts that she needs all the RX's that was done last Friday at the visit to be rewritten for 90 days.   Please also put it on the chart for all her RX to be fill for 90 days.  Ins won't pay for 30 days.  Please advise -- Thank you

## 2022-08-25 ENCOUNTER — PES CALL (OUTPATIENT)
Dept: ADMINISTRATIVE | Facility: CLINIC | Age: 67
End: 2022-08-25
Payer: MEDICARE

## 2022-10-04 ENCOUNTER — OFFICE VISIT (OUTPATIENT)
Dept: FAMILY MEDICINE | Facility: CLINIC | Age: 67
End: 2022-10-04
Payer: MEDICARE

## 2022-10-04 VITALS
SYSTOLIC BLOOD PRESSURE: 114 MMHG | HEIGHT: 61 IN | HEART RATE: 64 BPM | BODY MASS INDEX: 31.75 KG/M2 | OXYGEN SATURATION: 96 % | WEIGHT: 168.19 LBS | DIASTOLIC BLOOD PRESSURE: 72 MMHG

## 2022-10-04 DIAGNOSIS — I48.0 PAROXYSMAL ATRIAL FIBRILLATION: ICD-10-CM

## 2022-10-04 DIAGNOSIS — E11.65 UNCONTROLLED TYPE 2 DIABETES MELLITUS WITH HYPERGLYCEMIA: Primary | ICD-10-CM

## 2022-10-04 DIAGNOSIS — R35.89 POLYURIA: ICD-10-CM

## 2022-10-04 LAB
BACTERIA #/AREA URNS HPF: ABNORMAL /HPF
BILIRUB UR QL STRIP: NEGATIVE
CLARITY UR: ABNORMAL
COLOR UR: YELLOW
GLUCOSE SERPL-MCNC: 465 MG/DL (ref 70–110)
GLUCOSE UR QL STRIP: ABNORMAL
HGB UR QL STRIP: ABNORMAL
KETONES UR QL STRIP: ABNORMAL
LEUKOCYTE ESTERASE UR QL STRIP: ABNORMAL
MICROSCOPIC COMMENT: ABNORMAL
NITRITE UR QL STRIP: NEGATIVE
PH UR STRIP: 6 [PH] (ref 5–8)
PROT UR QL STRIP: ABNORMAL
RBC #/AREA URNS HPF: 0 /HPF (ref 0–4)
SP GR UR STRIP: 1.01 (ref 1–1.03)
SQUAMOUS #/AREA URNS HPF: 2 /HPF
URN SPEC COLLECT METH UR: ABNORMAL
WBC #/AREA URNS HPF: 50 /HPF (ref 0–5)

## 2022-10-04 PROCEDURE — 3288F FALL RISK ASSESSMENT DOCD: CPT | Mod: CPTII,S$GLB,, | Performed by: PHYSICIAN ASSISTANT

## 2022-10-04 PROCEDURE — 82962 GLUCOSE BLOOD TEST: CPT | Mod: S$GLB,,, | Performed by: PHYSICIAN ASSISTANT

## 2022-10-04 PROCEDURE — 3078F DIAST BP <80 MM HG: CPT | Mod: CPTII,S$GLB,, | Performed by: PHYSICIAN ASSISTANT

## 2022-10-04 PROCEDURE — 3060F POS MICROALBUMINURIA REV: CPT | Mod: CPTII,S$GLB,, | Performed by: PHYSICIAN ASSISTANT

## 2022-10-04 PROCEDURE — 99999 PR PBB SHADOW E&M-EST. PATIENT-LVL V: ICD-10-PCS | Mod: PBBFAC,,, | Performed by: PHYSICIAN ASSISTANT

## 2022-10-04 PROCEDURE — 1159F MED LIST DOCD IN RCRD: CPT | Mod: CPTII,S$GLB,, | Performed by: PHYSICIAN ASSISTANT

## 2022-10-04 PROCEDURE — 3066F PR DOCUMENTATION OF TREATMENT FOR NEPHROPATHY: ICD-10-PCS | Mod: CPTII,S$GLB,, | Performed by: PHYSICIAN ASSISTANT

## 2022-10-04 PROCEDURE — 1125F PR PAIN SEVERITY QUANTIFIED, PAIN PRESENT: ICD-10-PCS | Mod: CPTII,S$GLB,, | Performed by: PHYSICIAN ASSISTANT

## 2022-10-04 PROCEDURE — 3060F PR POS MICROALBUMINURIA RESULT DOCUMENTED/REVIEW: ICD-10-PCS | Mod: CPTII,S$GLB,, | Performed by: PHYSICIAN ASSISTANT

## 2022-10-04 PROCEDURE — 3046F PR MOST RECENT HEMOGLOBIN A1C LEVEL > 9.0%: ICD-10-PCS | Mod: CPTII,S$GLB,, | Performed by: PHYSICIAN ASSISTANT

## 2022-10-04 PROCEDURE — 3066F NEPHROPATHY DOC TX: CPT | Mod: CPTII,S$GLB,, | Performed by: PHYSICIAN ASSISTANT

## 2022-10-04 PROCEDURE — 87088 URINE BACTERIA CULTURE: CPT | Performed by: PHYSICIAN ASSISTANT

## 2022-10-04 PROCEDURE — 3078F PR MOST RECENT DIASTOLIC BLOOD PRESSURE < 80 MM HG: ICD-10-PCS | Mod: CPTII,S$GLB,, | Performed by: PHYSICIAN ASSISTANT

## 2022-10-04 PROCEDURE — 81000 URINALYSIS NONAUTO W/SCOPE: CPT | Mod: PO | Performed by: PHYSICIAN ASSISTANT

## 2022-10-04 PROCEDURE — 99999 PR PBB SHADOW E&M-EST. PATIENT-LVL V: CPT | Mod: PBBFAC,,, | Performed by: PHYSICIAN ASSISTANT

## 2022-10-04 PROCEDURE — 3074F PR MOST RECENT SYSTOLIC BLOOD PRESSURE < 130 MM HG: ICD-10-PCS | Mod: CPTII,S$GLB,, | Performed by: PHYSICIAN ASSISTANT

## 2022-10-04 PROCEDURE — 3008F PR BODY MASS INDEX (BMI) DOCUMENTED: ICD-10-PCS | Mod: CPTII,S$GLB,, | Performed by: PHYSICIAN ASSISTANT

## 2022-10-04 PROCEDURE — 1100F PR PT FALLS ASSESS DOC 2+ FALLS/FALL W/INJURY/YR: ICD-10-PCS | Mod: CPTII,S$GLB,, | Performed by: PHYSICIAN ASSISTANT

## 2022-10-04 PROCEDURE — 1159F PR MEDICATION LIST DOCUMENTED IN MEDICAL RECORD: ICD-10-PCS | Mod: CPTII,S$GLB,, | Performed by: PHYSICIAN ASSISTANT

## 2022-10-04 PROCEDURE — 87077 CULTURE AEROBIC IDENTIFY: CPT | Performed by: PHYSICIAN ASSISTANT

## 2022-10-04 PROCEDURE — 3074F SYST BP LT 130 MM HG: CPT | Mod: CPTII,S$GLB,, | Performed by: PHYSICIAN ASSISTANT

## 2022-10-04 PROCEDURE — 3008F BODY MASS INDEX DOCD: CPT | Mod: CPTII,S$GLB,, | Performed by: PHYSICIAN ASSISTANT

## 2022-10-04 PROCEDURE — 1100F PTFALLS ASSESS-DOCD GE2>/YR: CPT | Mod: CPTII,S$GLB,, | Performed by: PHYSICIAN ASSISTANT

## 2022-10-04 PROCEDURE — 82962 POCT GLUCOSE, HAND-HELD DEVICE: ICD-10-PCS | Mod: S$GLB,,, | Performed by: PHYSICIAN ASSISTANT

## 2022-10-04 PROCEDURE — 99214 OFFICE O/P EST MOD 30 MIN: CPT | Mod: S$GLB,,, | Performed by: PHYSICIAN ASSISTANT

## 2022-10-04 PROCEDURE — 1125F AMNT PAIN NOTED PAIN PRSNT: CPT | Mod: CPTII,S$GLB,, | Performed by: PHYSICIAN ASSISTANT

## 2022-10-04 PROCEDURE — 87086 URINE CULTURE/COLONY COUNT: CPT | Performed by: PHYSICIAN ASSISTANT

## 2022-10-04 PROCEDURE — 99214 PR OFFICE/OUTPT VISIT, EST, LEVL IV, 30-39 MIN: ICD-10-PCS | Mod: S$GLB,,, | Performed by: PHYSICIAN ASSISTANT

## 2022-10-04 PROCEDURE — 87186 SC STD MICRODIL/AGAR DIL: CPT | Performed by: PHYSICIAN ASSISTANT

## 2022-10-04 PROCEDURE — 3288F PR FALLS RISK ASSESSMENT DOCUMENTED: ICD-10-PCS | Mod: CPTII,S$GLB,, | Performed by: PHYSICIAN ASSISTANT

## 2022-10-04 PROCEDURE — 3046F HEMOGLOBIN A1C LEVEL >9.0%: CPT | Mod: CPTII,S$GLB,, | Performed by: PHYSICIAN ASSISTANT

## 2022-10-04 NOTE — Clinical Note
This patient has stopped all her meds.  POCT glucose today was 465.  She has a possible UTI as well.  Sent her to the ER to bring blood sugar down slowly.  She will probably need to be seen soon. Thanks, Idania Junior PA-C

## 2022-10-04 NOTE — PROGRESS NOTES
Subjective:      Patient ID: Jeanine Chandler is a 67 y.o. female.    Chief Complaint: No chief complaint on file.    HPI  Patient has PMH of HTN, HLD, afib, breast cancer, Type 2 DM, and colon polyps.    Patient fell this morning and hit right eye.  Fell and hit her back with resulting scratches last Saturday.  Worsening balance and stability issues for the last year.    Urinary incontinence for a month.  History of UTIs, no kidney stones.    Not checking blood sugar.  Not taking insulin or metformin for a month.  Lab Results   Component Value Date    LABA1C 9.6 (H) 05/21/2018    HGBA1C 12.4 (H) 08/05/2022      Review of Systems   Constitutional:  Negative for appetite change, chills and fever.   Respiratory:  Negative for shortness of breath.    Cardiovascular:  Negative for chest pain.   Gastrointestinal:  Positive for diarrhea. Negative for abdominal pain, blood in stool, constipation, nausea and vomiting.   Genitourinary:  Positive for frequency. Negative for dysuria, flank pain and hematuria.        New urinary incontinence.   Neurological:  Positive for headaches. Negative for dizziness and syncope.   Psychiatric/Behavioral:  Positive for sleep disturbance (due to frequent urination). Negative for confusion and suicidal ideas. The patient is nervous/anxious.        Objective:   There were no vitals taken for this visit.    Physical Exam  Constitutional:       Appearance: Normal appearance. She is well-developed and well-groomed. She is obese.   HENT:      Head: Normocephalic and atraumatic.      Right Ear: Hearing and external ear normal.      Left Ear: Hearing and external ear normal.      Mouth/Throat:      Lips: Pink.   Eyes:      General: Lids are normal.      Extraocular Movements: Extraocular movements intact.      Conjunctiva/sclera: Conjunctivae normal.   Neck:      Trachea: Phonation normal.   Cardiovascular:      Rate and Rhythm: Normal rate. Rhythm irregularly irregular.      Heart sounds: Normal  heart sounds. No murmur heard.    No friction rub. No gallop.   Pulmonary:      Effort: Pulmonary effort is normal. No respiratory distress.      Breath sounds: Normal breath sounds. No decreased breath sounds, wheezing, rhonchi or rales.   Abdominal:      General: Bowel sounds are normal.      Tenderness: There is no abdominal tenderness. There is no right CVA tenderness or left CVA tenderness.   Musculoskeletal:         General: Normal range of motion.   Skin:     General: Skin is warm and dry.      Findings: No rash.   Neurological:      General: No focal deficit present.      Mental Status: She is alert and oriented to person, place, and time.      Comments: Shuffling gait.   Psychiatric:         Mood and Affect: Mood normal.         Behavior: Behavior normal. Behavior is cooperative.     Assessment:      1. Dysuria       Plan:   There are no diagnoses linked to this encounter.    Follow up   Patient agreed with plan and expressed understanding.    Thank you for allowing me to serve you,

## 2022-10-04 NOTE — PROGRESS NOTES
"Subjective:      Patient ID: Jeanine Chandler is a 67 y.o. female.    Chief Complaint: Urinary Tract Infection (Stands up and "it all runs out" x 1 month) and Urinary Frequency    HPI  Patient has PMH of HTN, HLD, afib, breast cancer, Type 2 DM, and colon polyps.     Patient fell this morning and hit right eye.  Fell and hit her back with resulting scratches last Saturday.  Worsening balance and stability issues for the last year.     Urinary incontinence for a month.  History of UTIs, no kidney stones.     Not checking blood sugar.  Not taking insulin or metformin for a month.        Lab Results   Component Value Date     LABA1C 9.6 (H) 05/21/2018     HGBA1C 12.4 (H) 08/05/2022      Review of Systems   Constitutional:  Negative for appetite change, chills and fever.   Respiratory:  Negative for shortness of breath.    Cardiovascular:  Negative for chest pain.   Gastrointestinal:  Positive for diarrhea. Negative for abdominal pain, blood in stool, constipation, nausea and vomiting.   Genitourinary:  Positive for frequency. Negative for dysuria, flank pain and hematuria.        New urinary incontinence.   Neurological:  Positive for headaches. Negative for dizziness and syncope.   Psychiatric/Behavioral:  Positive for sleep disturbance (due to frequent urination). Negative for confusion and suicidal ideas. The patient is nervous/anxious.         Objective:   /72   Pulse 64   Ht 5' 1" (1.549 m)   Wt 76.3 kg (168 lb 3.4 oz)   SpO2 96%   BMI 31.78 kg/m²     Physical Exam  Constitutional:       Appearance: Normal appearance. She is well-developed and well-groomed. She is obese.   HENT:      Head: Normocephalic and atraumatic.      Right Ear: Hearing and external ear normal.      Left Ear: Hearing and external ear normal.      Mouth/Throat:      Lips: Pink.   Eyes:      General: Lids are normal.      Extraocular Movements: Extraocular movements intact.      Conjunctiva/sclera: Conjunctivae normal.   Neck:      " Trachea: Phonation normal.   Cardiovascular:      Rate and Rhythm: Normal rate. Rhythm irregularly irregular.      Heart sounds: Normal heart sounds. No murmur heard.    No friction rub. No gallop.   Pulmonary:      Effort: Pulmonary effort is normal. No respiratory distress.      Breath sounds: Normal breath sounds. No decreased breath sounds, wheezing, rhonchi or rales.   Abdominal:      General: Bowel sounds are normal.      Tenderness: There is no abdominal tenderness. There is no right CVA tenderness or left CVA tenderness.   Musculoskeletal:         General: Normal range of motion.   Skin:     General: Skin is warm and dry.      Findings: No rash.   Neurological:      General: No focal deficit present.      Mental Status: She is alert and oriented to person, place, and time.      Comments: Shuffling gait.   Psychiatric:         Mood and Affect: Mood normal.         Behavior: Behavior normal. Behavior is cooperative.     Assessment:      1. Uncontrolled type 2 diabetes mellitus with hyperglycemia    2. Polyuria    3. Paroxysmal atrial fibrillation       Plan:   1. Uncontrolled type 2 diabetes mellitus with hyperglycemia  Advised to go to ER to decrease sugar safely.  Patient declined.  Will alert endocrinology on blood sugar.    2. Polyuria  Will defer to ER to treat.  - Urinalysis, Reflex to Urine Culture Urine, Clean Catch  - Urinalysis Microscopic  - POCT Glucose, Hand-Held Device    3. Paroxysmal atrial fibrillation  Appreciated today.    Follow up as needed.  Patient agreed with plan and expressed understanding.    Thank you for allowing me to serve you,

## 2022-10-06 LAB — BACTERIA UR CULT: ABNORMAL

## 2022-10-10 ENCOUNTER — OFFICE VISIT (OUTPATIENT)
Dept: FAMILY MEDICINE | Facility: CLINIC | Age: 67
End: 2022-10-10
Payer: MEDICARE

## 2022-10-10 VITALS
WEIGHT: 169.06 LBS | DIASTOLIC BLOOD PRESSURE: 82 MMHG | OXYGEN SATURATION: 98 % | SYSTOLIC BLOOD PRESSURE: 120 MMHG | BODY MASS INDEX: 31.11 KG/M2 | HEIGHT: 62 IN | HEART RATE: 76 BPM

## 2022-10-10 DIAGNOSIS — E11.319 TYPE 2 DIABETES MELLITUS WITH RETINOPATHY OF BOTH EYES, WITH LONG-TERM CURRENT USE OF INSULIN, MACULAR EDEMA PRESENCE UNSPECIFIED, UNSPECIFIED RETINOPATHY SEVERITY: Primary | ICD-10-CM

## 2022-10-10 DIAGNOSIS — I15.2 HYPERTENSION ASSOCIATED WITH DIABETES: ICD-10-CM

## 2022-10-10 DIAGNOSIS — K63.5 POLYP OF COLON, UNSPECIFIED PART OF COLON, UNSPECIFIED TYPE: ICD-10-CM

## 2022-10-10 DIAGNOSIS — L30.9 ECZEMA, UNSPECIFIED TYPE: ICD-10-CM

## 2022-10-10 DIAGNOSIS — Z79.4 TYPE 2 DIABETES MELLITUS WITH RETINOPATHY OF BOTH EYES, WITH LONG-TERM CURRENT USE OF INSULIN, MACULAR EDEMA PRESENCE UNSPECIFIED, UNSPECIFIED RETINOPATHY SEVERITY: Primary | ICD-10-CM

## 2022-10-10 DIAGNOSIS — E11.59 HYPERTENSION ASSOCIATED WITH DIABETES: ICD-10-CM

## 2022-10-10 DIAGNOSIS — Z91.199 MEDICALLY NONCOMPLIANT: ICD-10-CM

## 2022-10-10 PROCEDURE — 1160F PR REVIEW ALL MEDS BY PRESCRIBER/CLIN PHARMACIST DOCUMENTED: ICD-10-PCS | Mod: CPTII,S$GLB,, | Performed by: FAMILY MEDICINE

## 2022-10-10 PROCEDURE — 3074F PR MOST RECENT SYSTOLIC BLOOD PRESSURE < 130 MM HG: ICD-10-PCS | Mod: CPTII,S$GLB,, | Performed by: FAMILY MEDICINE

## 2022-10-10 PROCEDURE — 3008F PR BODY MASS INDEX (BMI) DOCUMENTED: ICD-10-PCS | Mod: CPTII,S$GLB,, | Performed by: FAMILY MEDICINE

## 2022-10-10 PROCEDURE — 3008F BODY MASS INDEX DOCD: CPT | Mod: CPTII,S$GLB,, | Performed by: FAMILY MEDICINE

## 2022-10-10 PROCEDURE — G0008 FLU VACCINE - QUADRIVALENT - ADJUVANTED: ICD-10-PCS | Mod: S$GLB,,, | Performed by: FAMILY MEDICINE

## 2022-10-10 PROCEDURE — 3046F HEMOGLOBIN A1C LEVEL >9.0%: CPT | Mod: CPTII,S$GLB,, | Performed by: FAMILY MEDICINE

## 2022-10-10 PROCEDURE — 1100F PTFALLS ASSESS-DOCD GE2>/YR: CPT | Mod: CPTII,S$GLB,, | Performed by: FAMILY MEDICINE

## 2022-10-10 PROCEDURE — 90694 VACC AIIV4 NO PRSRV 0.5ML IM: CPT | Mod: S$GLB,,, | Performed by: FAMILY MEDICINE

## 2022-10-10 PROCEDURE — 99999 PR PBB SHADOW E&M-EST. PATIENT-LVL III: CPT | Mod: PBBFAC,,, | Performed by: FAMILY MEDICINE

## 2022-10-10 PROCEDURE — 1100F PR PT FALLS ASSESS DOC 2+ FALLS/FALL W/INJURY/YR: ICD-10-PCS | Mod: CPTII,S$GLB,, | Performed by: FAMILY MEDICINE

## 2022-10-10 PROCEDURE — 3288F FALL RISK ASSESSMENT DOCD: CPT | Mod: CPTII,S$GLB,, | Performed by: FAMILY MEDICINE

## 2022-10-10 PROCEDURE — 3288F PR FALLS RISK ASSESSMENT DOCUMENTED: ICD-10-PCS | Mod: CPTII,S$GLB,, | Performed by: FAMILY MEDICINE

## 2022-10-10 PROCEDURE — 3046F PR MOST RECENT HEMOGLOBIN A1C LEVEL > 9.0%: ICD-10-PCS | Mod: CPTII,S$GLB,, | Performed by: FAMILY MEDICINE

## 2022-10-10 PROCEDURE — 3079F PR MOST RECENT DIASTOLIC BLOOD PRESSURE 80-89 MM HG: ICD-10-PCS | Mod: CPTII,S$GLB,, | Performed by: FAMILY MEDICINE

## 2022-10-10 PROCEDURE — 3066F PR DOCUMENTATION OF TREATMENT FOR NEPHROPATHY: ICD-10-PCS | Mod: CPTII,S$GLB,, | Performed by: FAMILY MEDICINE

## 2022-10-10 PROCEDURE — 90694 FLU VACCINE - QUADRIVALENT - ADJUVANTED: ICD-10-PCS | Mod: S$GLB,,, | Performed by: FAMILY MEDICINE

## 2022-10-10 PROCEDURE — G0008 ADMIN INFLUENZA VIRUS VAC: HCPCS | Mod: S$GLB,,, | Performed by: FAMILY MEDICINE

## 2022-10-10 PROCEDURE — 3060F PR POS MICROALBUMINURIA RESULT DOCUMENTED/REVIEW: ICD-10-PCS | Mod: CPTII,S$GLB,, | Performed by: FAMILY MEDICINE

## 2022-10-10 PROCEDURE — 1126F AMNT PAIN NOTED NONE PRSNT: CPT | Mod: CPTII,S$GLB,, | Performed by: FAMILY MEDICINE

## 2022-10-10 PROCEDURE — 3066F NEPHROPATHY DOC TX: CPT | Mod: CPTII,S$GLB,, | Performed by: FAMILY MEDICINE

## 2022-10-10 PROCEDURE — 99214 OFFICE O/P EST MOD 30 MIN: CPT | Mod: 25,S$GLB,, | Performed by: FAMILY MEDICINE

## 2022-10-10 PROCEDURE — 3074F SYST BP LT 130 MM HG: CPT | Mod: CPTII,S$GLB,, | Performed by: FAMILY MEDICINE

## 2022-10-10 PROCEDURE — 3060F POS MICROALBUMINURIA REV: CPT | Mod: CPTII,S$GLB,, | Performed by: FAMILY MEDICINE

## 2022-10-10 PROCEDURE — 3079F DIAST BP 80-89 MM HG: CPT | Mod: CPTII,S$GLB,, | Performed by: FAMILY MEDICINE

## 2022-10-10 PROCEDURE — 1160F RVW MEDS BY RX/DR IN RCRD: CPT | Mod: CPTII,S$GLB,, | Performed by: FAMILY MEDICINE

## 2022-10-10 PROCEDURE — 1126F PR PAIN SEVERITY QUANTIFIED, NO PAIN PRESENT: ICD-10-PCS | Mod: CPTII,S$GLB,, | Performed by: FAMILY MEDICINE

## 2022-10-10 PROCEDURE — 1159F PR MEDICATION LIST DOCUMENTED IN MEDICAL RECORD: ICD-10-PCS | Mod: CPTII,S$GLB,, | Performed by: FAMILY MEDICINE

## 2022-10-10 PROCEDURE — 1159F MED LIST DOCD IN RCRD: CPT | Mod: CPTII,S$GLB,, | Performed by: FAMILY MEDICINE

## 2022-10-10 PROCEDURE — 99999 PR PBB SHADOW E&M-EST. PATIENT-LVL III: ICD-10-PCS | Mod: PBBFAC,,, | Performed by: FAMILY MEDICINE

## 2022-10-10 PROCEDURE — 99214 PR OFFICE/OUTPT VISIT, EST, LEVL IV, 30-39 MIN: ICD-10-PCS | Mod: 25,S$GLB,, | Performed by: FAMILY MEDICINE

## 2022-10-10 RX ORDER — CLOTRIMAZOLE AND BETAMETHASONE DIPROPIONATE 10; .64 MG/G; MG/G
CREAM TOPICAL 2 TIMES DAILY
Qty: 45 G | Refills: 2 | Status: SHIPPED | OUTPATIENT
Start: 2022-10-10

## 2022-10-10 NOTE — PROGRESS NOTES
Subjective:       Patient ID: Jeanine Chandler is a 67 y.o. female.    Chief Complaint: Urinary Tract Infection and Follow-up (ER f/u )    Pt is known to me.  The pt was recently in ER with elevated glucose and UTI.  She is on Cipro and feel much better.  The pt reports that she had stopped all her meds for about a month--she reasoned that because she did not have working glucose monitor there was not sense in taking the meds.  She has restarted her meds.  She has a new Dexcom 6 waiting for her at her pharmacy.      Review of Systems   Constitutional:  Negative for activity change, appetite change, fatigue and unexpected weight change.   Eyes:  Negative for visual disturbance.   Respiratory:  Negative for cough, chest tightness and shortness of breath.    Cardiovascular:  Negative for chest pain, palpitations and leg swelling.   Gastrointestinal:  Negative for abdominal pain, constipation, diarrhea, nausea and vomiting.   Endocrine: Negative for cold intolerance, heat intolerance and polyuria.   Genitourinary:  Negative for decreased urine volume and dysuria.   Musculoskeletal:  Negative for arthralgias and back pain.   Skin:  Negative for rash.   Neurological:  Negative for numbness and headaches.     Objective:      Physical Exam  Vitals and nursing note reviewed.   Constitutional:       Appearance: Normal appearance. She is well-developed and well-groomed. She is obese.   HENT:      Head: Normocephalic and atraumatic.      Right Ear: Hearing and external ear normal.      Left Ear: Hearing and external ear normal.      Mouth/Throat:      Lips: Pink.   Eyes:      General: Lids are normal.      Extraocular Movements: Extraocular movements intact.      Conjunctiva/sclera: Conjunctivae normal.   Neck:      Trachea: Phonation normal.   Cardiovascular:      Rate and Rhythm: Normal rate. Rhythm irregularly irregular.      Heart sounds: Normal heart sounds. No murmur heard.    No friction rub. No gallop.   Pulmonary:       Effort: Pulmonary effort is normal. No respiratory distress.      Breath sounds: Normal breath sounds. No decreased breath sounds, wheezing, rhonchi or rales.   Abdominal:      General: Bowel sounds are normal.      Tenderness: There is no abdominal tenderness. There is no right CVA tenderness or left CVA tenderness.   Musculoskeletal:         General: Normal range of motion.   Skin:     General: Skin is warm and dry.      Findings: No rash.   Neurological:      General: No focal deficit present.      Mental Status: She is alert and oriented to person, place, and time.      Comments: Shuffling gait.   Psychiatric:         Mood and Affect: Mood normal.         Behavior: Behavior normal. Behavior is cooperative.       Assessment:       1. Type 2 diabetes mellitus with retinopathy of both eyes, with long-term current use of insulin, macular edema presence unspecified, unspecified retinopathy severity    2. Polyp of colon, unspecified part of colon, unspecified type    3. Eczema, unspecified type    4. Hypertension associated with diabetes    5. Medically noncompliant        Plan:       Jeanine was seen today for urinary tract infection and follow-up.    Diagnoses and all orders for this visit:    Type 2 diabetes mellitus with retinopathy of both eyes, with long-term current use of insulin, macular edema presence unspecified, unspecified retinopathy severity    Polyp of colon, unspecified part of colon, unspecified type  -     Case Request Endoscopy: COLONOSCOPY    Eczema, unspecified type  -     clotrimazole-betamethasone 1-0.05% (LOTRISONE) cream; Apply topically 2 (two) times daily.    Hypertension associated with diabetes    Medically noncompliant    Other orders  -     Influenza - Quadrivalent (Adjuvanted)    During this visit, I reviewed the pt's history, medications, allergies, and problem list.

## 2022-10-12 ENCOUNTER — TELEPHONE (OUTPATIENT)
Dept: FAMILY MEDICINE | Facility: CLINIC | Age: 67
End: 2022-10-12
Payer: MEDICARE

## 2022-10-12 ENCOUNTER — PATIENT MESSAGE (OUTPATIENT)
Dept: FAMILY MEDICINE | Facility: CLINIC | Age: 67
End: 2022-10-12
Payer: MEDICARE

## 2022-10-13 ENCOUNTER — OFFICE VISIT (OUTPATIENT)
Dept: OPTOMETRY | Facility: CLINIC | Age: 67
End: 2022-10-13
Payer: MEDICARE

## 2022-10-13 DIAGNOSIS — H52.7 REFRACTIVE ERROR: ICD-10-CM

## 2022-10-13 DIAGNOSIS — E11.3293 MILD NONPROLIFERATIVE DIABETIC RETINOPATHY OF BOTH EYES WITHOUT MACULAR EDEMA ASSOCIATED WITH TYPE 2 DIABETES MELLITUS: Primary | ICD-10-CM

## 2022-10-13 DIAGNOSIS — H02.9 LESION OF EYELID: ICD-10-CM

## 2022-10-13 DIAGNOSIS — H25.13 NUCLEAR SCLEROSIS OF BOTH EYES: ICD-10-CM

## 2022-10-13 PROCEDURE — 92004 PR EYE EXAM, NEW PATIENT,COMPREHESV: ICD-10-PCS | Mod: S$PBB,,, | Performed by: OPTOMETRIST

## 2022-10-13 PROCEDURE — 99999 PR PBB SHADOW E&M-EST. PATIENT-LVL IV: CPT | Mod: PBBFAC,,, | Performed by: OPTOMETRIST

## 2022-10-13 PROCEDURE — 99214 OFFICE O/P EST MOD 30 MIN: CPT | Mod: PBBFAC,PO | Performed by: OPTOMETRIST

## 2022-10-13 PROCEDURE — 99999 PR PBB SHADOW E&M-EST. PATIENT-LVL IV: ICD-10-PCS | Mod: PBBFAC,,, | Performed by: OPTOMETRIST

## 2022-10-13 PROCEDURE — 92004 COMPRE OPH EXAM NEW PT 1/>: CPT | Mod: S$PBB,,, | Performed by: OPTOMETRIST

## 2022-10-13 NOTE — PROGRESS NOTES
HPI    New pt here for annual DM eye exam DLS- JAN 2022 by outside provider     Pt states her va is stable with specs. DM is stabling out on meds, HTN is   stable on meds. Pt denies floaters and FOL. Pt is not using any GTTS.     Hemoglobin A1C       Date                     Value               Ref Range             Status                08/05/2022               12.4 (H)            4.0 - 5.6 %           Final              Comment:    ADA Screening Guidelines:  5.7-6.4%  Consistent with   prediabetes  >or=6.5%  Consistent with diabetes    High levels of fetal   hemoglobin interfere with the HbA1C  assay. Heterozygous hemoglobin   variants (HbS, HgC, etc)do  not significantly interfere with this assay.     However, presence of multiple variants may affect accuracy.         04/25/2022               11.5 (H)            4.0 - 5.6 %           Final              Comment:    ADA Screening Guidelines:  5.7-6.4%  Consistent with   prediabetes  >or=6.5%  Consistent with diabetes    High levels of fetal   hemoglobin interfere with the HbA1C  assay. Heterozygous hemoglobin   variants (HbS, HgC, etc)do  not significantly interfere with this assay.     However, presence of multiple variants may affect accuracy.         09/14/2020               6.8 (H)             <5.7 % of tota*       Final              Comment:    For someone without known diabetes, a hemoglobin A1c  value of 6.5% or   greater indicates that they may have   diabetes and this should be   confirmed with a follow-up   test.     For someone with known diabetes, a   value <7% indicates   that their diabetes is well controlled and a value     greater than or equal to 7% indicates suboptimal   control. A1c targets   should be individualized based on   duration of diabetes, age, comorbid   conditions, and   other considerations.     Currently, no consensus exists   regarding use of  hemoglobin A1c for diagnosis of diabetes for children.          ----------   Last edited  by Mercedez Fermin on 10/13/2022  3:07 PM.            Assessment /Plan     For exam results, see Encounter Report.    Mild nonproliferative diabetic retinopathy of both eyes without macular edema associated with type 2 diabetes mellitus    Nuclear sclerosis of both eyes    Lesion of eyelid    Refractive error    1-2 small MA's OU,  no csme. Return in 1 year for dilated eye exam.   2. Educated pt on presence of cataracts and effects on vision. No surgery at this time. Recheck in one year.   3. Refer to oculoplastics for eval     4. Copy of spec rx given

## 2022-10-20 ENCOUNTER — TELEPHONE (OUTPATIENT)
Dept: OPHTHALMOLOGY | Facility: CLINIC | Age: 67
End: 2022-10-20
Payer: MEDICARE

## 2022-10-20 NOTE — TELEPHONE ENCOUNTER
Mailbox full / mychart msg sent  No available appt times at this time. I have added patient to my waitlist. I will call patient back for scheduling when we get availability.

## 2022-10-20 NOTE — TELEPHONE ENCOUNTER
----- Message from Barb Kirby sent at 10/14/2022 11:46 AM CDT -----  Regarding: FW: Lesion of eyelid    ----- Message -----  From: Toby Ellis MA  Sent: 10/14/2022  10:59 AM CDT  To: [facility] Staff  Subject: Lesion of eyelid                                 Please schedule

## 2022-10-23 PROBLEM — I48.91 ATRIAL FIBRILLATION WITH RVR: Status: ACTIVE | Noted: 2022-10-23

## 2022-10-23 PROBLEM — G45.9 TIA (TRANSIENT ISCHEMIC ATTACK): Status: ACTIVE | Noted: 2022-10-23

## 2022-10-24 ENCOUNTER — TELEPHONE (OUTPATIENT)
Dept: GASTROENTEROLOGY | Facility: CLINIC | Age: 67
End: 2022-10-24
Payer: MEDICARE

## 2022-10-24 NOTE — TELEPHONE ENCOUNTER
Attempted to contact patient to schedule colonoscopy from PCP request. Mailbox full, unable to leave message.

## 2022-10-25 PROBLEM — R93.89 ABNORMAL COMPUTED TOMOGRAPHY ANGIOGRAPHY (CTA) OF NECK: Status: ACTIVE | Noted: 2022-10-25

## 2022-10-27 ENCOUNTER — TELEPHONE (OUTPATIENT)
Dept: CARDIOLOGY | Facility: CLINIC | Age: 67
End: 2022-10-27
Payer: MEDICARE

## 2022-10-27 ENCOUNTER — TELEPHONE (OUTPATIENT)
Dept: VASCULAR SURGERY | Facility: CLINIC | Age: 67
End: 2022-10-27
Payer: MEDICARE

## 2022-10-27 NOTE — TELEPHONE ENCOUNTER
----- Message from Emma Conn sent at 10/26/2022  4:26 PM CDT -----  Regarding: pt  called  Name of Who is Calling: ADA BOONE [[id number]] Mark ( )      What is the request in detail: pt needs a f/u appt from being discharged from the hospital . Please advise       Can the clinic reply by [facility]: No      What [facility] [phone]

## 2022-10-27 NOTE — TELEPHONE ENCOUNTER
----- Message from Matthew Golden sent at 10/27/2022  9:37 AM CDT -----  Type:  Patient Returning Call    Who Called:  / Mark Chandler  Who Left Message for Patient:  Giselle  Does the patient know what this is regarding?:  Sooner appointment  Best Call Back Number:  [phone]  Additional Information:

## 2022-10-27 NOTE — TELEPHONE ENCOUNTER
----- Message from Giselle Figueroa LPN sent at 10/26/2022  4:50 PM CDT -----  Regarding: FW: pt  called    ----- Message -----  From: Emma Conn  Sent: 10/26/2022   4:31 PM CDT  To: Kyle Mccarty Staff  Subject: pt  called                                Name of Who is Calling: ADA BOONE [[id number]] Mark ( )      What is the request in detail: please contact  to set up appt for a f/u due to her being discharged out of hospital. Please advise       Can the clinic reply by [facility]: No      What [facility] [phone]

## 2022-10-28 ENCOUNTER — TELEPHONE (OUTPATIENT)
Dept: CARDIOLOGY | Facility: CLINIC | Age: 67
End: 2022-10-28
Payer: MEDICARE

## 2022-10-28 NOTE — TELEPHONE ENCOUNTER
Spoke to : he is asking for memory assessment or test for his wife after TIA; advised to contact PCP

## 2022-10-28 NOTE — TELEPHONE ENCOUNTER
----- Message from Demetrius Ruiz sent at 10/28/2022 11:07 AM CDT -----  Contact: Mark at [phone]  Type: Needs Medical Advice  Who Called:  pt's     Best Call Back Number: [phone]  Additional Information: pt's  is calling the office to request a call back in regards to upcoming appointment. Please advise.

## 2022-10-31 ENCOUNTER — TELEPHONE (OUTPATIENT)
Dept: FAMILY MEDICINE | Facility: CLINIC | Age: 67
End: 2022-10-31
Payer: MEDICARE

## 2022-10-31 NOTE — TELEPHONE ENCOUNTER
----- Message from Jessica Miranda sent at 10/28/2022  1:28 PM CDT -----  Regarding: SAME DAY CALL BACK  Type: Patient Call Back         Who called:  Praful Flores         What is the request in detail: pt has appt sched fr 11/1;  is requesting a call back regarding this appt; requesting to speak with nurse asap; please advise      Best call back number:  [phone]         Additional Information:            Thank You

## 2022-11-01 ENCOUNTER — TELEPHONE (OUTPATIENT)
Dept: FAMILY MEDICINE | Facility: CLINIC | Age: 67
End: 2022-11-01

## 2022-11-01 NOTE — TELEPHONE ENCOUNTER
----- Message from Brandan Clement sent at 10/31/2022  3:27 PM CDT -----  Who Called:pt       What is the reqeust in detail: pt is requesting a call back to reschedule appt. Please advise       Can the clinic reply by [facility]? No       Best Call Back Number:[phone]      Additional Information:

## 2022-11-02 ENCOUNTER — OFFICE VISIT (OUTPATIENT)
Dept: VASCULAR SURGERY | Facility: CLINIC | Age: 67
End: 2022-11-02
Payer: MEDICARE

## 2022-11-02 VITALS
WEIGHT: 175.94 LBS | BODY MASS INDEX: 32.37 KG/M2 | HEIGHT: 62 IN | HEART RATE: 102 BPM | DIASTOLIC BLOOD PRESSURE: 104 MMHG | SYSTOLIC BLOOD PRESSURE: 163 MMHG

## 2022-11-02 DIAGNOSIS — R93.89 ABNORMAL COMPUTED TOMOGRAPHY ANGIOGRAPHY (CTA) OF NECK: Primary | ICD-10-CM

## 2022-11-02 DIAGNOSIS — I65.23 BILATERAL CAROTID ARTERY STENOSIS: Primary | ICD-10-CM

## 2022-11-02 DIAGNOSIS — I65.23 BILATERAL CAROTID ARTERY STENOSIS: ICD-10-CM

## 2022-11-02 PROCEDURE — 99204 PR OFFICE/OUTPT VISIT, NEW, LEVL IV, 45-59 MIN: ICD-10-PCS | Mod: S$GLB,,, | Performed by: THORACIC SURGERY (CARDIOTHORACIC VASCULAR SURGERY)

## 2022-11-02 PROCEDURE — 99204 OFFICE O/P NEW MOD 45 MIN: CPT | Mod: S$GLB,,, | Performed by: THORACIC SURGERY (CARDIOTHORACIC VASCULAR SURGERY)

## 2022-11-02 NOTE — PROGRESS NOTES
This patient was referred to the office with carotid occlusive disease.  She had an episode of transient ischemic attack and was admitted at [facility].  She had a number of studies including a CTA showing moderate carotid occlusive disease bilaterally with carotid disease with in its cavernous segments bilaterally.  She has episodic atrial fibrillation.  She has been on Xarelto for this.  Other medicines are part of the epic record.  She does take Lipitor.    She has had no recent surgeries.  She denies any history of heart disease.  She is not a smoker.    On exam vital signs are stable.  Pupils are equal and round reactive to light.  Neck is supple.  Chest is equal breath sounds.  Heart is in a irregular rate and rhythm.  Abdomen is benign.  Perfusion to the legs and feet seems to be satisfactory.    The recent studies were reviewed and the patient has moderate carotid occlusive disease both in the carotid bulbs bilaterally and its cavernous segments.  She also has chronic atrial fibrillation.    Recommendation is for medical management with the addition of aspirin to Xarelto.  The patient seems to be understanding.  If the TIAs recur I told her to call the office.    Recommendation is for a repeat carotid ultrasound in 3-4 months.

## 2022-11-03 ENCOUNTER — OFFICE VISIT (OUTPATIENT)
Dept: FAMILY MEDICINE | Facility: CLINIC | Age: 67
End: 2022-11-03
Payer: MEDICARE

## 2022-11-03 ENCOUNTER — OFFICE VISIT (OUTPATIENT)
Dept: CARDIOLOGY | Facility: CLINIC | Age: 67
End: 2022-11-03
Payer: MEDICARE

## 2022-11-03 ENCOUNTER — PATIENT OUTREACH (OUTPATIENT)
Dept: ADMINISTRATIVE | Facility: HOSPITAL | Age: 67
End: 2022-11-03
Payer: MEDICARE

## 2022-11-03 VITALS
DIASTOLIC BLOOD PRESSURE: 65 MMHG | SYSTOLIC BLOOD PRESSURE: 110 MMHG | BODY MASS INDEX: 30.28 KG/M2 | HEART RATE: 89 BPM | WEIGHT: 165.56 LBS

## 2022-11-03 VITALS
OXYGEN SATURATION: 99 % | HEART RATE: 72 BPM | WEIGHT: 165.56 LBS | DIASTOLIC BLOOD PRESSURE: 82 MMHG | SYSTOLIC BLOOD PRESSURE: 122 MMHG | BODY MASS INDEX: 30.28 KG/M2

## 2022-11-03 DIAGNOSIS — N18.31 CHRONIC KIDNEY DISEASE, STAGE 3A: ICD-10-CM

## 2022-11-03 DIAGNOSIS — E11.319 TYPE 2 DIABETES MELLITUS WITH RETINOPATHY OF BOTH EYES, WITH LONG-TERM CURRENT USE OF INSULIN, MACULAR EDEMA PRESENCE UNSPECIFIED, UNSPECIFIED RETINOPATHY SEVERITY: ICD-10-CM

## 2022-11-03 DIAGNOSIS — G45.9 TIA (TRANSIENT ISCHEMIC ATTACK): ICD-10-CM

## 2022-11-03 DIAGNOSIS — I48.0 PAROXYSMAL ATRIAL FIBRILLATION: ICD-10-CM

## 2022-11-03 DIAGNOSIS — I48.91 ATRIAL FIBRILLATION WITH RVR: ICD-10-CM

## 2022-11-03 DIAGNOSIS — Z79.4 TYPE 2 DIABETES MELLITUS WITH RETINOPATHY OF BOTH EYES, WITH LONG-TERM CURRENT USE OF INSULIN, MACULAR EDEMA PRESENCE UNSPECIFIED, UNSPECIFIED RETINOPATHY SEVERITY: ICD-10-CM

## 2022-11-03 DIAGNOSIS — I15.2 HYPERTENSION ASSOCIATED WITH DIABETES: ICD-10-CM

## 2022-11-03 DIAGNOSIS — D64.9 ANEMIA, UNSPECIFIED TYPE: Primary | ICD-10-CM

## 2022-11-03 DIAGNOSIS — Z79.4 ENCOUNTER FOR LONG-TERM (CURRENT) USE OF INSULIN: ICD-10-CM

## 2022-11-03 DIAGNOSIS — E11.65 UNCONTROLLED TYPE 2 DIABETES MELLITUS WITH HYPERGLYCEMIA: Primary | ICD-10-CM

## 2022-11-03 DIAGNOSIS — E11.59 HYPERTENSION ASSOCIATED WITH DIABETES: ICD-10-CM

## 2022-11-03 DIAGNOSIS — E78.2 MIXED HYPERLIPIDEMIA: ICD-10-CM

## 2022-11-03 PROCEDURE — 3060F PR POS MICROALBUMINURIA RESULT DOCUMENTED/REVIEW: ICD-10-PCS | Mod: CPTII,S$GLB,, | Performed by: STUDENT IN AN ORGANIZED HEALTH CARE EDUCATION/TRAINING PROGRAM

## 2022-11-03 PROCEDURE — 1160F RVW MEDS BY RX/DR IN RCRD: CPT | Mod: CPTII,S$GLB,, | Performed by: STUDENT IN AN ORGANIZED HEALTH CARE EDUCATION/TRAINING PROGRAM

## 2022-11-03 PROCEDURE — 3008F BODY MASS INDEX DOCD: CPT | Mod: CPTII,S$GLB,, | Performed by: STUDENT IN AN ORGANIZED HEALTH CARE EDUCATION/TRAINING PROGRAM

## 2022-11-03 PROCEDURE — 3288F PR FALLS RISK ASSESSMENT DOCUMENTED: ICD-10-PCS | Mod: CPTII,S$GLB,, | Performed by: STUDENT IN AN ORGANIZED HEALTH CARE EDUCATION/TRAINING PROGRAM

## 2022-11-03 PROCEDURE — 99999 PR PBB SHADOW E&M-EST. PATIENT-LVL IV: CPT | Mod: PBBFAC,,, | Performed by: PHYSICIAN ASSISTANT

## 2022-11-03 PROCEDURE — 99214 OFFICE O/P EST MOD 30 MIN: CPT | Mod: S$GLB,,, | Performed by: PHYSICIAN ASSISTANT

## 2022-11-03 PROCEDURE — 3046F HEMOGLOBIN A1C LEVEL >9.0%: CPT | Mod: CPTII,S$GLB,, | Performed by: STUDENT IN AN ORGANIZED HEALTH CARE EDUCATION/TRAINING PROGRAM

## 2022-11-03 PROCEDURE — 1126F AMNT PAIN NOTED NONE PRSNT: CPT | Mod: CPTII,S$GLB,, | Performed by: STUDENT IN AN ORGANIZED HEALTH CARE EDUCATION/TRAINING PROGRAM

## 2022-11-03 PROCEDURE — 1100F PTFALLS ASSESS-DOCD GE2>/YR: CPT | Mod: CPTII,S$GLB,, | Performed by: STUDENT IN AN ORGANIZED HEALTH CARE EDUCATION/TRAINING PROGRAM

## 2022-11-03 PROCEDURE — 3074F SYST BP LT 130 MM HG: CPT | Mod: CPTII,S$GLB,, | Performed by: STUDENT IN AN ORGANIZED HEALTH CARE EDUCATION/TRAINING PROGRAM

## 2022-11-03 PROCEDURE — 99999 PR PBB SHADOW E&M-EST. PATIENT-LVL IV: ICD-10-PCS | Mod: PBBFAC,,, | Performed by: PHYSICIAN ASSISTANT

## 2022-11-03 PROCEDURE — 3074F PR MOST RECENT SYSTOLIC BLOOD PRESSURE < 130 MM HG: ICD-10-PCS | Mod: CPTII,S$GLB,, | Performed by: STUDENT IN AN ORGANIZED HEALTH CARE EDUCATION/TRAINING PROGRAM

## 2022-11-03 PROCEDURE — 3046F PR MOST RECENT HEMOGLOBIN A1C LEVEL > 9.0%: ICD-10-PCS | Mod: CPTII,S$GLB,, | Performed by: STUDENT IN AN ORGANIZED HEALTH CARE EDUCATION/TRAINING PROGRAM

## 2022-11-03 PROCEDURE — 1159F MED LIST DOCD IN RCRD: CPT | Mod: CPTII,S$GLB,, | Performed by: STUDENT IN AN ORGANIZED HEALTH CARE EDUCATION/TRAINING PROGRAM

## 2022-11-03 PROCEDURE — 99999 PR PBB SHADOW E&M-EST. PATIENT-LVL V: ICD-10-PCS | Mod: PBBFAC,,, | Performed by: STUDENT IN AN ORGANIZED HEALTH CARE EDUCATION/TRAINING PROGRAM

## 2022-11-03 PROCEDURE — 99999 PR PBB SHADOW E&M-EST. PATIENT-LVL V: CPT | Mod: PBBFAC,,, | Performed by: STUDENT IN AN ORGANIZED HEALTH CARE EDUCATION/TRAINING PROGRAM

## 2022-11-03 PROCEDURE — 1160F PR REVIEW ALL MEDS BY PRESCRIBER/CLIN PHARMACIST DOCUMENTED: ICD-10-PCS | Mod: CPTII,S$GLB,, | Performed by: STUDENT IN AN ORGANIZED HEALTH CARE EDUCATION/TRAINING PROGRAM

## 2022-11-03 PROCEDURE — 3288F FALL RISK ASSESSMENT DOCD: CPT | Mod: CPTII,S$GLB,, | Performed by: STUDENT IN AN ORGANIZED HEALTH CARE EDUCATION/TRAINING PROGRAM

## 2022-11-03 PROCEDURE — 99214 PR OFFICE/OUTPT VISIT, EST, LEVL IV, 30-39 MIN: ICD-10-PCS | Mod: S$GLB,,, | Performed by: STUDENT IN AN ORGANIZED HEALTH CARE EDUCATION/TRAINING PROGRAM

## 2022-11-03 PROCEDURE — 3060F POS MICROALBUMINURIA REV: CPT | Mod: CPTII,S$GLB,, | Performed by: STUDENT IN AN ORGANIZED HEALTH CARE EDUCATION/TRAINING PROGRAM

## 2022-11-03 PROCEDURE — 3066F NEPHROPATHY DOC TX: CPT | Mod: CPTII,S$GLB,, | Performed by: STUDENT IN AN ORGANIZED HEALTH CARE EDUCATION/TRAINING PROGRAM

## 2022-11-03 PROCEDURE — 3079F DIAST BP 80-89 MM HG: CPT | Mod: CPTII,S$GLB,, | Performed by: STUDENT IN AN ORGANIZED HEALTH CARE EDUCATION/TRAINING PROGRAM

## 2022-11-03 PROCEDURE — 3008F PR BODY MASS INDEX (BMI) DOCUMENTED: ICD-10-PCS | Mod: CPTII,S$GLB,, | Performed by: STUDENT IN AN ORGANIZED HEALTH CARE EDUCATION/TRAINING PROGRAM

## 2022-11-03 PROCEDURE — 3079F PR MOST RECENT DIASTOLIC BLOOD PRESSURE 80-89 MM HG: ICD-10-PCS | Mod: CPTII,S$GLB,, | Performed by: STUDENT IN AN ORGANIZED HEALTH CARE EDUCATION/TRAINING PROGRAM

## 2022-11-03 PROCEDURE — 1126F PR PAIN SEVERITY QUANTIFIED, NO PAIN PRESENT: ICD-10-PCS | Mod: CPTII,S$GLB,, | Performed by: STUDENT IN AN ORGANIZED HEALTH CARE EDUCATION/TRAINING PROGRAM

## 2022-11-03 PROCEDURE — 1159F PR MEDICATION LIST DOCUMENTED IN MEDICAL RECORD: ICD-10-PCS | Mod: CPTII,S$GLB,, | Performed by: STUDENT IN AN ORGANIZED HEALTH CARE EDUCATION/TRAINING PROGRAM

## 2022-11-03 PROCEDURE — 3066F PR DOCUMENTATION OF TREATMENT FOR NEPHROPATHY: ICD-10-PCS | Mod: CPTII,S$GLB,, | Performed by: STUDENT IN AN ORGANIZED HEALTH CARE EDUCATION/TRAINING PROGRAM

## 2022-11-03 PROCEDURE — 99214 OFFICE O/P EST MOD 30 MIN: CPT | Mod: S$GLB,,, | Performed by: STUDENT IN AN ORGANIZED HEALTH CARE EDUCATION/TRAINING PROGRAM

## 2022-11-03 PROCEDURE — 99214 PR OFFICE/OUTPT VISIT, EST, LEVL IV, 30-39 MIN: ICD-10-PCS | Mod: S$GLB,,, | Performed by: PHYSICIAN ASSISTANT

## 2022-11-03 PROCEDURE — 1100F PR PT FALLS ASSESS DOC 2+ FALLS/FALL W/INJURY/YR: ICD-10-PCS | Mod: CPTII,S$GLB,, | Performed by: STUDENT IN AN ORGANIZED HEALTH CARE EDUCATION/TRAINING PROGRAM

## 2022-11-03 NOTE — PROGRESS NOTES
Subjective:    Patient ID:  Jeanine Chandler is a 67 y.o. female who presents for follow-up of TIA.       HPI  Ms. Chandler is a very pleasant lady who follows with Dr. Wright. She presents today for follow up after a recent hospitalization for TIA. EMS was called for aphasia, but symptoms had resolved by the time she got to the ED. Workup was notable for a CTA that revealed focal dense atheromatous calcification of the bilateral carotid bulbs and proximal right internal carotid artery with mild stenosis and focal severe stenosis at the origin of the left external carotid artery. She saw Dr. Galindo yesterday and the plan is for repeat US in 3-4 months. An ASA 81mg daily was added as well.     She has had no further neurologic symptoms since discharge.     Review of Systems   Constitutional: Negative for chills, diaphoresis, fever, weight gain and weight loss.   HENT:  Negative for sore throat.    Eyes:  Negative for blurred vision, vision loss in left eye, vision loss in right eye and visual disturbance.   Cardiovascular:  Negative for chest pain, claudication, dyspnea on exertion, leg swelling, near-syncope, orthopnea, palpitations, paroxysmal nocturnal dyspnea and syncope.   Respiratory:  Negative for cough, hemoptysis, shortness of breath, sputum production and wheezing.    Endocrine: Negative for cold intolerance and heat intolerance.   Hematologic/Lymphatic: Negative for adenopathy. Does not bruise/bleed easily.   Skin:  Negative for rash.   Musculoskeletal:  Negative for falls, muscle weakness and myalgias.   Gastrointestinal:  Negative for abdominal pain, change in bowel habit, constipation, diarrhea, melena and nausea.   Genitourinary:  Negative for bladder incontinence.   Neurological:  Negative for dizziness, focal weakness, headaches, light-headedness, numbness and weakness.   Psychiatric/Behavioral:  Negative for altered mental status.         Vitals:    11/03/22 1429   BP: 110/65   BP Location:  Right arm   Patient Position: Sitting   BP Method: Medium (Automatic)   Pulse: 89   Weight: 75.1 kg (165 lb 9.1 oz)   Body mass index is 30.28 kg/m².    Objective:    Physical Exam  Constitutional:       General: She is not in acute distress.     Appearance: She is well-developed.   HENT:      Head: Normocephalic and atraumatic.   Eyes:      General: No scleral icterus.     Conjunctiva/sclera: Conjunctivae normal.      Pupils: Pupils are equal, round, and reactive to light.   Neck:      Vascular: No JVD.      Trachea: No tracheal deviation.   Cardiovascular:      Rate and Rhythm: Normal rate. Rhythm irregularly irregular.      Heart sounds: No murmur heard.    No friction rub. No gallop.   Pulmonary:      Effort: Pulmonary effort is normal. No respiratory distress.      Breath sounds: Normal breath sounds. No wheezing or rales.   Chest:      Chest wall: No tenderness.   Abdominal:      General: Bowel sounds are normal. There is no distension.      Palpations: Abdomen is soft.      Tenderness: There is no abdominal tenderness.   Musculoskeletal:         General: No tenderness.      Cervical back: Neck supple.   Skin:     General: Skin is warm and dry.      Findings: No erythema or rash.   Neurological:      Mental Status: She is alert and oriented to person, place, and time.   Psychiatric:         Behavior: Behavior normal.         Assessment:       Problem List Items Addressed This Visit          Cardiology Problems    Hypertension associated with diabetes    Mixed hyperlipidemia    Paroxysmal atrial fibrillation    TIA (transient ischemic attack)       Other    Chronic kidney disease, stage 3a    Type 2 diabetes mellitus with retinopathy of both eyes, with long-term current use of insulin     Other Visit Diagnoses       Anemia, unspecified type    -  Primary    Relevant Orders    CBC Auto Differential             Plan:       Continue current cardiac medications.   Risk factor modification including diet and  exercise.   F/U with Dr. Wright as scheduled.

## 2022-11-03 NOTE — PROGRESS NOTES
Subjective:       Patient ID: Jeanine Chandler is a 67 y.o. female.    Chief Complaint: Follow-up    Presents for hospital discharge follow up, presented to ED with headache, found to have TIA, now reports no focal deficits, headache resolved, discharged 10/24  Reports prior to hospitalization, patient was nonadherent to medications due to perceived lack of benefit, reports compliant with medications, denies current hyper- or hypoglycemia symptoms, aware of diabetic principles      Follow-up  Pertinent negatives include no abdominal pain, chest pain, chills, diaphoresis, fever, headaches or weakness.     Review of Systems   Constitutional:  Negative for chills, diaphoresis and fever.   Eyes:  Negative for blurred vision.   Respiratory:  Negative for shortness of breath.    Cardiovascular:  Negative for chest pain and palpitations.        Dyspnea on exertion,    Gastrointestinal:  Negative for abdominal pain and heartburn.   Genitourinary:  Negative for frequency and urgency.   Neurological:  Negative for dizziness, tingling, weakness and headaches.        No focal neurological changes   Endo/Heme/Allergies:         Denies hot or cold intolerance, Denies other hyper- or hypo-glycemia symptoms      Objective:      Vitals:    11/03/22 1131   BP: 122/82   Pulse: 72   SpO2: 99%   Weight: 75.1 kg (165 lb 9.1 oz)      Physical Exam  Constitutional:       General: She is not in acute distress.  Eyes:      General: No scleral icterus.     Conjunctiva/sclera: Conjunctivae normal.   Cardiovascular:      Rate and Rhythm: Normal rate. Rhythm irregular.      Pulses: Normal pulses.      Comments: Good skin turgor, No edema  Pulmonary:      Comments: Respirations symmetric and not labored  Musculoskeletal:         General: Normal range of motion.      Cervical back: Neck supple.      Right lower leg: No edema.      Left lower leg: No edema.      Comments: Normal strength   Skin:     General: Skin is warm and dry.      Findings: No  lesion or rash.   Neurological:      General: No focal deficit present.      Mental Status: She is alert and oriented to person, place, and time.   Psychiatric:         Mood and Affect: Mood normal.         Behavior: Behavior normal.      Comments: Cooperative, Appropriate affect        Assessment:       1. Uncontrolled type 2 diabetes mellitus with hyperglycemia    2. Atrial fibrillation with RVR    3. TIA (transient ischemic attack)    4. Chronic kidney disease, stage 3a    5. Encounter for long-term (current) use of insulin          Plan:       Uncontrolled type 2 diabetes mellitus with hyperglycemia  Uncontrolled, worsening  CBC, CMP, A1c, hospital imaging and documentation reviewed  Will increase dose of lantus from 34 to 38 units daily, will continue to increase 2 units q 2 days until fasting AM Glu at goal of 100-150, will decrease daytime TID sliding scale insulin if needed, continue TID glucose monitoring with dexcom, otherwise continue current regimen, metformin, ozempic, on ARB, on LLT  ED/medication/hyper/hypoglycemia precautions, counseling provided    Atrial fibrillation with RVR  Rate controlled in clinic today  Vascular surgery recommendations reviewed,   Will continue metoprolol, ASA, xarelto, monitor for continued improvement    TIA (transient ischemic attack)  BP at goal in clinic today  No focal deficits,   Vascular surgery recommendations reviewed,   Will continue metoprolol, ASA, xarelto, monitor for continued improvement    Chronic kidney disease, stage 3a  Improved, on ACEi, continue metformin given current GFR, will avoid nephrotoxic drugs, monitor for continued improvement    Encounter for long-term (current) use of insulin   As#1, medication precautions provided    Risks, benefits, alternatives discussed with patient, asked if there were any questions, patient said no  Precautions, counseling and education provided, patient verbalized understanding

## 2022-11-07 ENCOUNTER — LAB VISIT (OUTPATIENT)
Dept: LAB | Facility: HOSPITAL | Age: 67
End: 2022-11-07
Attending: NURSE PRACTITIONER
Payer: MEDICARE

## 2022-11-07 DIAGNOSIS — Z79.4 TYPE 2 DIABETES MELLITUS WITH BOTH EYES AFFECTED BY RETINOPATHY WITHOUT MACULAR EDEMA, WITH LONG-TERM CURRENT USE OF INSULIN, UNSPECIFIED RETINOPATHY SEVERITY: ICD-10-CM

## 2022-11-07 DIAGNOSIS — E11.319 TYPE 2 DIABETES MELLITUS WITH BOTH EYES AFFECTED BY RETINOPATHY WITHOUT MACULAR EDEMA, WITH LONG-TERM CURRENT USE OF INSULIN, UNSPECIFIED RETINOPATHY SEVERITY: ICD-10-CM

## 2022-11-07 DIAGNOSIS — D64.9 ANEMIA, UNSPECIFIED TYPE: ICD-10-CM

## 2022-11-07 LAB
ANION GAP SERPL CALC-SCNC: 12 MMOL/L (ref 8–16)
BASOPHILS # BLD AUTO: 0.06 K/UL (ref 0–0.2)
BASOPHILS NFR BLD: 0.9 % (ref 0–1.9)
BUN SERPL-MCNC: 20 MG/DL (ref 8–23)
CALCIUM SERPL-MCNC: 10.3 MG/DL (ref 8.7–10.5)
CHLORIDE SERPL-SCNC: 99 MMOL/L (ref 95–110)
CO2 SERPL-SCNC: 27 MMOL/L (ref 23–29)
CREAT SERPL-MCNC: 1.3 MG/DL (ref 0.5–1.4)
DIFFERENTIAL METHOD: NORMAL
EOSINOPHIL # BLD AUTO: 0.3 K/UL (ref 0–0.5)
EOSINOPHIL NFR BLD: 4.8 % (ref 0–8)
ERYTHROCYTE [DISTWIDTH] IN BLOOD BY AUTOMATED COUNT: 13.7 % (ref 11.5–14.5)
EST. GFR  (NO RACE VARIABLE): 44.5 ML/MIN/1.73 M^2
ESTIMATED AVG GLUCOSE: 235 MG/DL (ref 68–131)
GLUCOSE SERPL-MCNC: 166 MG/DL (ref 70–110)
HBA1C MFR BLD: 9.8 % (ref 4–5.6)
HCT VFR BLD AUTO: 39.4 % (ref 37–48.5)
HGB BLD-MCNC: 12.8 G/DL (ref 12–16)
IMM GRANULOCYTES # BLD AUTO: 0.02 K/UL (ref 0–0.04)
IMM GRANULOCYTES NFR BLD AUTO: 0.3 % (ref 0–0.5)
LYMPHOCYTES # BLD AUTO: 2.8 K/UL (ref 1–4.8)
LYMPHOCYTES NFR BLD: 39.9 % (ref 18–48)
MCH RBC QN AUTO: 27.1 PG (ref 27–31)
MCHC RBC AUTO-ENTMCNC: 32.5 G/DL (ref 32–36)
MCV RBC AUTO: 84 FL (ref 82–98)
MONOCYTES # BLD AUTO: 0.4 K/UL (ref 0.3–1)
MONOCYTES NFR BLD: 6.4 % (ref 4–15)
NEUTROPHILS # BLD AUTO: 3.3 K/UL (ref 1.8–7.7)
NEUTROPHILS NFR BLD: 47.7 % (ref 38–73)
NRBC BLD-RTO: 0 /100 WBC
PLATELET # BLD AUTO: 304 K/UL (ref 150–450)
PMV BLD AUTO: 10.6 FL (ref 9.2–12.9)
POTASSIUM SERPL-SCNC: 4.2 MMOL/L (ref 3.5–5.1)
RBC # BLD AUTO: 4.72 M/UL (ref 4–5.4)
SODIUM SERPL-SCNC: 138 MMOL/L (ref 136–145)
WBC # BLD AUTO: 6.89 K/UL (ref 3.9–12.7)

## 2022-11-07 PROCEDURE — 83036 HEMOGLOBIN GLYCOSYLATED A1C: CPT | Performed by: NURSE PRACTITIONER

## 2022-11-07 PROCEDURE — 36415 COLL VENOUS BLD VENIPUNCTURE: CPT | Mod: PO | Performed by: NURSE PRACTITIONER

## 2022-11-07 PROCEDURE — 80048 BASIC METABOLIC PNL TOTAL CA: CPT | Performed by: NURSE PRACTITIONER

## 2022-11-07 PROCEDURE — 85025 COMPLETE CBC W/AUTO DIFF WBC: CPT | Performed by: PHYSICIAN ASSISTANT

## 2022-11-08 ENCOUNTER — PATIENT OUTREACH (OUTPATIENT)
Dept: ADMINISTRATIVE | Facility: HOSPITAL | Age: 67
End: 2022-11-08
Payer: MEDICARE

## 2022-11-10 ENCOUNTER — OFFICE VISIT (OUTPATIENT)
Dept: FAMILY MEDICINE | Facility: CLINIC | Age: 67
End: 2022-11-10
Payer: MEDICARE

## 2022-11-10 VITALS
OXYGEN SATURATION: 97 % | DIASTOLIC BLOOD PRESSURE: 82 MMHG | WEIGHT: 169.06 LBS | SYSTOLIC BLOOD PRESSURE: 122 MMHG | BODY MASS INDEX: 30.93 KG/M2 | HEART RATE: 73 BPM

## 2022-11-10 DIAGNOSIS — E11.65 UNCONTROLLED TYPE 2 DIABETES MELLITUS WITH HYPERGLYCEMIA: Primary | ICD-10-CM

## 2022-11-10 DIAGNOSIS — Z12.11 COLON CANCER SCREENING: ICD-10-CM

## 2022-11-10 PROCEDURE — 3074F SYST BP LT 130 MM HG: CPT | Mod: CPTII,S$GLB,, | Performed by: STUDENT IN AN ORGANIZED HEALTH CARE EDUCATION/TRAINING PROGRAM

## 2022-11-10 PROCEDURE — 1126F PR PAIN SEVERITY QUANTIFIED, NO PAIN PRESENT: ICD-10-PCS | Mod: CPTII,S$GLB,, | Performed by: STUDENT IN AN ORGANIZED HEALTH CARE EDUCATION/TRAINING PROGRAM

## 2022-11-10 PROCEDURE — 1159F PR MEDICATION LIST DOCUMENTED IN MEDICAL RECORD: ICD-10-PCS | Mod: CPTII,S$GLB,, | Performed by: STUDENT IN AN ORGANIZED HEALTH CARE EDUCATION/TRAINING PROGRAM

## 2022-11-10 PROCEDURE — 99999 PR PBB SHADOW E&M-EST. PATIENT-LVL III: ICD-10-PCS | Mod: PBBFAC,,, | Performed by: STUDENT IN AN ORGANIZED HEALTH CARE EDUCATION/TRAINING PROGRAM

## 2022-11-10 PROCEDURE — 99999 PR PBB SHADOW E&M-EST. PATIENT-LVL III: CPT | Mod: PBBFAC,,, | Performed by: STUDENT IN AN ORGANIZED HEALTH CARE EDUCATION/TRAINING PROGRAM

## 2022-11-10 PROCEDURE — 99213 PR OFFICE/OUTPT VISIT, EST, LEVL III, 20-29 MIN: ICD-10-PCS | Mod: S$GLB,,, | Performed by: STUDENT IN AN ORGANIZED HEALTH CARE EDUCATION/TRAINING PROGRAM

## 2022-11-10 PROCEDURE — 1160F PR REVIEW ALL MEDS BY PRESCRIBER/CLIN PHARMACIST DOCUMENTED: ICD-10-PCS | Mod: CPTII,S$GLB,, | Performed by: STUDENT IN AN ORGANIZED HEALTH CARE EDUCATION/TRAINING PROGRAM

## 2022-11-10 PROCEDURE — 3046F PR MOST RECENT HEMOGLOBIN A1C LEVEL > 9.0%: ICD-10-PCS | Mod: CPTII,S$GLB,, | Performed by: STUDENT IN AN ORGANIZED HEALTH CARE EDUCATION/TRAINING PROGRAM

## 2022-11-10 PROCEDURE — 3060F PR POS MICROALBUMINURIA RESULT DOCUMENTED/REVIEW: ICD-10-PCS | Mod: CPTII,S$GLB,, | Performed by: STUDENT IN AN ORGANIZED HEALTH CARE EDUCATION/TRAINING PROGRAM

## 2022-11-10 PROCEDURE — 3046F HEMOGLOBIN A1C LEVEL >9.0%: CPT | Mod: CPTII,S$GLB,, | Performed by: STUDENT IN AN ORGANIZED HEALTH CARE EDUCATION/TRAINING PROGRAM

## 2022-11-10 PROCEDURE — 3074F PR MOST RECENT SYSTOLIC BLOOD PRESSURE < 130 MM HG: ICD-10-PCS | Mod: CPTII,S$GLB,, | Performed by: STUDENT IN AN ORGANIZED HEALTH CARE EDUCATION/TRAINING PROGRAM

## 2022-11-10 PROCEDURE — 1126F AMNT PAIN NOTED NONE PRSNT: CPT | Mod: CPTII,S$GLB,, | Performed by: STUDENT IN AN ORGANIZED HEALTH CARE EDUCATION/TRAINING PROGRAM

## 2022-11-10 PROCEDURE — 1159F MED LIST DOCD IN RCRD: CPT | Mod: CPTII,S$GLB,, | Performed by: STUDENT IN AN ORGANIZED HEALTH CARE EDUCATION/TRAINING PROGRAM

## 2022-11-10 PROCEDURE — 3079F DIAST BP 80-89 MM HG: CPT | Mod: CPTII,S$GLB,, | Performed by: STUDENT IN AN ORGANIZED HEALTH CARE EDUCATION/TRAINING PROGRAM

## 2022-11-10 PROCEDURE — 99213 OFFICE O/P EST LOW 20 MIN: CPT | Mod: S$GLB,,, | Performed by: STUDENT IN AN ORGANIZED HEALTH CARE EDUCATION/TRAINING PROGRAM

## 2022-11-10 PROCEDURE — 3079F PR MOST RECENT DIASTOLIC BLOOD PRESSURE 80-89 MM HG: ICD-10-PCS | Mod: CPTII,S$GLB,, | Performed by: STUDENT IN AN ORGANIZED HEALTH CARE EDUCATION/TRAINING PROGRAM

## 2022-11-10 PROCEDURE — 3008F PR BODY MASS INDEX (BMI) DOCUMENTED: ICD-10-PCS | Mod: CPTII,S$GLB,, | Performed by: STUDENT IN AN ORGANIZED HEALTH CARE EDUCATION/TRAINING PROGRAM

## 2022-11-10 PROCEDURE — 3008F BODY MASS INDEX DOCD: CPT | Mod: CPTII,S$GLB,, | Performed by: STUDENT IN AN ORGANIZED HEALTH CARE EDUCATION/TRAINING PROGRAM

## 2022-11-10 PROCEDURE — 3060F POS MICROALBUMINURIA REV: CPT | Mod: CPTII,S$GLB,, | Performed by: STUDENT IN AN ORGANIZED HEALTH CARE EDUCATION/TRAINING PROGRAM

## 2022-11-10 PROCEDURE — 3066F NEPHROPATHY DOC TX: CPT | Mod: CPTII,S$GLB,, | Performed by: STUDENT IN AN ORGANIZED HEALTH CARE EDUCATION/TRAINING PROGRAM

## 2022-11-10 PROCEDURE — 3066F PR DOCUMENTATION OF TREATMENT FOR NEPHROPATHY: ICD-10-PCS | Mod: CPTII,S$GLB,, | Performed by: STUDENT IN AN ORGANIZED HEALTH CARE EDUCATION/TRAINING PROGRAM

## 2022-11-10 PROCEDURE — 1160F RVW MEDS BY RX/DR IN RCRD: CPT | Mod: CPTII,S$GLB,, | Performed by: STUDENT IN AN ORGANIZED HEALTH CARE EDUCATION/TRAINING PROGRAM

## 2022-11-10 RX ORDER — INSULIN GLARGINE 100 [IU]/ML
40 INJECTION, SOLUTION SUBCUTANEOUS NIGHTLY
Qty: 30 ML | Refills: 11 | Status: SHIPPED | OUTPATIENT
Start: 2022-11-10 | End: 2023-03-09 | Stop reason: SDUPTHER

## 2022-11-10 NOTE — PROGRESS NOTES
Subjective:       Patient ID: Jeanine Chandler is a 67 y.o. female.    Chief Complaint: Follow-up    Seen in clinic 1 week ago, Presented for hospital discharge follow up for TIA, no focal deficits, headache resolved, discharged 10/24  Was having hyperglycemia in setting of medication nonadherence, restarted lantus increased from 34 to 38 then 40 units, now reports fasting AM Glu improved   reports compliant with medications, denies current hyper- or hypoglycemia symptoms, aware of diabetic principles, not following appropriate diet, is taking SSI    Follow-up  Pertinent negatives include no abdominal pain, chest pain, diaphoresis, fever or headaches.     Review of Systems   Constitutional:  Negative for diaphoresis, fever, malaise/fatigue and weight loss.   Eyes:  Negative for blurred vision.   Respiratory:  Negative for shortness of breath.    Cardiovascular:  Negative for chest pain and palpitations.        Denies dyspnea on exertion   Gastrointestinal:  Negative for abdominal pain and heartburn.   Genitourinary:  Negative for frequency and urgency.   Neurological:  Negative for dizziness, tingling and headaches.        Denies neuropathic pain, Denies focal neurological changes   Endo/Heme/Allergies:  Negative for polydipsia.        Denies hot or cold intolerance, Denies other hyper- or hypo-glycemia symptoms      Objective:      Vitals:    11/10/22 1041   BP: 122/82   Pulse: 73   SpO2: 97%   Weight: 76.7 kg (169 lb 1.5 oz)      Physical Exam  Constitutional:       General: She is not in acute distress.  Eyes:      General: No scleral icterus.     Conjunctiva/sclera: Conjunctivae normal.   Cardiovascular:      Rate and Rhythm: Normal rate and regular rhythm.      Comments: Good skin turgor, No edema  Pulmonary:      Comments: Respirations symmetric and not labored  Musculoskeletal:         General: Normal range of motion.      Cervical back: Neck supple.      Comments: Normal strength   Lymphadenopathy:       Cervical: No cervical adenopathy.   Skin:     General: Skin is warm and dry.      Findings: No lesion or rash.   Neurological:      General: No focal deficit present.      Mental Status: She is alert and oriented to person, place, and time.   Psychiatric:         Mood and Affect: Mood normal.      Comments: Cooperative, Appropriate affect        Assessment:       1. Uncontrolled type 2 diabetes mellitus with hyperglycemia          Plan:       Uncontrolled type 2 diabetes mellitus with hyperglycemia  -     insulin (LANTUS SOLOSTAR U-100 INSULIN) glargine 100 units/mL SubQ pen; Inject 40 Units into the skin every evening.  Dispense: 30 mL; Refill: 11  Improved  Will continue lantus 40 units, will decrease sliding scale with dinner, monitor for resolution of hypoglycemic epsidoes   continue TID glucose monitoring with dexcom, otherwise continue current regimen, metformin, ozempic, on ARB, on LLT  ED/medication/hyper/hypoglycemia precautions, counseling provided

## 2022-11-14 ENCOUNTER — OFFICE VISIT (OUTPATIENT)
Dept: ENDOCRINOLOGY | Facility: CLINIC | Age: 67
End: 2022-11-14
Payer: MEDICARE

## 2022-11-14 VITALS
WEIGHT: 164.56 LBS | BODY MASS INDEX: 30.28 KG/M2 | DIASTOLIC BLOOD PRESSURE: 70 MMHG | HEIGHT: 62 IN | HEART RATE: 64 BPM | SYSTOLIC BLOOD PRESSURE: 124 MMHG

## 2022-11-14 DIAGNOSIS — E11.319 TYPE 2 DIABETES MELLITUS WITH BOTH EYES AFFECTED BY RETINOPATHY WITHOUT MACULAR EDEMA, WITH LONG-TERM CURRENT USE OF INSULIN, UNSPECIFIED RETINOPATHY SEVERITY: Primary | ICD-10-CM

## 2022-11-14 DIAGNOSIS — E78.2 MIXED HYPERLIPIDEMIA: ICD-10-CM

## 2022-11-14 DIAGNOSIS — I10 ESSENTIAL HYPERTENSION: ICD-10-CM

## 2022-11-14 DIAGNOSIS — E11.649 HYPOGLYCEMIA UNAWARENESS ASSOCIATED WITH TYPE 2 DIABETES MELLITUS: ICD-10-CM

## 2022-11-14 DIAGNOSIS — Z79.4 TYPE 2 DIABETES MELLITUS WITH BOTH EYES AFFECTED BY RETINOPATHY WITHOUT MACULAR EDEMA, WITH LONG-TERM CURRENT USE OF INSULIN, UNSPECIFIED RETINOPATHY SEVERITY: Primary | ICD-10-CM

## 2022-11-14 PROCEDURE — 3078F PR MOST RECENT DIASTOLIC BLOOD PRESSURE < 80 MM HG: ICD-10-PCS | Mod: CPTII,S$GLB,, | Performed by: NURSE PRACTITIONER

## 2022-11-14 PROCEDURE — 3288F FALL RISK ASSESSMENT DOCD: CPT | Mod: CPTII,S$GLB,, | Performed by: NURSE PRACTITIONER

## 2022-11-14 PROCEDURE — 95251 PR GLUCOSE MONITOR, 72 HOUR, PHYS INTERP: ICD-10-PCS | Mod: S$GLB,,, | Performed by: NURSE PRACTITIONER

## 2022-11-14 PROCEDURE — 99214 PR OFFICE/OUTPT VISIT, EST, LEVL IV, 30-39 MIN: ICD-10-PCS | Mod: S$GLB,,, | Performed by: NURSE PRACTITIONER

## 2022-11-14 PROCEDURE — 3060F POS MICROALBUMINURIA REV: CPT | Mod: CPTII,S$GLB,, | Performed by: NURSE PRACTITIONER

## 2022-11-14 PROCEDURE — 3008F BODY MASS INDEX DOCD: CPT | Mod: CPTII,S$GLB,, | Performed by: NURSE PRACTITIONER

## 2022-11-14 PROCEDURE — 3074F PR MOST RECENT SYSTOLIC BLOOD PRESSURE < 130 MM HG: ICD-10-PCS | Mod: CPTII,S$GLB,, | Performed by: NURSE PRACTITIONER

## 2022-11-14 PROCEDURE — 1160F RVW MEDS BY RX/DR IN RCRD: CPT | Mod: CPTII,S$GLB,, | Performed by: NURSE PRACTITIONER

## 2022-11-14 PROCEDURE — 3046F PR MOST RECENT HEMOGLOBIN A1C LEVEL > 9.0%: ICD-10-PCS | Mod: CPTII,S$GLB,, | Performed by: NURSE PRACTITIONER

## 2022-11-14 PROCEDURE — 1126F AMNT PAIN NOTED NONE PRSNT: CPT | Mod: CPTII,S$GLB,, | Performed by: NURSE PRACTITIONER

## 2022-11-14 PROCEDURE — 3046F HEMOGLOBIN A1C LEVEL >9.0%: CPT | Mod: CPTII,S$GLB,, | Performed by: NURSE PRACTITIONER

## 2022-11-14 PROCEDURE — 3060F PR POS MICROALBUMINURIA RESULT DOCUMENTED/REVIEW: ICD-10-PCS | Mod: CPTII,S$GLB,, | Performed by: NURSE PRACTITIONER

## 2022-11-14 PROCEDURE — 99999 PR PBB SHADOW E&M-EST. PATIENT-LVL V: ICD-10-PCS | Mod: PBBFAC,,, | Performed by: NURSE PRACTITIONER

## 2022-11-14 PROCEDURE — 99214 OFFICE O/P EST MOD 30 MIN: CPT | Mod: S$GLB,,, | Performed by: NURSE PRACTITIONER

## 2022-11-14 PROCEDURE — 1100F PR PT FALLS ASSESS DOC 2+ FALLS/FALL W/INJURY/YR: ICD-10-PCS | Mod: CPTII,S$GLB,, | Performed by: NURSE PRACTITIONER

## 2022-11-14 PROCEDURE — 1159F MED LIST DOCD IN RCRD: CPT | Mod: CPTII,S$GLB,, | Performed by: NURSE PRACTITIONER

## 2022-11-14 PROCEDURE — 3288F PR FALLS RISK ASSESSMENT DOCUMENTED: ICD-10-PCS | Mod: CPTII,S$GLB,, | Performed by: NURSE PRACTITIONER

## 2022-11-14 PROCEDURE — 1100F PTFALLS ASSESS-DOCD GE2>/YR: CPT | Mod: CPTII,S$GLB,, | Performed by: NURSE PRACTITIONER

## 2022-11-14 PROCEDURE — 3074F SYST BP LT 130 MM HG: CPT | Mod: CPTII,S$GLB,, | Performed by: NURSE PRACTITIONER

## 2022-11-14 PROCEDURE — 3066F PR DOCUMENTATION OF TREATMENT FOR NEPHROPATHY: ICD-10-PCS | Mod: CPTII,S$GLB,, | Performed by: NURSE PRACTITIONER

## 2022-11-14 PROCEDURE — 1160F PR REVIEW ALL MEDS BY PRESCRIBER/CLIN PHARMACIST DOCUMENTED: ICD-10-PCS | Mod: CPTII,S$GLB,, | Performed by: NURSE PRACTITIONER

## 2022-11-14 PROCEDURE — 3008F PR BODY MASS INDEX (BMI) DOCUMENTED: ICD-10-PCS | Mod: CPTII,S$GLB,, | Performed by: NURSE PRACTITIONER

## 2022-11-14 PROCEDURE — 1159F PR MEDICATION LIST DOCUMENTED IN MEDICAL RECORD: ICD-10-PCS | Mod: CPTII,S$GLB,, | Performed by: NURSE PRACTITIONER

## 2022-11-14 PROCEDURE — 95251 CONT GLUC MNTR ANALYSIS I&R: CPT | Mod: S$GLB,,, | Performed by: NURSE PRACTITIONER

## 2022-11-14 PROCEDURE — 3066F NEPHROPATHY DOC TX: CPT | Mod: CPTII,S$GLB,, | Performed by: NURSE PRACTITIONER

## 2022-11-14 PROCEDURE — 3078F DIAST BP <80 MM HG: CPT | Mod: CPTII,S$GLB,, | Performed by: NURSE PRACTITIONER

## 2022-11-14 PROCEDURE — 1126F PR PAIN SEVERITY QUANTIFIED, NO PAIN PRESENT: ICD-10-PCS | Mod: CPTII,S$GLB,, | Performed by: NURSE PRACTITIONER

## 2022-11-14 PROCEDURE — 99999 PR PBB SHADOW E&M-EST. PATIENT-LVL V: CPT | Mod: PBBFAC,,, | Performed by: NURSE PRACTITIONER

## 2022-11-14 RX ORDER — INSULIN ASPART 100 [IU]/ML
14 INJECTION, SOLUTION INTRAVENOUS; SUBCUTANEOUS
Qty: 45 ML | Refills: 0
Start: 2022-11-14 | End: 2023-03-03

## 2022-11-14 NOTE — PATIENT INSTRUCTIONS
Increase Novolog to 14 units with meals + sliding scale. If having small meal or a carb-containing snack, take 7 units.     Continue Lantus 40 units nightly.

## 2022-11-14 NOTE — PROGRESS NOTES
"CC: Ms. Jeanine Chandler arrives today for management of Type 2 DM and review of chronic medical conditions, as listed in the Visit Diagnosis section of this encounter.       HPI: Ms. Jeanine Chandler was diagnosed with Type 2 DM in her late 40s. She was diagnosed based on lab work. Initial treatment consisted of metformin and insulin was added a few years later. + FH of DM in mother and sister. Denies hospitalizations due to DM.     Patient was last seen by me in August. At this time, insulin doses and Ozempic dose were increased.     She feels that she has been more compliant with her insulin since admission for recent TIA. She states that she had not been forgetting to take insulin prior to TIA, she simply didn't want to. Her behaviors have since changed over the past few weeks. Her  is trying to help.     Following with Dr. Galindo and Dr. Wright.     BG monitoring per Dexcom G6.     Hypoglycemia: Rare  Symptoms: sweating  Treatment: juice     Missing Insulin/PO medication doses: No    Exercise: No    Dietary Habits: Eats 1-2 meals/day. Snacks occasionally. Avoids sugary beverages.     Last DM education appointment: 4/9/2019      CURRENT DIABETIC MEDS: metformin XR 1000 mg BID, Ozempic 1 mg weekly, Lantus 40 units QHS, Novolog 10 units with meals + correction scale, target 150, ISF 50  Vial or pen: pen  Glucometer type: unsure    Previous DM treatments:  Januvia  Metformin - diarrhea    Last Eye Exam: 10/2022. +    Last Podiatry Exam: n/a    REVIEW OF SYSTEMS  Constitutional: no c/o weakness. + weight loss.   Cardiac: no palpitations or chest pain.  Respiratory: no dyspnea, cough  GI: no c/o abdominal pain or nausea. Denies h/o pancreatitis.   Skin: no lesions or rashes.   Neuro: no numbness, tingling, or parasthesias.  Endocrine: denies polyphagia, polyuria, polydipsia       Personally reviewed Past Medical, Surgical, Social History.    Vital Signs  /70   Pulse 64   Ht 5' 2" (1.575 m)  "  Wt 74.6 kg (164 lb 9.2 oz)   BMI 30.10 kg/m²      Personally reviewed the below labs:    Hemoglobin A1C   Date Value Ref Range Status   11/07/2022 9.8 (H) 4.0 - 5.6 % Final     Comment:     ADA Screening Guidelines:  5.7-6.4%  Consistent with prediabetes  >or=6.5%  Consistent with diabetes    High levels of fetal hemoglobin interfere with the HbA1C  assay. Heterozygous hemoglobin variants (HbS, HgC, etc)do  not significantly interfere with this assay.   However, presence of multiple variants may affect accuracy.     10/24/2022 10.4 (H) 0.0 - 5.6 % Final     Comment:     Reference Interval:  5.0 - 5.6 Normal   5.7 - 6.4 High Risk   > 6.5 Diabetic      Hgb A1c results are standardized based on the (NGSP) National   Glycohemoglobin Standardization Program.      Hemoglobin A1C levels are related to mean serum/plasma glucose   during the preceding 2-3 months.        08/05/2022 12.4 (H) 4.0 - 5.6 % Final     Comment:     ADA Screening Guidelines:  5.7-6.4%  Consistent with prediabetes  >or=6.5%  Consistent with diabetes    High levels of fetal hemoglobin interfere with the HbA1C  assay. Heterozygous hemoglobin variants (HbS, HgC, etc)do  not significantly interfere with this assay.   However, presence of multiple variants may affect accuracy.         Chemistry        Component Value Date/Time     11/07/2022 1341    K 4.2 11/07/2022 1341    CL 99 11/07/2022 1341    CO2 27 11/07/2022 1341    BUN 20 11/07/2022 1341    CREATININE 1.3 11/07/2022 1341     (H) 11/07/2022 1341        Component Value Date/Time    CALCIUM 10.3 11/07/2022 1341    ALKPHOS 70 10/25/2022 0604    AST 24 10/25/2022 0604    ALT 12 10/25/2022 0604    BILITOT 0.8 10/25/2022 0604    ESTGFRAFRICA 37 (L) 09/14/2020 1038    EGFRNONAA 32 (L) 09/14/2020 1038          Lab Results   Component Value Date    CHOL 249 (H) 10/24/2022    CHOL 204 (H) 04/25/2022    CHOL 117 09/14/2020     Lab Results   Component Value Date    HDL 35 (L) 10/24/2022    HDL  42 04/25/2022    HDL 36 (L) 09/14/2020     Lab Results   Component Value Date    LDLCALC 167.2 (H) 10/24/2022    LDLCALC 112.6 04/25/2022    LDLCALC 53 09/14/2020     Lab Results   Component Value Date    TRIG 234 (H) 10/24/2022    TRIG 247 (H) 04/25/2022    TRIG 223 (H) 09/14/2020     Lab Results   Component Value Date    CHOLHDL 14.1 (L) 10/24/2022    CHOLHDL 20.6 04/25/2022    CHOLHDL 3.3 09/14/2020       Lab Results   Component Value Date    MICALBCREAT 31.7 (H) 04/25/2022     Lab Results   Component Value Date    TSH 3.770 10/24/2022       CrCl cannot be calculated (Unknown ideal weight.).    No results found for: [id number]      PHYSICAL EXAMINATION  Constitutional: Appears well, no distress  Neck: Supple, trachea midline.  Respiratory: CTA, even and unlabored.  Cardiovascular: RRR, no murmurs.  GI: active bowel sounds, no hernia noted.  Skin: warm and dry; no visible wounds  Neuro: oriented to person, place, time  Feet: appropriate footwear.      DEXCOM DOWNLOAD: See media file for download. Fasting glucoses vary. Two recent episodes of early morning borderline/mild hypoglycemia. However, over the weekend, fasting glucoses are suddenly much higher. Large prandial excursions noted, usually first occurring in afternoon.   Average glucose: 241 mg/dL  Above 250 mg/dL: 43 %  181-250 mg/dL: 28 %   mg/dL: 28 %  54-69 mg/dL: <1 %  Below 54 mg/dL: 0 %        A1c target < 7%      Assessment/Plan  1. Type 2 diabetes mellitus with retinopathy of both eyes, with long-term current use of insulin, macular edema presence unspecified, unspecified retinopathy severity  -- Uncontrolled with large excursions. Patient denies missing insulin doses in the past few weeks.  -- Increase Novolog to 14 units with meals + sliding scale. May use half dose for small meals/snack.  -- Continue Lantus 40 units nightly  -- continue Ozempic, metformin XR  -- BG monitoring per Dexcom G6. Notify me if BG remains > 200 after meals.      --  Discussed diagnosis of DM, A1c goals, progression of disease, long term complications and tx options.    -- Reviewed hypoglycemia management: treat with 1/2 glass of juice, 1/2 can regular coke, or 4 glucose tablets. Monitor and repeat treatment every 15 minutes until BG is >70 Then have a snack, which includes a complex carbohydrate and protein.   Advised patient to check BG before activities, such as driving or exercise.    -- takes statin, ACE-I, Xarelto     2. Essential hypertension  -- controlled  -- continue Hyzaar   3. Hyperlipidemia, unspecified hyperlipidemia type  -- uncontrolled  -- now  taking atorvastatin more regularly   -- optimize DM control  -- lipid panel with RTC   4. 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   Patient instructed to bring BG logs to each follow up   Patient encouraged to call for any BG/medication issues, concerns, or questions.      Orders Placed This Encounter   Procedures    Hemoglobin A1C    Comprehensive Metabolic Panel    Lipid Panel

## 2022-12-05 ENCOUNTER — IMMUNIZATION (OUTPATIENT)
Dept: FAMILY MEDICINE | Facility: CLINIC | Age: 67
End: 2022-12-05
Payer: MEDICARE

## 2022-12-05 DIAGNOSIS — Z23 NEED FOR VACCINATION: Primary | ICD-10-CM

## 2022-12-05 PROCEDURE — 0124A COVID-19, MRNA, LNP-S, BIVALENT BOOSTER, PF, 30 MCG/0.3 ML DOSE: CPT | Mod: PBBFAC | Performed by: FAMILY MEDICINE

## 2022-12-05 PROCEDURE — 91312 COVID-19, MRNA, LNP-S, BIVALENT BOOSTER, PF, 30 MCG/0.3 ML DOSE: CPT | Mod: S$GLB,,, | Performed by: FAMILY MEDICINE

## 2022-12-05 PROCEDURE — 91312 COVID-19, MRNA, LNP-S, BIVALENT BOOSTER, PF, 30 MCG/0.3 ML DOSE: ICD-10-PCS | Mod: S$GLB,,, | Performed by: FAMILY MEDICINE

## 2022-12-09 NOTE — TELEPHONE ENCOUNTER
Care Due:                  Date            Visit Type   Department     Provider  --------------------------------------------------------------------------------                                EP -                              PRIMARY      [facility] FAM[facility]  Last Visit: 06-      CARE ([facility])   MEDICINE       [facility]GL[facility]  Next Visit: None Scheduled  None         None Found                                                            Last  Test          Frequency    Reason                     Performed    Due Date  --------------------------------------------------------------------------------    Office Visit  12 months..  EScitalopram,              06- 05-                             atorvastatin,                             losartan-hydrochlorothiaz                             ida......................    [employer] Embedded Care Gaps. Reference number: [id number]. 8/18/2022   12:01:03 PM CDT  
Duplicate  
Orthopedic

## 2022-12-15 ENCOUNTER — PATIENT MESSAGE (OUTPATIENT)
Dept: GASTROENTEROLOGY | Facility: CLINIC | Age: 67
End: 2022-12-15
Payer: OTHER GOVERNMENT

## 2022-12-20 ENCOUNTER — OFFICE VISIT (OUTPATIENT)
Dept: CARDIOLOGY | Facility: CLINIC | Age: 67
End: 2022-12-20
Payer: MEDICARE

## 2022-12-20 VITALS
WEIGHT: 173.94 LBS | SYSTOLIC BLOOD PRESSURE: 160 MMHG | HEIGHT: 62 IN | DIASTOLIC BLOOD PRESSURE: 96 MMHG | HEART RATE: 106 BPM | BODY MASS INDEX: 32.01 KG/M2

## 2022-12-20 DIAGNOSIS — E11.59 HYPERTENSION ASSOCIATED WITH DIABETES: ICD-10-CM

## 2022-12-20 DIAGNOSIS — E78.2 MIXED HYPERLIPIDEMIA: ICD-10-CM

## 2022-12-20 DIAGNOSIS — I48.0 PAROXYSMAL ATRIAL FIBRILLATION: Primary | ICD-10-CM

## 2022-12-20 DIAGNOSIS — I15.2 HYPERTENSION ASSOCIATED WITH DIABETES: ICD-10-CM

## 2022-12-20 PROCEDURE — 93010 EKG 12-LEAD: ICD-10-PCS | Mod: S$GLB,,, | Performed by: INTERNAL MEDICINE

## 2022-12-20 PROCEDURE — 99999 PR PBB SHADOW E&M-EST. PATIENT-LVL III: ICD-10-PCS | Mod: PBBFAC,,, | Performed by: INTERNAL MEDICINE

## 2022-12-20 PROCEDURE — 93010 ELECTROCARDIOGRAM REPORT: CPT | Mod: S$GLB,,, | Performed by: INTERNAL MEDICINE

## 2022-12-20 PROCEDURE — 99214 PR OFFICE/OUTPT VISIT, EST, LEVL IV, 30-39 MIN: ICD-10-PCS | Mod: S$GLB,,, | Performed by: INTERNAL MEDICINE

## 2022-12-20 PROCEDURE — 93005 ELECTROCARDIOGRAM TRACING: CPT | Mod: PO

## 2022-12-20 PROCEDURE — 99999 PR PBB SHADOW E&M-EST. PATIENT-LVL III: CPT | Mod: PBBFAC,,, | Performed by: INTERNAL MEDICINE

## 2022-12-20 PROCEDURE — 99213 OFFICE O/P EST LOW 20 MIN: CPT | Mod: PBBFAC,PO | Performed by: INTERNAL MEDICINE

## 2022-12-20 PROCEDURE — 99214 OFFICE O/P EST MOD 30 MIN: CPT | Mod: S$GLB,,, | Performed by: INTERNAL MEDICINE

## 2022-12-20 RX ORDER — METOPROLOL SUCCINATE 50 MG/1
150 TABLET, EXTENDED RELEASE ORAL DAILY
Qty: 270 TABLET | Refills: 3 | Status: SHIPPED | OUTPATIENT
Start: 2022-12-20 | End: 2022-12-20

## 2022-12-20 RX ORDER — METOPROLOL SUCCINATE 100 MG/1
100 TABLET, EXTENDED RELEASE ORAL DAILY
Qty: 90 TABLET | Refills: 3 | Status: SHIPPED | OUTPATIENT
Start: 2022-12-20 | End: 2023-12-20

## 2022-12-20 NOTE — PROGRESS NOTES
"Subjective:    Patient ID:  Jeanine Chandler is a 67 y.o. female who presents for follow-up of Hyperlipidemia and Atrial Fibrillation      Problem List Items Addressed This Visit          Cardiac/Vascular    Hypertension associated with diabetes    Mixed hyperlipidemia    Paroxysmal atrial fibrillation - Primary       HPI    Patient was last seen on 01/06/2022 at which time she was doing okay from a cardiac standpoint.  Walking program was recommended.    Since she was last seen, patient presented to [facility] or TIA like symptoms.  Also had AFib with RVR that was rate controlled.  Found to have moderate carotid stenosis for which aspirin was added at the recommendation of Vascular surgery.    On assessment today, the patient states that she feels OK.    No chest pain.  No shortness of breath.  No palpitations       Objective:     Vitals:    12/20/22 1102   BP: (!) 160/96   BP Location: Left arm   Patient Position: Sitting   BP Method: Medium (Automatic)   Pulse: 106   Weight: 78.9 kg (173 lb 15.1 oz)   Height: 5' 2" (1.575 m)        Physical Exam  Vitals and nursing note reviewed.   Constitutional:       General: She is not in acute distress.     Appearance: She is well-developed.   HENT:      Head: Normocephalic and atraumatic.   Neck:      Vascular: No JVD.   Cardiovascular:      Rate and Rhythm: Normal rate and regular rhythm.      Heart sounds: Normal heart sounds. No murmur heard.    No friction rub. No gallop.   Pulmonary:      Effort: Pulmonary effort is normal. No respiratory distress.      Breath sounds: Normal breath sounds. No wheezing or rales.   Abdominal:      General: Bowel sounds are normal.      Palpations: Abdomen is soft.      Tenderness: There is no abdominal tenderness. There is no guarding or rebound.   Musculoskeletal:         General: No tenderness.      Cervical back: Neck supple.   Skin:     General: Skin is warm and dry.   Neurological:      Mental Status: She is alert " and oriented to person, place, and time.   Psychiatric:         Behavior: Behavior normal.           Current Outpatient Medications   Medication Instructions    aspirin (ECOTRIN) 81 mg, Oral, Daily    atorvastatin (LIPITOR) 40 MG tablet TAKE 1 TABLET BY MOUTH EVERY DAY    blood sugar diagnostic Strp To check BG 4 times daily, to use with insurance preferred meter<BR>Please provide a meter and supplies that their insurance will cover. Thank You.  E11.65    blood-glucose meter kit To check BG 4 times daily, to use with insurance preferred meter<BR>Please provide a meter and supplies that their insurance will cover. Thank You.  E11.65    blood-glucose sensor (DEXCOM G6 SENSOR) Lindsay Use for continuous blood sugar monitoring. Change every 10 days. Dx code E11.65    blood-glucose transmitter (DEXCOM G6 TRANSMITTER) Lindsay For continuous blood glucose monitoring. Change device every 90 days. Dx code E11.65    clotrimazole-betamethasone 1-0.05% (LOTRISONE) cream Topical (Top), 2 times daily    DEXCOM G6  Misc USE FOR CONTINUOUS BLOOD SUGAR MONITORING    EPINEPHrine (EPIPEN) 0.3 mg, Intramuscular, Once, Pen Injector Intramuscular .  As directed for anaphylaxis PRN    EScitalopram oxalate (LEXAPRO) 20 MG tablet TAKE 1 TABLET BY MOUTH EVERY DAY    famotidine (PEPCID) 40 MG tablet TAKE 1 TABLET BY MOUTH EVERY DAY    fluticasone propionate (FLOVENT HFA) 220 mcg/actuation inhaler 1 puff, Inhalation, 2 times daily, Controller    insulin (LANTUS SOLOSTAR U-100 INSULIN) 40 Units, Subcutaneous, Nightly    insulin aspart U-100 (NOVOLOG FLEXPEN U-100 INSULIN) 14 Units, Subcutaneous, 3 times daily with meals, Plus correction scale, max TDD 45 units    lancets Misc To check BG 4 times daily, to use with insurance preferred meter<BR>Please provide a meter and supplies that their insurance will cover. Thank You.  E11.65    losartan-hydrochlorothiazide 50-12.5 mg (HYZAAR) 50-12.5 mg per tablet TAKE 1 TABLET BY MOUTH EVERY DAY     "metFORMIN (GLUCOPHAGE-XR) 1,000 mg, Oral, 2 times daily with meals    metoprolol succinate (TOPROL-XL) 100 mg, Oral, Daily    pen needle, diabetic (BD ULTRA-FINE SHORT PEN NEEDLE) 31 gauge x 5/16" Ndle USE 4X DAILY WITH INSULIN    semaglutide (OZEMPIC) 1 mg, Subcutaneous, Every 7 days    vitamin D (VITAMIN D3) 2,000 Units, Oral, Daily    XARELTO 20 mg, Oral, With dinner       Lipid Panel:   Lab Results   Component Value Date    CHOL 249 (H) 10/24/2022    HDL 35 (L) 10/24/2022    LDLCALC 167.2 (H) 10/24/2022    TRIG 234 (H) 10/24/2022    CHOLHDL 14.1 (L) 10/24/2022       The 10-year ASCVD risk score (Rufino THRASHER, et al., 2019) is: 35.7%    Values used to calculate the score:      Age: 67 years      Sex: Female      Is Non- : No      Diabetic: Yes      Tobacco smoker: No      Systolic Blood Pressure: 160 mmHg      Is BP treated: Yes      HDL Cholesterol: 35 mg/dL      Total Cholesterol: 249 mg/dL    All pertinent labs, imaging, and EKGs reviewed.  Patient's most recent EKG tracing was personally interpreted by this provider.    Most Recent Echocardiogram Results  Results for orders placed during the hospital encounter of 10/23/22    Echo    Interpretation Summary  · The left ventricle is normal in size with concentric remodeling and normal systolic function.  · The estimated ejection fraction is 60%.  · Grade III left ventricular diastolic dysfunction.  · Normal right ventricular size with normal right ventricular systolic function.  · Mild mitral regurgitation.  · The estimated PA systolic pressure is 39 mmHg.  · There is no evidence of intracardiac shunting (negative bubble study)        Most Recent EKG  Results for orders placed or performed during the hospital encounter of 10/23/22   EKG 12-lead    Collection Time: 10/23/22  8:27 PM    Narrative    Test Reason : R47.01,    Vent. Rate : 127 BPM     Atrial Rate : 094 BPM     P-R Int : 000 ms          QRS Dur : 066 ms      QT Int : 336 ms       " P-R-T Axes : 000 018 046 degrees     QTc Int : 488 ms    Atrial fibrillation with rapid ventricular response  Abnormal ECG    Confirmed by Jamar Meyers ([id number]) on 10/24/2022 4:16:44 PM    Referred By: DANITA   SELF           Confirmed By:Jamar Meyers       No valid procedures specified.   No results found for this or any previous visit.    No valid procedures specified.      Assessment:       1. Paroxysmal atrial fibrillation    2. Hypertension associated with diabetes    3. Mixed hyperlipidemia         Plan:     Symptoms OK today  BP/Pulse elevated today  Most recent echocardiogram reviewed personally     Continue aspirin 81 mg PO Daily  Continue atorvastatin 40 mg PO Daily   Continue losartan-hydrochlorothiazide 50-12.5 mg PO Daily  Continue metoprolol succinate 100 mg PO Daily  Continue Xarelto 20 mg PO Daily to be taken with the largest meal of the day   24 hour holter monitor to get average HR, will adjust medications as needed    Continue other cardiac medications  Mediterranean Diet/Cardiovascular Exercise Program    Patient queried and all questions were answered.    F/u in 6 months to reassess      Signed:    Lul Wright MD  12/20/2022 8:26 AM

## 2023-01-18 ENCOUNTER — PATIENT MESSAGE (OUTPATIENT)
Dept: ADMINISTRATIVE | Facility: HOSPITAL | Age: 68
End: 2023-01-18
Payer: OTHER GOVERNMENT

## 2023-01-30 PROBLEM — G45.9 TIA (TRANSIENT ISCHEMIC ATTACK): Status: RESOLVED | Noted: 2022-10-23 | Resolved: 2023-01-30

## 2023-02-01 ENCOUNTER — PATIENT OUTREACH (OUTPATIENT)
Dept: ADMINISTRATIVE | Facility: HOSPITAL | Age: 68
End: 2023-02-01
Payer: OTHER GOVERNMENT

## 2023-02-01 ENCOUNTER — PATIENT MESSAGE (OUTPATIENT)
Dept: ADMINISTRATIVE | Facility: HOSPITAL | Age: 68
End: 2023-02-01
Payer: OTHER GOVERNMENT

## 2023-02-01 NOTE — PROGRESS NOTES
Dear Maureen [facility] is committed to your overall health.  To help you get the most out of each of your visits, we will review your information to make sure you are up to date on all of your recommended tests and/or procedures.      MAYKEL Kirby MD  has found that your chart shows you may be due for :    Health Maintenance Due   Topic    Shingles Vaccine (1 of 2)    Colorectal Cancer Screening     Mammogram     Hemoglobin A1c        If you have had any of the above done at another facility, please bring the records or information with you so that your record at [facility] will be complete.  If you would like to schedule any of these test, please call [phone] or send a message via [email or web address].       If you are currently taking medication, please bring it with you to your appointment for review.        Thank you for letting us care for you,      MAYKEL Kirby MD and your [facility] Team

## 2023-02-01 NOTE — LETTER
February 11, 2023    Jeanine Chandler  [address] [facility] [id number]             [facility]  [address]  [place] [address]  Phone: [phone] Dear Maureen [facility] is committed to your overall health.  To help you get the most out of each of your visits, we will review your information to make sure you are up to date on all of your recommended tests and/or procedures.       MAYKEL Kirby MD  has found that your chart shows you may be due for :         Health Maintenance Due   Topic    Shingles Vaccine (1 of 2)    Colorectal Cancer Screening     Mammogram     Hemoglobin A1c          If you have had any of the above done at another facility, please bring the records or information with you so that your record at [facility] will be complete.  If you would like to schedule any of these test, please call [phone] or send a message via [email or web address].        If you are currently taking medication, please bring it with you to your appointment for review.           Thank you for letting us care for you,        MAYKEL Kirby MD and your [facility] Team

## 2023-02-20 ENCOUNTER — PATIENT OUTREACH (OUTPATIENT)
Dept: ADMINISTRATIVE | Facility: HOSPITAL | Age: 68
End: 2023-02-20
Payer: OTHER GOVERNMENT

## 2023-02-20 ENCOUNTER — PATIENT MESSAGE (OUTPATIENT)
Dept: HEMATOLOGY/ONCOLOGY | Facility: CLINIC | Age: 68
End: 2023-02-20
Payer: OTHER GOVERNMENT

## 2023-02-20 ENCOUNTER — TELEPHONE (OUTPATIENT)
Dept: HEMATOLOGY/ONCOLOGY | Facility: CLINIC | Age: 68
End: 2023-02-20
Payer: OTHER GOVERNMENT

## 2023-02-20 NOTE — PROGRESS NOTES
UNCONTROLLED A1C REPORT.  PATIENT HAS AN UPCOMING LAB APPOINTMENT.  CHART REVIEWED;  LABS ORDERED AND LINKED WHAT IS NEEDED    Hemoglobin A1C   Date Value Ref Range Status   11/07/2022 9.8 (H) 4.0 - 5.6 % Final     Comment:     ADA Screening Guidelines:  5.7-6.4%  Consistent with prediabetes  >or=6.5%  Consistent with diabetes    High levels of fetal hemoglobin interfere with the HbA1C  assay. Heterozygous hemoglobin variants (HbS, HgC, etc)do  not significantly interfere with this assay.   However, presence of multiple variants may affect accuracy.     10/24/2022 10.4 (H) 0.0 - 5.6 % Final     Comment:     Reference Interval:  5.0 - 5.6 Normal   5.7 - 6.4 High Risk   > 6.5 Diabetic      Hgb A1c results are standardized based on the (NGSP) National   Glycohemoglobin Standardization Program.      Hemoglobin A1C levels are related to mean serum/plasma glucose   during the preceding 2-3 months.        08/05/2022 12.4 (H) 4.0 - 5.6 % Final     Comment:     ADA Screening Guidelines:  5.7-6.4%  Consistent with prediabetes  >or=6.5%  Consistent with diabetes    High levels of fetal hemoglobin interfere with the HbA1C  assay. Heterozygous hemoglobin variants (HbS, HgC, etc)do  not significantly interfere with this assay.   However, presence of multiple variants may affect accuracy.

## 2023-02-23 ENCOUNTER — PES CALL (OUTPATIENT)
Dept: ADMINISTRATIVE | Facility: CLINIC | Age: 68
End: 2023-02-23
Payer: OTHER GOVERNMENT

## 2023-02-27 ENCOUNTER — LAB VISIT (OUTPATIENT)
Dept: LAB | Facility: HOSPITAL | Age: 68
End: 2023-02-27
Attending: NURSE PRACTITIONER
Payer: OTHER GOVERNMENT

## 2023-02-27 DIAGNOSIS — E11.319 TYPE 2 DIABETES MELLITUS WITH BOTH EYES AFFECTED BY RETINOPATHY WITHOUT MACULAR EDEMA, WITH LONG-TERM CURRENT USE OF INSULIN, UNSPECIFIED RETINOPATHY SEVERITY: ICD-10-CM

## 2023-02-27 DIAGNOSIS — Z79.4 TYPE 2 DIABETES MELLITUS WITH BOTH EYES AFFECTED BY RETINOPATHY WITHOUT MACULAR EDEMA, WITH LONG-TERM CURRENT USE OF INSULIN, UNSPECIFIED RETINOPATHY SEVERITY: ICD-10-CM

## 2023-02-27 LAB
ALBUMIN SERPL BCP-MCNC: 3.7 G/DL (ref 3.5–5.2)
ALP SERPL-CCNC: 78 U/L (ref 55–135)
ALT SERPL W/O P-5'-P-CCNC: 10 U/L (ref 10–44)
ANION GAP SERPL CALC-SCNC: 11 MMOL/L (ref 8–16)
AST SERPL-CCNC: 16 U/L (ref 10–40)
BILIRUB SERPL-MCNC: 0.5 MG/DL (ref 0.1–1)
BUN SERPL-MCNC: 16 MG/DL (ref 8–23)
CALCIUM SERPL-MCNC: 9.8 MG/DL (ref 8.7–10.5)
CHLORIDE SERPL-SCNC: 100 MMOL/L (ref 95–110)
CHOLEST SERPL-MCNC: 263 MG/DL (ref 120–199)
CHOLEST/HDLC SERPL: 5.8 {RATIO} (ref 2–5)
CO2 SERPL-SCNC: 25 MMOL/L (ref 23–29)
CREAT SERPL-MCNC: 1.1 MG/DL (ref 0.5–1.4)
EST. GFR  (NO RACE VARIABLE): 54.4 ML/MIN/1.73 M^2
ESTIMATED AVG GLUCOSE: 223 MG/DL (ref 68–131)
GLUCOSE SERPL-MCNC: 259 MG/DL (ref 70–110)
HBA1C MFR BLD: 9.4 % (ref 4–5.6)
HDLC SERPL-MCNC: 45 MG/DL (ref 40–75)
HDLC SERPL: 17.1 % (ref 20–50)
LDLC SERPL CALC-MCNC: 165.2 MG/DL (ref 63–159)
NONHDLC SERPL-MCNC: 218 MG/DL
POTASSIUM SERPL-SCNC: 3.8 MMOL/L (ref 3.5–5.1)
PROT SERPL-MCNC: 7.6 G/DL (ref 6–8.4)
SODIUM SERPL-SCNC: 136 MMOL/L (ref 136–145)
TRIGL SERPL-MCNC: 264 MG/DL (ref 30–150)

## 2023-02-27 PROCEDURE — 36415 COLL VENOUS BLD VENIPUNCTURE: CPT | Mod: PO | Performed by: NURSE PRACTITIONER

## 2023-02-27 PROCEDURE — 83036 HEMOGLOBIN GLYCOSYLATED A1C: CPT | Performed by: NURSE PRACTITIONER

## 2023-02-27 PROCEDURE — 80061 LIPID PANEL: CPT | Performed by: NURSE PRACTITIONER

## 2023-02-27 PROCEDURE — 80053 COMPREHEN METABOLIC PANEL: CPT | Performed by: NURSE PRACTITIONER

## 2023-03-09 ENCOUNTER — OFFICE VISIT (OUTPATIENT)
Dept: ENDOCRINOLOGY | Facility: CLINIC | Age: 68
End: 2023-03-09
Payer: MEDICARE

## 2023-03-09 VITALS
HEIGHT: 62 IN | HEART RATE: 94 BPM | DIASTOLIC BLOOD PRESSURE: 60 MMHG | WEIGHT: 172.38 LBS | SYSTOLIC BLOOD PRESSURE: 110 MMHG | BODY MASS INDEX: 31.72 KG/M2

## 2023-03-09 DIAGNOSIS — E11.319 TYPE 2 DIABETES MELLITUS WITH BOTH EYES AFFECTED BY RETINOPATHY WITHOUT MACULAR EDEMA, WITH LONG-TERM CURRENT USE OF INSULIN, UNSPECIFIED RETINOPATHY SEVERITY: Primary | ICD-10-CM

## 2023-03-09 DIAGNOSIS — Z79.4 TYPE 2 DIABETES MELLITUS WITH BOTH EYES AFFECTED BY RETINOPATHY WITHOUT MACULAR EDEMA, WITH LONG-TERM CURRENT USE OF INSULIN, UNSPECIFIED RETINOPATHY SEVERITY: Primary | ICD-10-CM

## 2023-03-09 DIAGNOSIS — I10 ESSENTIAL HYPERTENSION: ICD-10-CM

## 2023-03-09 DIAGNOSIS — E78.2 MIXED HYPERLIPIDEMIA: ICD-10-CM

## 2023-03-09 DIAGNOSIS — E11.65 UNCONTROLLED TYPE 2 DIABETES MELLITUS WITH HYPERGLYCEMIA: ICD-10-CM

## 2023-03-09 DIAGNOSIS — E11.649 HYPOGLYCEMIA UNAWARENESS ASSOCIATED WITH TYPE 2 DIABETES MELLITUS: ICD-10-CM

## 2023-03-09 PROCEDURE — 99214 PR OFFICE/OUTPT VISIT, EST, LEVL IV, 30-39 MIN: ICD-10-PCS | Mod: S$GLB,,, | Performed by: NURSE PRACTITIONER

## 2023-03-09 PROCEDURE — 95251 CONT GLUC MNTR ANALYSIS I&R: CPT | Mod: S$GLB,,, | Performed by: NURSE PRACTITIONER

## 2023-03-09 PROCEDURE — 99999 PR PBB SHADOW E&M-EST. PATIENT-LVL V: ICD-10-PCS | Mod: PBBFAC,,, | Performed by: NURSE PRACTITIONER

## 2023-03-09 PROCEDURE — 95251 PR GLUCOSE MONITOR, 72 HOUR, PHYS INTERP: ICD-10-PCS | Mod: S$GLB,,, | Performed by: NURSE PRACTITIONER

## 2023-03-09 PROCEDURE — 99999 PR PBB SHADOW E&M-EST. PATIENT-LVL V: CPT | Mod: PBBFAC,,, | Performed by: NURSE PRACTITIONER

## 2023-03-09 PROCEDURE — 99214 OFFICE O/P EST MOD 30 MIN: CPT | Mod: S$GLB,,, | Performed by: NURSE PRACTITIONER

## 2023-03-09 RX ORDER — METFORMIN HYDROCHLORIDE 500 MG/1
1000 TABLET, EXTENDED RELEASE ORAL 2 TIMES DAILY WITH MEALS
Qty: 360 TABLET | Refills: 3 | Status: ON HOLD | OUTPATIENT
Start: 2023-03-09 | End: 2023-07-03 | Stop reason: HOSPADM

## 2023-03-09 RX ORDER — ROSUVASTATIN CALCIUM 20 MG/1
20 TABLET, COATED ORAL DAILY
Qty: 90 TABLET | Refills: 3 | Status: ON HOLD | OUTPATIENT
Start: 2023-03-09 | End: 2023-07-03 | Stop reason: HOSPADM

## 2023-03-09 RX ORDER — SEMAGLUTIDE 2.68 MG/ML
2 INJECTION, SOLUTION SUBCUTANEOUS
Qty: 3 PEN | Refills: 3 | Status: ON HOLD | OUTPATIENT
Start: 2023-03-09 | End: 2023-07-03 | Stop reason: HOSPADM

## 2023-03-09 RX ORDER — INSULIN GLARGINE 100 [IU]/ML
44 INJECTION, SOLUTION SUBCUTANEOUS NIGHTLY
Qty: 45 ML | Refills: 3 | Status: SHIPPED | OUTPATIENT
Start: 2023-03-09 | End: 2023-04-14

## 2023-03-09 RX ORDER — INSULIN ASPART 100 [IU]/ML
15 INJECTION, SOLUTION INTRAVENOUS; SUBCUTANEOUS
Qty: 60 EACH | Refills: 3 | Status: ON HOLD | OUTPATIENT
Start: 2023-03-09 | End: 2023-07-03 | Stop reason: HOSPADM

## 2023-03-09 NOTE — PROGRESS NOTES
"CC: Ms. Jeanine Chandler arrives today for management of Type 2 DM and review of chronic medical conditions, as listed in the Visit Diagnosis section of this encounter.       HPI: Ms. Jeanine Chandler was diagnosed with Type 2 DM in her late 40s. She was diagnosed based on lab work. Initial treatment consisted of metformin and insulin was added a few years later. + FH of DM in mother and sister. Denies hospitalizations due to DM.   Following with Dr. Wright for atrial fib.     Patient was last seen by me in November.  At this time, Novolog dose was increased.     BG monitoring per Dexcom G6. Recently resumed.     Hypoglycemia: Rare  Symptoms: sweating  Treatment: juice     Missing Insulin/PO medication doses: No    Exercise: No    Dietary Habits: Eats 2 meals/day. May snack on Little Karina cake (1 of the 2 pack). Avoids sugary beverages.     Last DM education appointment: 4/9/2019    Regarding hyperlipidemia, she states that she is taking atorvastatin 40 mg daily. However, rx on med list was for a 30 day prescription with zero refills in August.       CURRENT DIABETIC MEDS: metformin XR 1000 mg BID, Ozempic 1 mg weekly, Lantus 40 units QHS, Novolog 15 units with meals + correction scale, target 150, ISF 50  Vial or pen: pen  Glucometer type: unsure    Previous DM treatments:  Januvia  Metformin - diarrhea    Last Eye Exam: 10/2022. +    Last Podiatry Exam: n/a    REVIEW OF SYSTEMS  Constitutional: no c/o weakness, weight loss. + weight gain  Cardiac: no palpitations or chest pain.  Respiratory: no dyspnea, cough  GI: no c/o abdominal pain or nausea. Denies h/o pancreatitis.   Skin: no lesions or rashes.   Neuro: no numbness, tingling, or parasthesias.  Endocrine: denies polyphagia, polyuria, polydipsia       Personally reviewed Past Medical, Surgical, Social History.    Vital Signs  /60   Pulse 94   Ht 5' 2" (1.575 m)   Wt 78.2 kg (172 lb 6.4 oz)   BMI 31.53 kg/m²      Personally reviewed the " below labs:    Hemoglobin A1C   Date Value Ref Range Status   02/27/2023 9.4 (H) 4.0 - 5.6 % Final     Comment:     ADA Screening Guidelines:  5.7-6.4%  Consistent with prediabetes  >or=6.5%  Consistent with diabetes    High levels of fetal hemoglobin interfere with the HbA1C  assay. Heterozygous hemoglobin variants (HbS, HgC, etc)do  not significantly interfere with this assay.   However, presence of multiple variants may affect accuracy.     11/07/2022 9.8 (H) 4.0 - 5.6 % Final     Comment:     ADA Screening Guidelines:  5.7-6.4%  Consistent with prediabetes  >or=6.5%  Consistent with diabetes    High levels of fetal hemoglobin interfere with the HbA1C  assay. Heterozygous hemoglobin variants (HbS, HgC, etc)do  not significantly interfere with this assay.   However, presence of multiple variants may affect accuracy.     10/24/2022 10.4 (H) 0.0 - 5.6 % Final     Comment:     Reference Interval:  5.0 - 5.6 Normal   5.7 - 6.4 High Risk   > 6.5 Diabetic      Hgb A1c results are standardized based on the (NGSP) National   Glycohemoglobin Standardization Program.      Hemoglobin A1C levels are related to mean serum/plasma glucose   during the preceding 2-3 months.            Chemistry        Component Value Date/Time     02/27/2023 1004    K 3.8 02/27/2023 1004     02/27/2023 1004    CO2 25 02/27/2023 1004    BUN 16 02/27/2023 1004    CREATININE 1.1 02/27/2023 1004     (H) 02/27/2023 1004        Component Value Date/Time    CALCIUM 9.8 02/27/2023 1004    ALKPHOS 78 02/27/2023 1004    AST 16 02/27/2023 1004    ALT 10 02/27/2023 1004    BILITOT 0.5 02/27/2023 1004    ESTGFRAFRICA 37 (L) 09/14/2020 1038    EGFRNONAA 32 (L) 09/14/2020 1038          Lab Results   Component Value Date    CHOL 263 (H) 02/27/2023    CHOL 249 (H) 10/24/2022    CHOL 204 (H) 04/25/2022     Lab Results   Component Value Date    HDL 45 02/27/2023    HDL 35 (L) 10/24/2022    HDL 42 04/25/2022     Lab Results   Component Value Date     LDLCALC 165.2 (H) 02/27/2023    LDLCALC 167.2 (H) 10/24/2022    LDLCALC 112.6 04/25/2022     Lab Results   Component Value Date    TRIG 264 (H) 02/27/2023    TRIG 234 (H) 10/24/2022    TRIG 247 (H) 04/25/2022     Lab Results   Component Value Date    CHOLHDL 17.1 (L) 02/27/2023    CHOLHDL 14.1 (L) 10/24/2022    CHOLHDL 20.6 04/25/2022       Lab Results   Component Value Date    MICALBCREAT 31.7 (H) 04/25/2022     Lab Results   Component Value Date    TSH 3.770 10/24/2022       CrCl cannot be calculated (Patient's most recent lab result is older than the maximum 7 days allowed.).    No results found for: [id number]      PHYSICAL EXAMINATION  Constitutional: Appears well, no distress  Neck: Supple, trachea midline.  Respiratory: CTA, even and unlabored.  Cardiovascular: + irregular rhythm. Regular rate, no murmurs.  GI: active bowel sounds, no hernia noted.  Skin: warm and dry; no visible wounds  Neuro: oriented to person, place, time  Feet: appropriate footwear.      DEXCOM DOWNLOAD: See media file for download. Glucoses are variable but most are elevated, including fasting and pre-meal. Rare hypoglycemia.  Average glucose: 231 mg/dL  Above 250 mg/dL: 37 %  181-250 mg/dL: 29 %   mg/dL: 33 %  54-69 mg/dL: <1 %  Below 54 mg/dL: <1 %        A1c target < 7%      Assessment/Plan  1. Type 2 diabetes mellitus with retinopathy of both eyes, with long-term current use of insulin, macular edema presence unspecified, unspecified retinopathy severity  -- Uncontrolled.  -- increase Lantus to 44 units nightly  -- increase Ozempic to 2 mg weekly   -- continue Novolog 14 units with meals + sliding scale. May use half dose for small meals/snack.  -- continue metformin XR  -- BG monitoring per Dexcom G6. Notify me if BG remains > 200 after meals.      -- Discussed diagnosis of DM, A1c goals, progression of disease, long term complications and tx options.    -- Reviewed hypoglycemia management: treat with 1/2 glass of juice,  1/2 can regular coke, or 4 glucose tablets. Monitor and repeat treatment every 15 minutes until BG is >70 Then have a snack, which includes a complex carbohydrate and protein.   Advised patient to check BG before activities, such as driving or exercise.    -- takes statin, ACE-I, Xarelto     2. Essential hypertension  -- controlled  -- continue Hyzaar   3. Hyperlipidemia, unspecified hyperlipidemia type  -- uncontrolled  -- unsure if she's taking atorvastatin   -- change to rosuvastatin 20 mg nightly  -- lipid panel with RTC   4. Hypoglycemia unawareness associated with Type 2 DM  -- continue Dexcom CGM       FOLLOW UP  Follow up in about 3 months (around 6/9/2023).   Patient instructed to bring BG logs to each follow up   Patient encouraged to call for any BG/medication issues, concerns, or questions.      Orders Placed This Encounter   Procedures    Hemoglobin A1C    Lipid Panel    Comprehensive Metabolic Panel

## 2023-03-09 NOTE — PATIENT INSTRUCTIONS
Increase Lantus to 40 units nightly.     Continue Novolog 15 units before meals + sliding scale.     Increase Ozempic to 2 mg weekly.     Continue metformin.    Notify me if blood sugars remain in 200s after meals

## 2023-03-14 ENCOUNTER — HOSPITAL ENCOUNTER (OUTPATIENT)
Dept: RADIOLOGY | Facility: HOSPITAL | Age: 68
Discharge: HOME | End: 2023-03-14
Attending: INTERNAL MEDICINE
Payer: MEDICARE

## 2023-03-14 DIAGNOSIS — Z12.31 ENCOUNTER FOR SCREENING MAMMOGRAM FOR HIGH-RISK PATIENT: ICD-10-CM

## 2023-03-14 PROCEDURE — 77067 MAMMO DIGITAL SCREENING BILAT WITH TOMO: ICD-10-PCS | Mod: 26,,, | Performed by: RADIOLOGY

## 2023-03-14 PROCEDURE — 77067 SCR MAMMO BI INCL CAD: CPT | Mod: 26,,, | Performed by: RADIOLOGY

## 2023-03-14 PROCEDURE — 77063 BREAST TOMOSYNTHESIS BI: CPT | Mod: 26,,, | Performed by: RADIOLOGY

## 2023-03-14 PROCEDURE — 77067 SCR MAMMO BI INCL CAD: CPT | Mod: TC,PO

## 2023-03-14 PROCEDURE — 77063 MAMMO DIGITAL SCREENING BILAT WITH TOMO: ICD-10-PCS | Mod: 26,,, | Performed by: RADIOLOGY

## 2023-03-15 ENCOUNTER — OFFICE VISIT (OUTPATIENT)
Dept: HEMATOLOGY/ONCOLOGY | Facility: CLINIC | Age: 68
End: 2023-03-15
Payer: MEDICARE

## 2023-03-15 VITALS
RESPIRATION RATE: 18 BRPM | TEMPERATURE: 98 F | HEART RATE: 137 BPM | WEIGHT: 173.06 LBS | DIASTOLIC BLOOD PRESSURE: 98 MMHG | HEIGHT: 61 IN | BODY MASS INDEX: 32.67 KG/M2 | SYSTOLIC BLOOD PRESSURE: 168 MMHG | OXYGEN SATURATION: 97 %

## 2023-03-15 DIAGNOSIS — Z12.31 ENCOUNTER FOR SCREENING MAMMOGRAM FOR HIGH-RISK PATIENT: ICD-10-CM

## 2023-03-15 DIAGNOSIS — Z86.000 HISTORY OF DUCTAL CARCINOMA IN SITU (DCIS) OF BREAST: Primary | ICD-10-CM

## 2023-03-15 DIAGNOSIS — R26.9 GAIT DISTURBANCE: ICD-10-CM

## 2023-03-15 PROCEDURE — 99999 PR PBB SHADOW E&M-EST. PATIENT-LVL V: CPT | Mod: PBBFAC,,, | Performed by: INTERNAL MEDICINE

## 2023-03-15 PROCEDURE — 99213 OFFICE O/P EST LOW 20 MIN: CPT | Mod: S$GLB,,, | Performed by: INTERNAL MEDICINE

## 2023-03-15 PROCEDURE — 99999 PR PBB SHADOW E&M-EST. PATIENT-LVL V: ICD-10-PCS | Mod: PBBFAC,,, | Performed by: INTERNAL MEDICINE

## 2023-03-15 PROCEDURE — 99213 PR OFFICE/OUTPT VISIT, EST, LEVL III, 20-29 MIN: ICD-10-PCS | Mod: S$GLB,,, | Performed by: INTERNAL MEDICINE

## 2023-03-15 NOTE — PROGRESS NOTES
Subjective:       Patient ID: Jeanine Chandler is a 67 y.o. female.    Chief Complaint: No chief complaint on file.    HPI Ms. Chandler is a 67-year-old white female who returns to clinic for followup of a history of ductal carcinoma in situ of the left breast.     She has no breast complaints.  She does have some gait instability -uses a walker at home.        History:   She had lumpectomy for ER+ DCIS of the left breast in 05/2005  She was treated on protocol NSABP  B-35 (Arimidex versus tamoxifen).    She completed that therapy in 09/2010.      Review of Systems   Constitutional:  Negative for activity change, appetite change and unexpected weight change.   Respiratory:  Negative for cough and shortness of breath.    Cardiovascular:  Negative for chest pain.   Gastrointestinal:  Negative for abdominal pain, nausea and vomiting.   Musculoskeletal:  Negative for back pain and neck pain.   Neurological:  Negative for headaches.   Psychiatric/Behavioral:  Negative for dysphoric mood. The patient is not nervous/anxious.      Objective:      Physical Exam  Constitutional:       Appearance: She is well-developed.   Cardiovascular:      Rate and Rhythm: Tachycardia present. Rhythm irregular.      Heart sounds: Normal heart sounds.   Pulmonary:      Effort: Pulmonary effort is normal.      Breath sounds: Normal breath sounds. No wheezing or rales.   Chest:   Breasts:     Right: No mass, nipple discharge or skin change.      Left: No mass, nipple discharge or skin change.       Abdominal:      Palpations: Abdomen is soft. There is no mass.      Tenderness: There is no abdominal tenderness.   Psychiatric:         Behavior: Behavior normal.         Thought Content: Thought content normal.       Assessment:     mammograms 3/14/23 -  negative  1. History of ductal carcinoma in situ (DCIS) of breast      2. A fib with RVR  Plan:       Return in one year for follow-up mammograms.   F/U with Cardiology.  PT for gait       Route  Chart for Scheduling    Med Onc Chart Routing      Follow up with physician 1 year. Me or MAHAMED   Follow up with MAHAMED    Infusion scheduling note    Injection scheduling note    Labs    Imaging Mammogram      Pharmacy appointment    Other referrals  Additional referrals needed

## 2023-04-14 ENCOUNTER — TELEPHONE (OUTPATIENT)
Dept: ENDOCRINOLOGY | Facility: CLINIC | Age: 68
End: 2023-04-14
Payer: OTHER GOVERNMENT

## 2023-04-14 DIAGNOSIS — E11.319 TYPE 2 DIABETES MELLITUS WITH BOTH EYES AFFECTED BY RETINOPATHY WITHOUT MACULAR EDEMA, WITH LONG-TERM CURRENT USE OF INSULIN, UNSPECIFIED RETINOPATHY SEVERITY: Primary | ICD-10-CM

## 2023-04-14 DIAGNOSIS — Z79.4 TYPE 2 DIABETES MELLITUS WITH BOTH EYES AFFECTED BY RETINOPATHY WITHOUT MACULAR EDEMA, WITH LONG-TERM CURRENT USE OF INSULIN, UNSPECIFIED RETINOPATHY SEVERITY: Primary | ICD-10-CM

## 2023-04-14 RX ORDER — INSULIN GLARGINE 100 [IU]/ML
44 INJECTION, SOLUTION SUBCUTANEOUS NIGHTLY
Qty: 45 ML | Refills: 3 | Status: ON HOLD | OUTPATIENT
Start: 2023-04-14 | End: 2023-07-03 | Stop reason: HOSPADM

## 2023-04-17 ENCOUNTER — TELEPHONE (OUTPATIENT)
Dept: ENDOCRINOLOGY | Facility: CLINIC | Age: 68
End: 2023-04-17
Payer: OTHER GOVERNMENT

## 2023-04-17 NOTE — TELEPHONE ENCOUNTER
----- Message from Nettie Nguyen sent at 4/17/2023  3:58 PM CDT -----  Regarding: advise  Contact:  - Mark  Type: Needs Medical Advice  Who Called:  - Mark  Symptoms (please be specific):    How long has patient had these symptoms:    Pharmacy name and phone #:    Best Call Back Number: [phone]    [facility]/pharmacy #[id number] - [place] - [address] AT [facility]  [address]  [place] [address]  Phone: [phone] Fax: [phone]      Additional Information: Insurance will not longer cover the insulin aspart U-100 (NOVOLOG FLEXPEN U-100 INSULIN) 100 unit/mL (3 mL) InPn pen. And needs it changed to: Basaglar. Patient is completely out of medication. Notify  once sent to [facility]. Thanks!

## 2023-04-17 NOTE — TELEPHONE ENCOUNTER
Attempted to contact pt. No answer. Phone went straight to VM box which was full. Trying to get clarification from pt. Novolog & Basaglar are 2 different medications. If pt is referring to long acting insulin see message from 04/14 where the long acting insulin lantus was changed to basaglar and sent to the pharmacy.

## 2023-04-18 ENCOUNTER — TELEPHONE (OUTPATIENT)
Dept: ENDOCRINOLOGY | Facility: CLINIC | Age: 68
End: 2023-04-18
Payer: OTHER GOVERNMENT

## 2023-06-13 ENCOUNTER — LAB VISIT (OUTPATIENT)
Dept: LAB | Facility: HOSPITAL | Age: 68
End: 2023-06-13
Attending: NURSE PRACTITIONER
Payer: OTHER GOVERNMENT

## 2023-06-13 DIAGNOSIS — E11.319 TYPE 2 DIABETES MELLITUS WITH BOTH EYES AFFECTED BY RETINOPATHY WITHOUT MACULAR EDEMA, WITH LONG-TERM CURRENT USE OF INSULIN, UNSPECIFIED RETINOPATHY SEVERITY: ICD-10-CM

## 2023-06-13 DIAGNOSIS — Z79.4 TYPE 2 DIABETES MELLITUS WITH BOTH EYES AFFECTED BY RETINOPATHY WITHOUT MACULAR EDEMA, WITH LONG-TERM CURRENT USE OF INSULIN, UNSPECIFIED RETINOPATHY SEVERITY: ICD-10-CM

## 2023-06-13 LAB
ALBUMIN SERPL BCP-MCNC: 3.4 G/DL (ref 3.5–5.2)
ALP SERPL-CCNC: 81 U/L (ref 55–135)
ALT SERPL W/O P-5'-P-CCNC: 9 U/L (ref 10–44)
ANION GAP SERPL CALC-SCNC: 14 MMOL/L (ref 8–16)
AST SERPL-CCNC: 11 U/L (ref 10–40)
BILIRUB SERPL-MCNC: 0.6 MG/DL (ref 0.1–1)
BUN SERPL-MCNC: 9 MG/DL (ref 8–23)
CALCIUM SERPL-MCNC: 9.2 MG/DL (ref 8.7–10.5)
CHLORIDE SERPL-SCNC: 97 MMOL/L (ref 95–110)
CHOLEST SERPL-MCNC: 262 MG/DL (ref 120–199)
CHOLEST/HDLC SERPL: 5.8 {RATIO} (ref 2–5)
CO2 SERPL-SCNC: 24 MMOL/L (ref 23–29)
CREAT SERPL-MCNC: 1.2 MG/DL (ref 0.5–1.4)
EST. GFR  (NO RACE VARIABLE): 48.7 ML/MIN/1.73 M^2
ESTIMATED AVG GLUCOSE: 249 MG/DL (ref 68–131)
GLUCOSE SERPL-MCNC: 445 MG/DL (ref 70–110)
HBA1C MFR BLD: 10.3 % (ref 4–5.6)
HDLC SERPL-MCNC: 45 MG/DL (ref 40–75)
HDLC SERPL: 17.2 % (ref 20–50)
LDLC SERPL CALC-MCNC: 180 MG/DL (ref 63–159)
NONHDLC SERPL-MCNC: 217 MG/DL
POTASSIUM SERPL-SCNC: 3.2 MMOL/L (ref 3.5–5.1)
PROT SERPL-MCNC: 7 G/DL (ref 6–8.4)
SODIUM SERPL-SCNC: 135 MMOL/L (ref 136–145)
TRIGL SERPL-MCNC: 185 MG/DL (ref 30–150)

## 2023-06-13 PROCEDURE — 83036 HEMOGLOBIN GLYCOSYLATED A1C: CPT | Performed by: NURSE PRACTITIONER

## 2023-06-13 PROCEDURE — 36415 COLL VENOUS BLD VENIPUNCTURE: CPT | Mod: PO | Performed by: NURSE PRACTITIONER

## 2023-06-13 PROCEDURE — 80053 COMPREHEN METABOLIC PANEL: CPT | Performed by: NURSE PRACTITIONER

## 2023-06-13 PROCEDURE — 80061 LIPID PANEL: CPT | Performed by: NURSE PRACTITIONER

## 2023-06-15 ENCOUNTER — OFFICE VISIT (OUTPATIENT)
Dept: ENDOCRINOLOGY | Facility: CLINIC | Age: 68
End: 2023-06-15
Payer: MEDICARE

## 2023-06-15 ENCOUNTER — TELEPHONE (OUTPATIENT)
Dept: ENDOCRINOLOGY | Facility: CLINIC | Age: 68
End: 2023-06-15

## 2023-06-15 VITALS
HEART RATE: 72 BPM | DIASTOLIC BLOOD PRESSURE: 88 MMHG | BODY MASS INDEX: 32.66 KG/M2 | HEIGHT: 61 IN | SYSTOLIC BLOOD PRESSURE: 132 MMHG | WEIGHT: 173 LBS

## 2023-06-15 DIAGNOSIS — E11.319 TYPE 2 DIABETES MELLITUS WITH BOTH EYES AFFECTED BY RETINOPATHY WITHOUT MACULAR EDEMA, WITH LONG-TERM CURRENT USE OF INSULIN, UNSPECIFIED RETINOPATHY SEVERITY: Primary | ICD-10-CM

## 2023-06-15 DIAGNOSIS — E11.649 HYPOGLYCEMIA UNAWARENESS ASSOCIATED WITH TYPE 2 DIABETES MELLITUS: ICD-10-CM

## 2023-06-15 DIAGNOSIS — I10 ESSENTIAL HYPERTENSION: ICD-10-CM

## 2023-06-15 DIAGNOSIS — Z79.4 TYPE 2 DIABETES MELLITUS WITH BOTH EYES AFFECTED BY RETINOPATHY WITHOUT MACULAR EDEMA, WITH LONG-TERM CURRENT USE OF INSULIN, UNSPECIFIED RETINOPATHY SEVERITY: Primary | ICD-10-CM

## 2023-06-15 DIAGNOSIS — E78.2 MIXED HYPERLIPIDEMIA: ICD-10-CM

## 2023-06-15 DIAGNOSIS — R41.89 COGNITIVE DECLINE: ICD-10-CM

## 2023-06-15 PROCEDURE — 99214 OFFICE O/P EST MOD 30 MIN: CPT | Mod: S$GLB,,, | Performed by: NURSE PRACTITIONER

## 2023-06-15 PROCEDURE — 99999 PR PBB SHADOW E&M-EST. PATIENT-LVL V: CPT | Mod: PBBFAC,,, | Performed by: NURSE PRACTITIONER

## 2023-06-15 PROCEDURE — 99214 PR OFFICE/OUTPT VISIT, EST, LEVL IV, 30-39 MIN: ICD-10-PCS | Mod: S$GLB,,, | Performed by: NURSE PRACTITIONER

## 2023-06-15 PROCEDURE — 99215 OFFICE O/P EST HI 40 MIN: CPT | Mod: PBBFAC,PN | Performed by: NURSE PRACTITIONER

## 2023-06-15 PROCEDURE — 99999 PR PBB SHADOW E&M-EST. PATIENT-LVL V: ICD-10-PCS | Mod: PBBFAC,,, | Performed by: NURSE PRACTITIONER

## 2023-06-15 NOTE — PROGRESS NOTES
CC: Ms. Jeanine Chandler arrives today for management of Type 2 DM and review of chronic medical conditions, as listed in the Visit Diagnosis section of this encounter.       HPI: Ms. Jeanine Chandler was diagnosed with Type 2 DM in her late 40s. She was diagnosed based on lab work. Initial treatment consisted of metformin and insulin was added a few years later. + FH of DM in mother and sister. Denies hospitalizations due to DM.   Following with Dr. Wright for atrial fib.     Patient was last seen by me in March. At that time, both Lantus and Ozempic doses were increased.     Patient continues with unexplained hyperglycemia, sometimes into the 400s. However, she is denying missing medication doses.     Patient's  confided in me before pt's appt started that she has been more combative and has stopped cooking, cleaning, etc. Unable to perform ADLs and resides on couch or bed through the day. Also has become incontinent.     He  also confirms that she is not taking her insulin, likely other medications as well.     Today, pt states that she is taking her insulin with meals but cannot recall what her dose is. Cannot recall dose of long acting insulin either. However, she told our nurse that she is not taking Basaglar.     BG monitoring per Dexcom G6.  Uses reader.     Hypoglycemia: denies  Symptoms: sweating  Treatment: juice     Missing Insulin/PO medication doses: No    Exercise: No    Dietary Habits: Eats 2 meals/day. Usually skips breakfast because she sleeps in. Possible snack. Avoids sugary beverages.     Last DM education appointment: 4/9/2019    Regarding hyperlipidemia, she states that she is taking rosuvastatin.      CURRENT DIABETIC MEDS: metformin XR 1000 mg BID, Ozempic 2 mg weekly, Basaglar 44 units QHS, Novolog 15 units with meals + correction scale, target 150, ISF 50 -- these are the prescribed doses, as pt cannot tell me what her doses are.  Vial or pen: pen  Glucometer type:  "unsure    Previous DM treatments:  Tonyuvia  Metformin - diarrhea    Last Eye Exam: 10/2022. +    Last Podiatry Exam: n/a    REVIEW OF SYSTEMS  Constitutional: no c/o weakness, weight loss. + fatigue  Cardiac: no palpitations or chest pain.  Respiratory: no dyspnea, cough  GI: no c/o abdominal pain or nausea. Denies h/o pancreatitis.   : denies urinary frequency, incontinence   Skin: no lesions or rashes.   Neuro: no numbness, tingling, or parasthesias.  Endocrine: denies polyphagia, polyuria. + polydipsia      Personally reviewed Past Medical, Surgical, Social History.    Vital Signs  /88   Pulse 72   Ht 5' 1" (1.549 m)   Wt 78.5 kg (173 lb)   BMI 32.69 kg/m²      Personally reviewed the below labs:    Hemoglobin A1C   Date Value Ref Range Status   06/13/2023 10.3 (H) 4.0 - 5.6 % Final     Comment:     ADA Screening Guidelines:  5.7-6.4%  Consistent with prediabetes  >or=6.5%  Consistent with diabetes    High levels of fetal hemoglobin interfere with the HbA1C  assay. Heterozygous hemoglobin variants (HbS, HgC, etc)do  not significantly interfere with this assay.   However, presence of multiple variants may affect accuracy.     02/27/2023 9.4 (H) 4.0 - 5.6 % Final     Comment:     ADA Screening Guidelines:  5.7-6.4%  Consistent with prediabetes  >or=6.5%  Consistent with diabetes    High levels of fetal hemoglobin interfere with the HbA1C  assay. Heterozygous hemoglobin variants (HbS, HgC, etc)do  not significantly interfere with this assay.   However, presence of multiple variants may affect accuracy.     11/07/2022 9.8 (H) 4.0 - 5.6 % Final     Comment:     ADA Screening Guidelines:  5.7-6.4%  Consistent with prediabetes  >or=6.5%  Consistent with diabetes    High levels of fetal hemoglobin interfere with the HbA1C  assay. Heterozygous hemoglobin variants (HbS, HgC, etc)do  not significantly interfere with this assay.   However, presence of multiple variants may affect accuracy.         Chemistry  "       Component Value Date/Time     (L) 06/13/2023 1231    K 3.2 (L) 06/13/2023 1231    CL 97 06/13/2023 1231    CO2 24 06/13/2023 1231    BUN 9 06/13/2023 1231    CREATININE 1.2 06/13/2023 1231     (H) 06/13/2023 1231        Component Value Date/Time    CALCIUM 9.2 06/13/2023 1231    ALKPHOS 81 06/13/2023 1231    AST 11 06/13/2023 1231    ALT 9 (L) 06/13/2023 1231    BILITOT 0.6 06/13/2023 1231    ESTGFRAFRICA 37 (L) 09/14/2020 1038    EGFRNONAA 32 (L) 09/14/2020 1038          Lab Results   Component Value Date    CHOL 262 (H) 06/13/2023    CHOL 263 (H) 02/27/2023    CHOL 249 (H) 10/24/2022     Lab Results   Component Value Date    HDL 45 06/13/2023    HDL 45 02/27/2023    HDL 35 (L) 10/24/2022     Lab Results   Component Value Date    LDLCALC 180.0 (H) 06/13/2023    LDLCALC 165.2 (H) 02/27/2023    LDLCALC 167.2 (H) 10/24/2022     Lab Results   Component Value Date    TRIG 185 (H) 06/13/2023    TRIG 264 (H) 02/27/2023    TRIG 234 (H) 10/24/2022     Lab Results   Component Value Date    CHOLHDL 17.2 (L) 06/13/2023    CHOLHDL 17.1 (L) 02/27/2023    CHOLHDL 14.1 (L) 10/24/2022       Lab Results   Component Value Date    MICALBCREAT 31.7 (H) 04/25/2022     Lab Results   Component Value Date    TSH 3.770 10/24/2022       CrCl cannot be calculated (Unknown ideal weight.).    No results found for: [id number]      PHYSICAL EXAMINATION  Constitutional: Appears well, no distress  Respiratory: CTA, even and unlabored.  Cardiovascular: + irregular rhythm. Regular rate, no murmurs.  GI: active bowel sounds, no hernia noted.  Skin: warm and dry; no visible wounds  Neuro: oriented to person, place, time  Feet: appropriate footwear.        DEXCOM DOWNLOAD: Unable to download. Didn't bring reader for download.           A1c target < 7%      Assessment/Plan  1. Type 2 diabetes mellitus with retinopathy of both eyes, with long-term current use of insulin, macular edema presence unspecified, unspecified retinopathy  severity  -- Worsening. Pt's  confirms medication non-compliance. It is of my opinion that patient needs professional assistance, as  cannot keep up with her needs and her diabetes control is worsening.   -- no change to insulin doses at this time, as this is a matter of non-compliance. Will consult Case Management.  -- continue metformin XR, Ozempic   -- BG monitoring per Dexcom G6.      -- Discussed diagnosis of DM, A1c goals, progression of disease, long term complications and tx options.    -- Reviewed hypoglycemia management: treat with 1/2 glass of juice, 1/2 can regular coke, or 4 glucose tablets. Monitor and repeat treatment every 15 minutes until BG is >70 Then have a snack, which includes a complex carbohydrate and protein.   Advised patient to check BG before activities, such as driving or exercise.    -- takes statin, ACE-I, Xarelto     2. Essential hypertension  -- controlled  -- continue current meds   3. Hyperlipidemia, unspecified hyperlipidemia type  -- uncontrolled  -- unsure if she's taking atorvastatin   -- change to rosuvastatin 20 mg nightly  -- lipid panel with RTC   4. Hypoglycemia unawareness associated with Type 2 DM  -- continue Dexcom CGM   5. Cognitive decline  -- negatively affecting health. Likely needs assisted living. Will notify PCP.   -- Case management referral placed  -- Neurology referral placed, Dr. Jessee Gallegos       FOLLOW UP  Follow up in about 3 months (around 9/15/2023).   Patient instructed to bring BG logs to each follow up   Patient encouraged to call for any BG/medication issues, concerns, or questions.      Orders Placed This Encounter   Procedures    Hemoglobin A1C    Microalbumin/Creatinine Ratio, Urine    Basic Metabolic Panel    Ambulatory referral/consult to Neurology    Ambulatory referral/consult to Outpatient Case Management

## 2023-06-15 NOTE — TELEPHONE ENCOUNTER
Patient seen today in clinic and has noted cognitive decline.     Patient's  confided in me before pt's appt started that she has been more combative and has stopped cooking, cleaning, etc. Unable to perform ADLs and resides on couch or bed through the day. Also has become incontinent.     He  also confirms that she is not taking her insulin, likely other medications as well.     I have already consulted case management but would like for her to be seen by Dr. Kirby. Please call to schedule

## 2023-06-16 NOTE — TELEPHONE ENCOUNTER
Called pt, no answer, could not leave a message due to voice mail being full; I was calling to reschedule pt's EAWV appt she missed.   Discharge Instructions After Your Colonoscopy    You have received Sedation/Anesthesia for your procedure.  Side effects from these medications can make you groggy and dull your reflexes today, so to keep you safe, we ask you to follow these guidelines:    General guidelines  - Do not drive a car or operate machines for the remainder of the day.  - Do not make important decisions for for the remainder of the day.  - Restart all normal activities the day after your test.  - Do not drink alcohol for for the remainder of the day.    Complications  Complications from these tests are rare, but may include:  - Perforation (tear in the wall of the bowel)  - Bleeding  - Infection  - Drug reactions such as nausea and vomiting    Symptoms you should watch for after your procedure:  - Severe pain  - Abdomen (belly) is distended (swollen)and hard  - Possible signs of infection at your IV site include fever, swelling, heat, drainage, or redness  - Rectal bleeding of more than 1/2 cup. If you have hemorrhoids that are prone to bleeding, or if polyps were removed, you may see a small amount of blood on toilet paper when you wipe, or a drop or two in the toilet.  - Fever over 101º F  - Nausea or vomiting that is new to you as a result of your procedure    Pain   - There should be little or no pain after your procedure.  - You may continue to pass gas for the next several hours.  - If you have pain that is not relieved by passing gas, or pain that is new to you as a result of the procedure, call your doctor at the number below.    If you develop any of these symptoms, please call your doctor at [phone] during office hours (Monday-Friday 8am-4pm). Call the hospital  at [phone] after 4pm, weekends, holidays and ask for the GI physician on call for [place].    Results:  1.  Ascending colon polyp 3 mm and transverse colon polyp 4 mm both status post cold biopsy removal.    2. Mild diverticulosis  left-greater-than-right colon.    3. Minimal internal hemorrhoids.       Post procedure recommendations:    1. Await biopsy results.    Polyps were removed, your doctor will notify you with the results within 7-10 business days, and any further recommendations.    Diet Instruction: Diet may resume as prescribed.    Instructions about Medications: Medication may resume as prescribed.

## 2023-06-19 ENCOUNTER — TELEPHONE (OUTPATIENT)
Dept: ENDOCRINOLOGY | Facility: CLINIC | Age: 68
End: 2023-06-19
Payer: OTHER GOVERNMENT

## 2023-06-19 NOTE — TELEPHONE ENCOUNTER
Are we able to set  up as proxy user on my [facility]? I don't see an involvement of care on pts end?

## 2023-06-24 ENCOUNTER — PATIENT MESSAGE (OUTPATIENT)
Dept: FAMILY MEDICINE | Facility: CLINIC | Age: 68
End: 2023-06-24
Payer: OTHER GOVERNMENT

## 2023-06-24 PROBLEM — R79.89 ELEVATED TROPONIN: Status: ACTIVE | Noted: 2023-06-24

## 2023-06-24 PROBLEM — E87.6 HYPOKALEMIA: Status: ACTIVE | Noted: 2023-06-24

## 2023-06-26 ENCOUNTER — TELEPHONE (OUTPATIENT)
Dept: FAMILY MEDICINE | Facility: CLINIC | Age: 68
End: 2023-06-26
Payer: OTHER GOVERNMENT

## 2023-06-26 PROBLEM — R53.81 DEBILITY: Status: ACTIVE | Noted: 2023-06-26

## 2023-06-26 NOTE — TELEPHONE ENCOUNTER
----- Message from Kandice Azar sent at 6/26/2023  9:02 AM CDT -----  Regarding: advice  Contact:   Type: Needs Medical Advice  Who Called:  Patient  // Mark   Symptoms (please be specific):    How long has patient had these symptoms:    Pharmacy name and phone #:    Best Call Back Number: [phone] (home)     Additional Information: Patient  stated that patient is in the hospital and may not be able to make it to her upcoming appointment. Patient  would like to speak with doctor. May need a referral after hospital care. Please contact to advise. Thanks!

## 2023-06-26 NOTE — TELEPHONE ENCOUNTER
----- Message from Donnie Walters sent at 6/26/2023  4:29 PM CDT -----  Regarding: advice  Contact: ADA BOONE [[id number]]  Type: Needs Medical Advice  Who Called:  Mark     Symptoms (please be specific):  na    How long has patient had these symptoms:  mike    Pharmacy name and phone #:  na    Best Call Back Number: [phone]    Additional Information:  has questions about NH. Please call to advise.

## 2023-07-02 ENCOUNTER — PATIENT MESSAGE (OUTPATIENT)
Dept: FAMILY MEDICINE | Facility: CLINIC | Age: 68
End: 2023-07-02
Payer: MEDICARE

## 2023-07-02 DIAGNOSIS — R41.3 MEMORY PROBLEM: Primary | ICD-10-CM

## 2023-07-03 ENCOUNTER — PATIENT MESSAGE (OUTPATIENT)
Dept: FAMILY MEDICINE | Facility: CLINIC | Age: 68
End: 2023-07-03
Payer: MEDICARE

## 2023-07-03 ENCOUNTER — TELEPHONE (OUTPATIENT)
Dept: FAMILY MEDICINE | Facility: CLINIC | Age: 68
End: 2023-07-03
Payer: MEDICARE

## 2023-07-03 NOTE — TELEPHONE ENCOUNTER
----- Message from Stacey Farooq sent at 7/3/2023  4:29 PM CDT -----  Contact: Mark   Type:  Patient Requesting A Return Call    Who Called:  Patients  Mark  Who Left Message for Patient:  N/A- asking to speak to someone ASAP  Does the patient know what this is regarding?:  needs assistance with wife, she has been discharged and now having issues   Best Call Back Number:  [phone]  Additional Information:  Please call back to discuss ASAP. Thank You.

## 2023-07-05 ENCOUNTER — TELEPHONE (OUTPATIENT)
Dept: FAMILY MEDICINE | Facility: CLINIC | Age: 68
End: 2023-07-05
Payer: MEDICARE

## 2023-07-05 ENCOUNTER — PATIENT MESSAGE (OUTPATIENT)
Dept: FAMILY MEDICINE | Facility: CLINIC | Age: 68
End: 2023-07-05
Payer: MEDICARE

## 2023-07-05 NOTE — TELEPHONE ENCOUNTER
----- Message from Stacey Farooq sent at 7/3/2023  4:29 PM CDT -----  Contact: Mark   Type:  Patient Requesting A Return Call    Who Called:  Patients  Mark  Who Left Message for Patient:  N/A- asking to speak to someone ASAP  Does the patient know what this is regarding?:  needs assistance with wife, she has been discharged and now having issues   Best Call Back Number:  [phone]  Additional Information:  Please call back to discuss ASAP. Thank You.

## 2023-07-09 PROBLEM — I48.91 ATRIAL FIBRILLATION WITH RVR: Status: RESOLVED | Noted: 2022-10-23 | Resolved: 2023-07-09

## 2023-07-09 NOTE — PROGRESS NOTES
"Subjective:    Patient ID:  Jeanine Chandler is a 68 y.o. female who presents for follow-up of Atrial Fibrillation      Problem List Items Addressed This Visit          Cardiac/Vascular    Elevated troponin - Primary    Hypertension associated with diabetes    Mixed hyperlipidemia    Paroxysmal atrial fibrillation       HPI    Patient was last seen on 12/20/2022 at which time she was doing okay from a cardiac standpoint.  24 hour Holter monitor was ordered which was never completed.  She was readmitted recently [facility] with further AFib with RVR.  This was rate controlled.  Patient was not interested in cardioversion.    On assessment today, the patient states that she feels OK.    No chest pain.  No shortness of breath.  No palpitations.  Working with PT at her facility         Objective:     Vitals:    07/10/23 1352   BP: (!) 159/82   BP Location: Right arm   Patient Position: Sitting   BP Method: Large (Automatic)   Pulse: 82   Height: 5' 1" (1.549 m)       BP Readings from Last 5 Encounters:   07/10/23 (!) 159/82   07/03/23 (!) 149/84   06/15/23 132/88   03/15/23 (!) 168/98   03/09/23 110/60        Physical Exam  Vitals and nursing note reviewed.   Constitutional:       General: She is not in acute distress.     Appearance: She is well-developed.   HENT:      Head: Normocephalic and atraumatic.   Neck:      Vascular: No JVD.   Cardiovascular:      Rate and Rhythm: Normal rate. Rhythm irregular.      Heart sounds: Normal heart sounds. No murmur heard.    No friction rub. No gallop.   Pulmonary:      Effort: Pulmonary effort is normal. No respiratory distress.      Breath sounds: Normal breath sounds. No wheezing or rales.   Abdominal:      General: Bowel sounds are normal.      Palpations: Abdomen is soft.      Tenderness: There is no abdominal tenderness. There is no guarding or rebound.   Musculoskeletal:         General: No tenderness.      Cervical back: Neck supple.   Skin:     General: " Skin is warm and dry.   Neurological:      Mental Status: She is alert and oriented to person, place, and time.   Psychiatric:         Behavior: Behavior normal.           Current Outpatient Medications   Medication Instructions    aspirin (ECOTRIN) 81 mg, Oral, Daily    atorvastatin (LIPITOR) 80 mg, Oral, Daily    blood sugar diagnostic Strp To check BG 4 times daily, to use with insurance preferred meter<BR>Please provide a meter and supplies that their insurance will cover. Thank You.  E11.65    blood-glucose meter kit To check BG 4 times daily, to use with insurance preferred meter<BR>Please provide a meter and supplies that their insurance will cover. Thank You.  E11.65    blood-glucose sensor (DEXCOM G6 SENSOR) Lindsay Use for continuous blood sugar monitoring. Change every 10 days. Dx code E11.65    blood-glucose transmitter (DEXCOM G6 TRANSMITTER) Lindsay For continuous blood glucose monitoring. Change device every 90 days. Dx code E11.65    clotrimazole-betamethasone 1-0.05% (LOTRISONE) cream Topical (Top), 2 times daily    DEXCOM G6  Misc USE FOR CONTINUOUS BLOOD SUGAR MONITORING    diltiaZEM (CARDIZEM CD) 180 mg, Oral, Nightly    EPINEPHrine (EPIPEN) 0.3 mg, Intramuscular, Once, Pen Injector Intramuscular .  As directed for anaphylaxis PRN    EScitalopram oxalate (LEXAPRO) 20 MG tablet TAKE 1 TABLET BY MOUTH EVERY DAY    ezetimibe (ZETIA) 10 mg, Oral, Nightly    famotidine (PEPCID) 40 MG tablet TAKE 1 TABLET BY MOUTH EVERY DAY    fluticasone propionate (FLOVENT HFA) 220 mcg/actuation inhaler 1 puff, Inhalation, 2 times daily, Controller    insulin aspart U-100 (NOVOLOG) 17 Units, Subcutaneous, 3 times daily    insulin detemir U-100 (Levemir) 30 Units, Subcutaneous, 2 times daily    lancets Misc To check BG 4 times daily, to use with insurance preferred meter<BR>Please provide a meter and supplies that their insurance will cover. Thank You.  E11.65    losartan (COZAAR) 50 mg, Oral, Daily    metoprolol  "succinate (TOPROL-XL) 100 mg, Oral, Daily    pen needle, diabetic (BD ULTRA-FINE SHORT PEN NEEDLE) 31 gauge x 5/16" Ndle USE 4X DAILY WITH INSULIN    spironolactone (ALDACTONE) 25 mg, Oral, Daily    vitamin D (VITAMIN D3) 2,000 Units, Oral, Daily    XARELTO 20 mg, Oral, With dinner       Lipid Panel:   Lab Results   Component Value Date    CHOL 262 (H) 06/13/2023    HDL 45 06/13/2023    LDLCALC 180.0 (H) 06/13/2023    TRIG 185 (H) 06/13/2023    CHOLHDL 17.2 (L) 06/13/2023       The ASCVD Risk score (Rufino DK, et al., 2019) failed to calculate for the following reasons:    The patient has a prior MI or stroke diagnosis    All pertinent labs, imaging, and EKGs reviewed.  Patient's most recent EKG tracing was personally interpreted by this provider.    Most Recent EKG Results  Results for orders placed or performed during the hospital encounter of 06/23/23   EKG 12-lead    Collection Time: 06/26/23  3:24 PM    Narrative    Test Reason : R11.0,    Vent. Rate : 075 BPM     Atrial Rate : 070 BPM     P-R Int : 000 ms          QRS Dur : 074 ms      QT Int : 420 ms       P-R-T Axes : 000 013 164 degrees     QTc Int : 469 ms    Atrial fibrillation  T wave abnormality, consider anterolateral ischemia  Abnormal ECG  When compared with ECG of 24-JUN-2023 08:33,  No significant change was found  Confirmed by GEOVANI PEREIRA MD ([id number]) on 6/30/2023 11:40:00 AM    Referred By: DANITA   SELF           Confirmed By:GEOVANI PEREIRA MD       Most Recent Echocardiogram Results  Results for orders placed during the hospital encounter of 06/23/23    Echo    Interpretation Summary  · The left ventricle is normal in size with concentric remodeling and normal systolic function.  · The estimated ejection fraction is 60%.  · Left ventricular diastolic dysfunction.  · Normal right ventricular size with normal right ventricular systolic function.  · Mild left atrial enlargement.  · IVC not well visualized.      Most Recent Nuclear Stress Test " Results  No results found for this or any previous visit.      Most Recent Cardiac PET Stress Test Results  No results found for this or any previous visit.      Most Recent Cardiovascular Angiogram results  No results found for this or any previous visit.      Other Most Recent Cardiology Results  Results for orders placed during the hospital encounter of 06/23/23    CARDIAC MONITORING STRIPS        Assessment:       1. Elevated troponin    2. Hypertension associated with diabetes    3. Mixed hyperlipidemia    4. Paroxysmal atrial fibrillation         Plan:     Symptoms OK today  BP borderline, pulse OK  Most recent echocardiogram reviewed personally   Rhythm irregular    24 hour Holter monitor to obtain average heart rate    Continue aspirin 81 mg PO Daily  Continue atorvastatin 80 mg PO Daily   Continue diltiazem  mg PO Daily   Continue losartan 50 mg PO Daily  Continue metoprolol succinate 100 mg PO Daily   Continue spironolactone 25 mg PO Daily   Continue Xarelto 20 mg PO Daily to be taken with the largest meal of the day    Continue other cardiac medications  Mediterranean Diet/Cardiovascular Exercise Program    Patient queried and all questions were answered.    F/u in 6-9 months to reassess      Signed:    Lul Wright MD  7/10/2023 4:09 PM

## 2023-07-10 ENCOUNTER — OFFICE VISIT (OUTPATIENT)
Dept: CARDIOLOGY | Facility: CLINIC | Age: 68
End: 2023-07-10
Payer: MEDICARE

## 2023-07-10 ENCOUNTER — PATIENT MESSAGE (OUTPATIENT)
Dept: CARDIOLOGY | Facility: CLINIC | Age: 68
End: 2023-07-10

## 2023-07-10 VITALS
HEART RATE: 82 BPM | SYSTOLIC BLOOD PRESSURE: 159 MMHG | HEIGHT: 61 IN | BODY MASS INDEX: 31.37 KG/M2 | DIASTOLIC BLOOD PRESSURE: 82 MMHG

## 2023-07-10 DIAGNOSIS — I48.0 PAROXYSMAL ATRIAL FIBRILLATION: ICD-10-CM

## 2023-07-10 DIAGNOSIS — I15.2 HYPERTENSION ASSOCIATED WITH DIABETES: ICD-10-CM

## 2023-07-10 DIAGNOSIS — E11.59 HYPERTENSION ASSOCIATED WITH DIABETES: ICD-10-CM

## 2023-07-10 DIAGNOSIS — R79.89 ELEVATED TROPONIN: Primary | ICD-10-CM

## 2023-07-10 DIAGNOSIS — E78.2 MIXED HYPERLIPIDEMIA: ICD-10-CM

## 2023-07-10 PROCEDURE — 99999 PR PBB SHADOW E&M-EST. PATIENT-LVL III: CPT | Mod: PBBFAC,,, | Performed by: INTERNAL MEDICINE

## 2023-07-10 PROCEDURE — 99213 OFFICE O/P EST LOW 20 MIN: CPT | Mod: PBBFAC,PO | Performed by: INTERNAL MEDICINE

## 2023-07-10 PROCEDURE — 99214 PR OFFICE/OUTPT VISIT, EST, LEVL IV, 30-39 MIN: ICD-10-PCS | Mod: S$PBB,,, | Performed by: INTERNAL MEDICINE

## 2023-07-10 PROCEDURE — 99214 OFFICE O/P EST MOD 30 MIN: CPT | Mod: S$PBB,,, | Performed by: INTERNAL MEDICINE

## 2023-07-10 PROCEDURE — 99999 PR PBB SHADOW E&M-EST. PATIENT-LVL III: ICD-10-PCS | Mod: PBBFAC,,, | Performed by: INTERNAL MEDICINE

## 2023-07-28 ENCOUNTER — TELEPHONE (OUTPATIENT)
Dept: NEUROLOGY | Facility: CLINIC | Age: 68
End: 2023-07-28
Payer: OTHER GOVERNMENT

## 2023-07-28 ENCOUNTER — PATIENT OUTREACH (OUTPATIENT)
Dept: ADMINISTRATIVE | Facility: OTHER | Age: 68
End: 2023-07-28
Payer: OTHER GOVERNMENT

## 2023-07-28 NOTE — PROGRESS NOTES
CHW - Initial Contact and Case Closure    Spoke with patient's spouse. Patient is currently in a rehab facility.  Spouse stated that they would call me should they have any needs and decline discussing SDOH's at this time.  Pt/Caregiver denied any additional needs at this time and agrees with episode closure at this time.  Provided caregiver with Community Health Worker's contact information and encouraged him to contact this Community Health Worker if additional needs arise.

## 2023-07-28 NOTE — TELEPHONE ENCOUNTER
Called and spoke with Mark, patient's spouse regarding a sooner appointment with Niesha Simon NP for patient. Offered Mark August 30 at 10 am with Niesha Simon NP. Mark accepted offer. Informed Mark that appointment will be held and scheduled once someone with access can schedule. Mark taylor/iglesia.

## 2023-07-28 NOTE — TELEPHONE ENCOUNTER
----- Message from Skyla Farrell sent at 7/28/2023 11:17 AM CDT -----  Regarding: appointment  Contact: Adelaida with [facility]  Type:  Sooner Appointment Request    Caller is requesting a sooner appointment.  Caller declined first available appointment listed below.  Caller will not accept being placed on the waitlist and is requesting a message be sent to doctor.    Name of Caller:  Adelaida with [facility]  When is the first available appointment?    Symptoms:    Best Call Back Number:  [phone]  Additional Information:  Adelaida states spouse would like to get patient in a sooner appointment then 09/08/23.  Please call Nunu to schedule.  Thanks!

## 2023-09-19 ENCOUNTER — PATIENT MESSAGE (OUTPATIENT)
Dept: ADMINISTRATIVE | Facility: HOSPITAL | Age: 68
End: 2023-09-19
Payer: MEDICARE

## 2023-09-19 ENCOUNTER — PATIENT OUTREACH (OUTPATIENT)
Dept: ADMINISTRATIVE | Facility: HOSPITAL | Age: 68
End: 2023-09-19
Payer: MEDICARE

## 2023-09-19 NOTE — PROGRESS NOTES
Uncontrolled BP REPORT  BP Readings from Last 3 Encounters:   07/10/23 (!) 159/82   07/03/23 (!) 149/84   06/15/23 132/88       Non-compliant report chart audits for HYPERTENSION MANAGEMENT     Outreach to patient in reference to hypertension management       BLOOD PRESSURE FOLLOW UP NEEDED WITH A CARE TEAM MEMBER    ACTIVE PORTAL MESSAGE OR LETTER SENT

## 2023-09-20 ENCOUNTER — TELEPHONE (OUTPATIENT)
Dept: OPHTHALMOLOGY | Facility: CLINIC | Age: 68
End: 2023-09-20
Payer: MEDICARE

## 2023-12-12 ENCOUNTER — PATIENT MESSAGE (OUTPATIENT)
Dept: ADMINISTRATIVE | Facility: HOSPITAL | Age: 68
End: 2023-12-12
Payer: MEDICARE

## 2023-12-14 DIAGNOSIS — E11.319 TYPE 2 DIABETES MELLITUS WITH BOTH EYES AFFECTED BY RETINOPATHY WITHOUT MACULAR EDEMA, WITH LONG-TERM CURRENT USE OF INSULIN, UNSPECIFIED RETINOPATHY SEVERITY: ICD-10-CM

## 2023-12-14 DIAGNOSIS — Z79.4 TYPE 2 DIABETES MELLITUS WITH BOTH EYES AFFECTED BY RETINOPATHY WITHOUT MACULAR EDEMA, WITH LONG-TERM CURRENT USE OF INSULIN, UNSPECIFIED RETINOPATHY SEVERITY: ICD-10-CM

## 2023-12-14 RX ORDER — PEN NEEDLE, DIABETIC 30 GX3/16"
NEEDLE, DISPOSABLE MISCELLANEOUS
Qty: 400 EACH | Refills: 3 | Status: SHIPPED | OUTPATIENT
Start: 2023-12-14

## 2024-04-04 ENCOUNTER — PATIENT MESSAGE (OUTPATIENT)
Dept: ADMINISTRATIVE | Facility: HOSPITAL | Age: 69
End: 2024-04-04
Payer: OTHER GOVERNMENT

## 2024-04-16 ENCOUNTER — TELEPHONE (OUTPATIENT)
Dept: HEMATOLOGY/ONCOLOGY | Facility: CLINIC | Age: 69
End: 2024-04-16
Payer: OTHER GOVERNMENT

## 2024-04-16 NOTE — TELEPHONE ENCOUNTER
I reached out to the following Pt. To schedule Appt. with Doctor Cam. Pt Spouse says that  has been put in a nursing home and will not be scheduling a f/u. I sent well wishes and stated I will inform Doctor Cam of 's condition. -Doctor Cam was made aware of Pt's current condition.

## 2024-05-23 ENCOUNTER — PATIENT MESSAGE (OUTPATIENT)
Dept: ADMINISTRATIVE | Facility: HOSPITAL | Age: 69
End: 2024-05-23
Payer: OTHER GOVERNMENT

## 2024-07-02 ENCOUNTER — PATIENT MESSAGE (OUTPATIENT)
Dept: ADMINISTRATIVE | Facility: HOSPITAL | Age: 69
End: 2024-07-02
Payer: OTHER GOVERNMENT

## 2024-07-09 ENCOUNTER — PATIENT MESSAGE (OUTPATIENT)
Dept: ADMINISTRATIVE | Facility: HOSPITAL | Age: 69
End: 2024-07-09
Payer: OTHER GOVERNMENT

## 2024-07-29 ENCOUNTER — PATIENT MESSAGE (OUTPATIENT)
Dept: ADMINISTRATIVE | Facility: HOSPITAL | Age: 69
End: 2024-07-29
Payer: OTHER GOVERNMENT

## 2024-09-10 ENCOUNTER — PATIENT MESSAGE (OUTPATIENT)
Dept: ADMINISTRATIVE | Facility: HOSPITAL | Age: 69
End: 2024-09-10
Payer: OTHER GOVERNMENT

## 2024-11-11 ENCOUNTER — PATIENT MESSAGE (OUTPATIENT)
Dept: FAMILY MEDICINE | Facility: CLINIC | Age: 69
End: 2024-11-11
Payer: OTHER GOVERNMENT

## 2025-02-28 NOTE — TELEPHONE ENCOUNTER
----- Message from Felipe Sanchez sent at 7/3/2023 12:27 PM CDT -----  Regarding: Hosp F/u  Contact: [facility]  Type:  Sooner Apoointment Request    Caller is requesting a sooner appointment.  Caller declined first available appointment listed below.  Caller will not accept being placed on the waitlist and is requesting a message be sent to doctor.    Name of Caller:[facility]   When is the first available appointment?7/19    Symptoms:Hosp F/U discharge 7/3    Would the patient rather a call back or a response via [facility]? Call back  Best Call Back Number:[phone]    Additional Information: Please advise -- Thank you        
Spoke to pt's son Mark.  Pt has not left hospital yet and will be going to a skilled nursing facility.  Pt's son states that no appt is needed at this time.   
Unable to assess

## 2025-08-19 ENCOUNTER — PATIENT MESSAGE (OUTPATIENT)
Dept: ADMINISTRATIVE | Facility: HOSPITAL | Age: 70
End: 2025-08-19
Payer: OTHER GOVERNMENT